# Patient Record
Sex: FEMALE | Race: WHITE | HISPANIC OR LATINO | Employment: FULL TIME | URBAN - METROPOLITAN AREA
[De-identification: names, ages, dates, MRNs, and addresses within clinical notes are randomized per-mention and may not be internally consistent; named-entity substitution may affect disease eponyms.]

---

## 2017-01-13 ENCOUNTER — TELEPHONE (OUTPATIENT)
Dept: DERMATOLOGY | Facility: CLINIC | Age: 52
End: 2017-01-13

## 2017-01-13 NOTE — TELEPHONE ENCOUNTER
----- Message from Terra Igor sent at 1/12/2017 12:05 PM CST -----  Contact: patient  Patient calling in regards to medication. She has 2 refills left for Oracea, but it costs over $600. She received a card from the Doctor last time with a percentage off. She wants to know if the office has anymore cards. Please advise.  Call back .  Thanks!

## 2017-03-07 DIAGNOSIS — L71.9 ROSACEA: ICD-10-CM

## 2017-03-07 RX ORDER — DOXYCYCLINE 40 MG/1
CAPSULE ORAL
Qty: 30 CAPSULE | Refills: 1 | Status: SHIPPED | OUTPATIENT
Start: 2017-03-07 | End: 2017-03-09 | Stop reason: SDUPTHER

## 2017-03-09 DIAGNOSIS — L71.9 ROSACEA: ICD-10-CM

## 2017-03-09 RX ORDER — DOXYCYCLINE 40 MG/1
CAPSULE ORAL
Qty: 30 CAPSULE | Refills: 5 | Status: SHIPPED | OUTPATIENT
Start: 2017-03-09 | End: 2017-08-26 | Stop reason: SDUPTHER

## 2017-08-26 DIAGNOSIS — L71.9 ROSACEA: ICD-10-CM

## 2017-08-28 RX ORDER — DOXYCYCLINE 40 MG/1
CAPSULE ORAL
Qty: 30 CAPSULE | Refills: 0 | Status: SHIPPED | OUTPATIENT
Start: 2017-08-28 | End: 2017-09-24 | Stop reason: SDUPTHER

## 2017-09-24 DIAGNOSIS — L71.9 ROSACEA: ICD-10-CM

## 2017-09-25 RX ORDER — DOXYCYCLINE 40 MG/1
CAPSULE ORAL
Qty: 30 CAPSULE | Refills: 0 | Status: SHIPPED | OUTPATIENT
Start: 2017-09-25 | End: 2017-11-06 | Stop reason: SDUPTHER

## 2017-11-06 DIAGNOSIS — L71.9 ROSACEA: ICD-10-CM

## 2017-11-07 RX ORDER — DOXYCYCLINE 40 MG/1
CAPSULE ORAL
Qty: 30 CAPSULE | Refills: 5 | Status: SHIPPED | OUTPATIENT
Start: 2017-11-07 | End: 2018-04-20 | Stop reason: SDUPTHER

## 2017-12-11 ENCOUNTER — ANESTHESIA EVENT (OUTPATIENT)
Dept: SURGERY | Facility: HOSPITAL | Age: 52
End: 2017-12-11
Payer: COMMERCIAL

## 2017-12-11 ENCOUNTER — HOSPITAL ENCOUNTER (OUTPATIENT)
Dept: RADIOLOGY | Facility: HOSPITAL | Age: 52
Discharge: HOME | End: 2017-12-11
Attending: ORTHOPAEDIC SURGERY
Payer: COMMERCIAL

## 2017-12-11 ENCOUNTER — HOSPITAL ENCOUNTER (OUTPATIENT)
Dept: PREADMISSION TESTING | Facility: HOSPITAL | Age: 52
Discharge: HOME | End: 2017-12-11
Attending: ORTHOPAEDIC SURGERY
Payer: COMMERCIAL

## 2017-12-11 VITALS — HEIGHT: 62 IN | WEIGHT: 160 LBS | BODY MASS INDEX: 29.44 KG/M2

## 2017-12-11 DIAGNOSIS — M23.41 LOOSE BODY IN KNEE, RIGHT KNEE: Primary | ICD-10-CM

## 2017-12-11 DIAGNOSIS — Z01.818 PRE-OP EXAM: ICD-10-CM

## 2017-12-11 LAB
ANION GAP SERPL CALC-SCNC: 10 MMOL/L
BASOPHILS # BLD AUTO: 0 K/UL
BASOPHILS NFR BLD: 0.2 %
BUN SERPL-MCNC: 10 MG/DL
CALCIUM SERPL-MCNC: 9.4 MG/DL
CHLORIDE SERPL-SCNC: 105 MMOL/L
CO2 SERPL-SCNC: 26 MMOL/L
CREAT SERPL-MCNC: 0.7 MG/DL
DIFFERENTIAL METHOD: ABNORMAL
EOSINOPHIL # BLD AUTO: 0.2 K/UL
EOSINOPHIL NFR BLD: 2.1 %
ERYTHROCYTE [DISTWIDTH] IN BLOOD BY AUTOMATED COUNT: 12.4 %
EST. GFR  (AFRICAN AMERICAN): >60 ML/MIN/1.73 M^2
EST. GFR  (NON AFRICAN AMERICAN): >60 ML/MIN/1.73 M^2
GLUCOSE SERPL-MCNC: 91 MG/DL
HCT VFR BLD AUTO: 36.8 %
HGB BLD-MCNC: 12.6 G/DL
LYMPHOCYTES # BLD AUTO: 2.7 K/UL
LYMPHOCYTES NFR BLD: 33.4 %
MCH RBC QN AUTO: 29.5 PG
MCHC RBC AUTO-ENTMCNC: 34.2 G/DL
MCV RBC AUTO: 86 FL
MONOCYTES # BLD AUTO: 0.7 K/UL
MONOCYTES NFR BLD: 8.6 %
NEUTROPHILS # BLD AUTO: 4.5 K/UL
NEUTROPHILS NFR BLD: 55.7 %
PLATELET # BLD AUTO: 320 K/UL
PMV BLD AUTO: 8.5 FL
POTASSIUM SERPL-SCNC: 3.8 MMOL/L
RBC # BLD AUTO: 4.26 M/UL
SODIUM SERPL-SCNC: 141 MMOL/L
WBC # BLD AUTO: 8.1 K/UL

## 2017-12-11 PROCEDURE — 93005 ELECTROCARDIOGRAM TRACING: CPT

## 2017-12-11 PROCEDURE — 99900104 DSU ONLY-NO CHARGE-EA ADD'L HR (STAT)

## 2017-12-11 PROCEDURE — 85025 COMPLETE CBC W/AUTO DIFF WBC: CPT

## 2017-12-11 PROCEDURE — 36415 COLL VENOUS BLD VENIPUNCTURE: CPT

## 2017-12-11 PROCEDURE — 99900103 DSU ONLY-NO CHARGE-INITIAL HR (STAT)

## 2017-12-11 PROCEDURE — 93010 ELECTROCARDIOGRAM REPORT: CPT | Mod: ,,, | Performed by: INTERNAL MEDICINE

## 2017-12-11 PROCEDURE — 71020 XR CHEST PA AND LATERAL: CPT | Mod: TC

## 2017-12-11 PROCEDURE — 80048 BASIC METABOLIC PNL TOTAL CA: CPT

## 2017-12-11 PROCEDURE — 71020 XR CHEST PA AND LATERAL: CPT | Mod: 26,,, | Performed by: RADIOLOGY

## 2017-12-11 RX ORDER — CEFAZOLIN SODIUM 2 G/50ML
2 SOLUTION INTRAVENOUS ONCE
Status: CANCELLED | OUTPATIENT
Start: 2017-12-11 | End: 2017-12-11

## 2017-12-12 ENCOUNTER — HOSPITAL ENCOUNTER (OUTPATIENT)
Facility: HOSPITAL | Age: 52
Discharge: HOME | End: 2017-12-12
Attending: ORTHOPAEDIC SURGERY | Admitting: ORTHOPAEDIC SURGERY
Payer: COMMERCIAL

## 2017-12-12 ENCOUNTER — ANESTHESIA (OUTPATIENT)
Dept: SURGERY | Facility: HOSPITAL | Age: 52
End: 2017-12-12
Payer: COMMERCIAL

## 2017-12-12 DIAGNOSIS — S83.232A COMPLEX TEAR OF MEDIAL MENISCUS OF LEFT KNEE: ICD-10-CM

## 2017-12-12 DIAGNOSIS — M23.41 BODIES, LOOSE, KNEE, RIGHT: ICD-10-CM

## 2017-12-12 PROCEDURE — 25000003 PHARM REV CODE 250: Performed by: ANESTHESIOLOGY

## 2017-12-12 PROCEDURE — 63600175 PHARM REV CODE 636 W HCPCS: Performed by: ANESTHESIOLOGY

## 2017-12-12 PROCEDURE — 99900103 DSU ONLY-NO CHARGE-INITIAL HR (STAT): Performed by: ORTHOPAEDIC SURGERY

## 2017-12-12 PROCEDURE — D9220A PRA ANESTHESIA: Mod: CRNA,,, | Performed by: NURSE ANESTHETIST, CERTIFIED REGISTERED

## 2017-12-12 PROCEDURE — 36000710: Performed by: ORTHOPAEDIC SURGERY

## 2017-12-12 PROCEDURE — 71000039 HC RECOVERY, EACH ADD'L HOUR: Performed by: ORTHOPAEDIC SURGERY

## 2017-12-12 PROCEDURE — 37000008 HC ANESTHESIA 1ST 15 MINUTES: Performed by: ORTHOPAEDIC SURGERY

## 2017-12-12 PROCEDURE — 71000033 HC RECOVERY, INTIAL HOUR: Performed by: ORTHOPAEDIC SURGERY

## 2017-12-12 PROCEDURE — 27200651 HC AIRWAY, LMA: Performed by: NURSE ANESTHETIST, CERTIFIED REGISTERED

## 2017-12-12 PROCEDURE — 99900104 DSU ONLY-NO CHARGE-EA ADD'L HR (STAT): Performed by: ORTHOPAEDIC SURGERY

## 2017-12-12 PROCEDURE — 36000711: Performed by: ORTHOPAEDIC SURGERY

## 2017-12-12 PROCEDURE — 37000009 HC ANESTHESIA EA ADD 15 MINS: Performed by: ORTHOPAEDIC SURGERY

## 2017-12-12 PROCEDURE — 71000015 HC POSTOP RECOV 1ST HR: Performed by: ORTHOPAEDIC SURGERY

## 2017-12-12 PROCEDURE — 63600175 PHARM REV CODE 636 W HCPCS: Performed by: NURSE ANESTHETIST, CERTIFIED REGISTERED

## 2017-12-12 PROCEDURE — D9220A PRA ANESTHESIA: Mod: ANES,,, | Performed by: ANESTHESIOLOGY

## 2017-12-12 PROCEDURE — 25000003 PHARM REV CODE 250: Performed by: ORTHOPAEDIC SURGERY

## 2017-12-12 PROCEDURE — S0077 INJECTION, CLINDAMYCIN PHOSP: HCPCS | Performed by: ORTHOPAEDIC SURGERY

## 2017-12-12 RX ORDER — ONDANSETRON 4 MG/1
8 TABLET, ORALLY DISINTEGRATING ORAL EVERY 8 HOURS PRN
Status: CANCELLED | OUTPATIENT
Start: 2017-12-12

## 2017-12-12 RX ORDER — SODIUM CHLORIDE 0.9 % (FLUSH) 0.9 %
5 SYRINGE (ML) INJECTION
Status: DISCONTINUED | OUTPATIENT
Start: 2017-12-12 | End: 2017-12-12 | Stop reason: HOSPADM

## 2017-12-12 RX ORDER — HYDROMORPHONE HYDROCHLORIDE 1 MG/ML
0.5 INJECTION, SOLUTION INTRAMUSCULAR; INTRAVENOUS; SUBCUTANEOUS
Status: CANCELLED | OUTPATIENT
Start: 2017-12-12

## 2017-12-12 RX ORDER — ONDANSETRON 2 MG/ML
INJECTION INTRAMUSCULAR; INTRAVENOUS
Status: DISCONTINUED | OUTPATIENT
Start: 2017-12-12 | End: 2017-12-12

## 2017-12-12 RX ORDER — DEXAMETHASONE SODIUM PHOSPHATE 4 MG/ML
INJECTION, SOLUTION INTRA-ARTICULAR; INTRALESIONAL; INTRAMUSCULAR; INTRAVENOUS; SOFT TISSUE
Status: DISCONTINUED | OUTPATIENT
Start: 2017-12-12 | End: 2017-12-12

## 2017-12-12 RX ORDER — PROPOFOL 10 MG/ML
VIAL (ML) INTRAVENOUS
Status: DISCONTINUED | OUTPATIENT
Start: 2017-12-12 | End: 2017-12-12

## 2017-12-12 RX ORDER — ONDANSETRON 4 MG/1
8 TABLET, ORALLY DISINTEGRATING ORAL EVERY 6 HOURS PRN
Qty: 30 TABLET | Refills: 1 | Status: SHIPPED | OUTPATIENT
Start: 2017-12-12 | End: 2018-06-07

## 2017-12-12 RX ORDER — FENTANYL CITRATE 50 UG/ML
INJECTION, SOLUTION INTRAMUSCULAR; INTRAVENOUS
Status: DISCONTINUED | OUTPATIENT
Start: 2017-12-12 | End: 2017-12-12

## 2017-12-12 RX ORDER — LIDOCAINE HYDROCHLORIDE 10 MG/ML
1 INJECTION, SOLUTION EPIDURAL; INFILTRATION; INTRACAUDAL; PERINEURAL ONCE
Status: COMPLETED | OUTPATIENT
Start: 2017-12-12 | End: 2017-12-12

## 2017-12-12 RX ORDER — FENTANYL CITRATE 50 UG/ML
25 INJECTION, SOLUTION INTRAMUSCULAR; INTRAVENOUS EVERY 5 MIN PRN
Status: DISCONTINUED | OUTPATIENT
Start: 2017-12-12 | End: 2017-12-12 | Stop reason: HOSPADM

## 2017-12-12 RX ORDER — OXYCODONE HYDROCHLORIDE 5 MG/1
5 TABLET ORAL ONCE AS NEEDED
Status: COMPLETED | OUTPATIENT
Start: 2017-12-12 | End: 2017-12-12

## 2017-12-12 RX ORDER — ACETAMINOPHEN 10 MG/ML
INJECTION, SOLUTION INTRAVENOUS
Status: DISCONTINUED | OUTPATIENT
Start: 2017-12-12 | End: 2017-12-12

## 2017-12-12 RX ORDER — MIDAZOLAM HYDROCHLORIDE 1 MG/ML
INJECTION, SOLUTION INTRAMUSCULAR; INTRAVENOUS
Status: DISCONTINUED | OUTPATIENT
Start: 2017-12-12 | End: 2017-12-12

## 2017-12-12 RX ORDER — LIDOCAINE HCL/PF 100 MG/5ML
SYRINGE (ML) INTRAVENOUS
Status: DISCONTINUED | OUTPATIENT
Start: 2017-12-12 | End: 2017-12-12

## 2017-12-12 RX ORDER — MUPIROCIN 20 MG/G
1 OINTMENT TOPICAL 2 TIMES DAILY
Status: CANCELLED | OUTPATIENT
Start: 2017-12-12 | End: 2017-12-17

## 2017-12-12 RX ORDER — CETIRIZINE HYDROCHLORIDE 10 MG/1
10 TABLET ORAL DAILY
COMMUNITY

## 2017-12-12 RX ORDER — AMOXICILLIN 500 MG
2 CAPSULE ORAL DAILY
Status: ON HOLD | COMMUNITY
End: 2017-12-12 | Stop reason: CLARIF

## 2017-12-12 RX ORDER — KETOROLAC TROMETHAMINE 30 MG/ML
INJECTION, SOLUTION INTRAMUSCULAR; INTRAVENOUS
Status: DISCONTINUED | OUTPATIENT
Start: 2017-12-12 | End: 2017-12-12

## 2017-12-12 RX ORDER — ONDANSETRON 2 MG/ML
4 INJECTION INTRAMUSCULAR; INTRAVENOUS ONCE AS NEEDED
Status: DISCONTINUED | OUTPATIENT
Start: 2017-12-12 | End: 2017-12-12 | Stop reason: HOSPADM

## 2017-12-12 RX ORDER — SODIUM CHLORIDE, SODIUM LACTATE, POTASSIUM CHLORIDE, CALCIUM CHLORIDE 600; 310; 30; 20 MG/100ML; MG/100ML; MG/100ML; MG/100ML
INJECTION, SOLUTION INTRAVENOUS CONTINUOUS
Status: DISCONTINUED | OUTPATIENT
Start: 2017-12-12 | End: 2017-12-12

## 2017-12-12 RX ORDER — CLINDAMYCIN PHOSPHATE 900 MG/50ML
900 INJECTION, SOLUTION INTRAVENOUS ONCE
Status: COMPLETED | OUTPATIENT
Start: 2017-12-12 | End: 2017-12-12

## 2017-12-12 RX ADMIN — ACETAMINOPHEN 1000 MG: 10 INJECTION, SOLUTION INTRAVENOUS at 08:12

## 2017-12-12 RX ADMIN — CLINDAMYCIN PHOSPHATE 900 MG: 18 INJECTION, SOLUTION INTRAVENOUS at 08:12

## 2017-12-12 RX ADMIN — OXYCODONE HYDROCHLORIDE 5 MG: 5 TABLET ORAL at 09:12

## 2017-12-12 RX ADMIN — PROPOFOL 150 MG: 10 INJECTION, EMULSION INTRAVENOUS at 08:12

## 2017-12-12 RX ADMIN — FENTANYL CITRATE 25 MCG: 50 INJECTION INTRAMUSCULAR; INTRAVENOUS at 09:12

## 2017-12-12 RX ADMIN — FENTANYL CITRATE 25 MCG: 50 INJECTION, SOLUTION INTRAMUSCULAR; INTRAVENOUS at 08:12

## 2017-12-12 RX ADMIN — FENTANYL CITRATE 50 MCG: 50 INJECTION, SOLUTION INTRAMUSCULAR; INTRAVENOUS at 08:12

## 2017-12-12 RX ADMIN — ONDANSETRON 4 MG: 2 INJECTION, SOLUTION INTRAMUSCULAR; INTRAVENOUS at 08:12

## 2017-12-12 RX ADMIN — DEXAMETHASONE SODIUM PHOSPHATE 8 MG: 4 INJECTION, SOLUTION INTRAMUSCULAR; INTRAVENOUS at 08:12

## 2017-12-12 RX ADMIN — LIDOCAINE HYDROCHLORIDE 10 MG: 10 INJECTION, SOLUTION EPIDURAL; INFILTRATION; INTRACAUDAL; PERINEURAL at 06:12

## 2017-12-12 RX ADMIN — SODIUM CHLORIDE, SODIUM GLUCONATE, SODIUM ACETATE, POTASSIUM CHLORIDE, MAGNESIUM CHLORIDE, SODIUM PHOSPHATE, DIBASIC, AND POTASSIUM PHOSPHATE: .53; .5; .37; .037; .03; .012; .00082 INJECTION, SOLUTION INTRAVENOUS at 07:12

## 2017-12-12 RX ADMIN — LIDOCAINE HYDROCHLORIDE 75 MG: 20 INJECTION, SOLUTION INTRAVENOUS at 08:12

## 2017-12-12 RX ADMIN — MIDAZOLAM 2 MG: 1 INJECTION INTRAMUSCULAR; INTRAVENOUS at 07:12

## 2017-12-12 RX ADMIN — KETOROLAC TROMETHAMINE 30 MG: 30 INJECTION, SOLUTION INTRAMUSCULAR; INTRAVENOUS at 08:12

## 2017-12-12 NOTE — BRIEF OP NOTE
"[facility]  Brief Operative Note     SUMMARY     Surgery Date: 12/12/2017     Surgeon(s) and Role:     * Dontrell Rodriguez Jr., MD - Primary    Assisting Surgeon: None    Pre-op Diagnosis:  Loose body in knee, right [M23.41]    Post-op Diagnosis:  Post-Op Diagnosis Codes:     * Loose body in knee, right [M23.41]    Procedure(s) (LRB):  ARTHROSCOPY-KNEE (Right)    Anesthesia: General    Description of the findings of the procedure  cplasty mfc and patella    Estimated Blood Loss: 5 mL         Specimens:   Specimen (12h ago through future)    None          Discharge Note    SUMMARY     Admit Date: 12/12/2017    Discharge Date and Time:  12/12/2017 9:00 AM    Hospital Course (synopsis of major diagnoses, care, treatment, and services provided during the course of the hospital stay): Uneventful Outpatient Surgery     Final Diagnosis: Post-Op Diagnosis Codes:     * Loose body in knee, right [M23.41]    Disposition: Home or Self Care    Follow Up/Patient Instructions:     Medications:  Reconciled Home Medications: Current Discharge Medication List      START taking these medications    Details   ondansetron (ZOFRAN-ODT) 4 MG TbDL Take 2 tablets (8 mg total) by mouth every 6 (six) hours as needed.  Qty: 30 tablet, Refills: 1         CONTINUE these medications which have NOT CHANGED    Details   amlodipine-benazepril 5-20 mg (LOTREL) 5-20 mg per capsule       cetirizine (ZYRTEC) 10 MG tablet Take 10 mg by mouth once daily.      cyclobenzaprine (FLEXERIL) 10 MG tablet       doxycycline (ORACEA) 40 mg capsule TAKE 1 CAPSULE BY MOUTH ONCE DAILY  Qty: 30 capsule, Refills: 5    Associated Diagnoses: Rosacea      metronidazole 1% (METROGEL) 1 % Gel       PREMARIN 0.625 mg tablet       simvastatin (ZOCOR) 20 MG tablet       zolpidem (AMBIEN) 10 mg Tab              Discharge Procedure Orders  WALKER FOR HOME USE   Order Specific Question Answer Comments   Type of Walker: Pedro Pablo (4'4"-5'7")    With wheels? No " "standard   Height: 5' 1.5" (1.562 m)    Weight: 72.6 kg (160 lb)    Length of need (1-99 months): 99    Please check all that apply: Patient's condition impairs ambulation.    Please check all that apply: Patient is unable to safely ambulate without equipment.    Please check all that apply: Patient needs help to get in and out of chair.      WALKER FOR HOME USE   Order Specific Question Answer Comments   Type of Walker: Adult (5'4"-6'6")    With wheels? Yes    Height: 5' 1.5" (1.562 m)    Weight: 72.6 kg (160 lb)    Length of need (1-99 months): 2    Please check all that apply: Patient's condition impairs ambulation.      Diet general     Activity as tolerated     Shower on day dressing removed (No bath)     Ice to affected area     Weight bearing restrictions (specify)     No driving, operating heavy equipment or signing legal documents while taking pain medication     Call MD for:  temperature >100.4     Call MD for:  persistent nausea and vomiting     Call MD for:  severe uncontrolled pain     Call MD for:  difficulty breathing, headache or visual disturbances     Call MD for:  redness, tenderness, or signs of infection (pain, swelling, redness, odor or green/yellow discharge around incision site)     Call MD for:  hives     Call MD for:  persistent dizziness or light-headedness     Call MD for:  extreme fatigue     Remove dressing in 72 hours       Follow-up Information     Dontrell Rodriguez Jr, MD In 10 days.    Specialty:  Orthopedic Surgery  Contact information:  [address] [address]  [phone]                 "

## 2017-12-12 NOTE — ANESTHESIA POSTPROCEDURE EVALUATION
"Anesthesia Post Evaluation    Patient: Karlene Damon    Procedure(s) Performed: Procedure(s) (LRB):  ARTHROSCOPY-KNEE (Right)    Final Anesthesia Type: general  Patient location during evaluation: PACU  Patient participation: Yes- Able to Participate  Level of consciousness: awake and alert  Post-procedure vital signs: reviewed and stable  Pain management: adequate  Airway patency: patent  PONV status at discharge: No PONV  Anesthetic complications: no      Cardiovascular status: blood pressure returned to baseline and hemodynamically stable  Respiratory status: unassisted  Hydration status: euvolemic  Follow-up not needed.        Visit Vitals  BP (!) 111/56   Pulse 94   Temp 36.5 °C (97.7 °F) (Temporal)   Resp 13   Ht 5' 1.5" (1.562 m)   Wt 72.6 kg (160 lb)   SpO2 99%   Breastfeeding? No   BMI 29.74 kg/m²       Pain/Shayna Score: Pain Assessment Performed: Yes (12/12/2017  9:30 AM)  Presence of Pain: complains of pain/discomfort (12/12/2017  9:30 AM)  Pain Rating Prior to Med Admin: 7 (12/12/2017  9:35 AM)  Shayna Score: 8 (12/12/2017  9:30 AM)      "

## 2017-12-12 NOTE — PLAN OF CARE
Pt states that pain is tolerable and that she is ready for discharge.  Walker delivered and use explained by rep

## 2017-12-12 NOTE — OP NOTE
DATE OF PROCEDURE:  12/12/2017    PROCEDURE:  Arthroscopy of right knee with chondroplasty of medial femoral   condyle and patella.    PREOPERATIVE DIAGNOSES:  Chondral defect of patella with loose body.    POSTOPERATIVE DIAGNOSES:  Chondral defect of medial femoral condyle and patella,   no loose body.    SURGEON:  Dontrell Rodriguez M.D.    ASSISTANT:  Stephen Lilly.    ANESTHESIA:  General.    PROCEDURE IN DETAIL:  The patient was brought to the Operating Room and placed   under general anesthesia.  The right knee was then prepped and draped in a   satisfactory manner, exsanguinated the Esmarch, elevated the tourniquet to 300.    Introduced the Veress needle through supralateral puncture, then made an   anteromedial and anterolateral portal.  We then looked at the anterior   compartment, cleaned out some of the fat pad to get a good look.  We could then   see the ACL was in good condition, the PCL was in good condition and no obvious   loose bodies in the anterior compartment.  On the medial side of the knee, the   medial meniscus was in good condition, the medial tibial plateau was in good   condition.  Medial femoral condyle had 2 chondral defects, both of which were   just anterior to the weightbearing surface, not in the weightbearing surface   itself, weightbearing surface really looked good.  No real cobblestoning or   wear.  The defects anterior to that were then debrided with the shaver.  One of   those defects was about 1 cm in size and was quite a bit loose material.  Once   that was cleaned up, we were down to bone and later on we elected to do a little   chondroplasty with a chondral pick, put a couple of holes in the subchondral   bone and we could see some pink blood supply underneath that, tried to get that   to fill in, but this was not a big area, but less than 1 cm down to bone, but   all the way down to bone, well delineated, well demarcated defect.  The lateral   side of the knee was in good  condition.  The surfaces were nice and smooth.  The   lateral meniscus in good condition, no loose body.  Then, looked at the   patellofemoral joint from above and below, better seen from above.  She had   significant chondromalacia of patella, superior half of the patella required   chondroplasty.  As we went more inferior, we could see she required   chondroplasty of that as well and there was a small area down to bone at the   inferior pole of the patella, but the patella with the shaver and a thermal   device smoothed out significantly and did seem to ride adequately down the   groove.  I do not think she needed any realignment maneuvers.  We then looked   around the knee suprapatellar, swept the gutters twice, did not see any loose   bodies and we cleaned up some of the synovium to see if there are any loose   bodies trapped in the suprapatellar area, but I could not find a loose body   anywhere in the suprapatellar area or along the lateral gutter or the medial   gutter.  Went back anteriorly and looked again anterior compartment and the   medial and lateral corners and could not find loose body.  So, if there is a   loose body, it is well contained and out of the way and not loose.  We did find   good reason for catching in the knee with these 2 chondral defects of the   patella and the medial femoral condyle.  We then withdrew our instruments, used   some Marcaine with epinephrine to the skin and closed the skin with nylon,   applied a bulky dressing.  She was then reversed from anesthesia, taken to   Recovery Room in satisfactory condition, tolerated the procedure well.      ELMER/IN  dd: 12/12/2017 09:05:41 (CST)  td: 12/12/2017 09:52:06 ([facility])  Doc ID   #[id number]  Job ID #[id number]    CC:

## 2017-12-12 NOTE — PROGRESS NOTES
Walker delivered to bedside. All paperwork signed by  and returned to supply closet. Okay for walker delivery by Arya with [facility].

## 2017-12-12 NOTE — ANESTHESIA PREPROCEDURE EVALUATION
12/12/2017  Karlene Damon is a 52 y.o., female.    Anesthesia Evaluation    I have reviewed the Patient Summary Reports.    I have reviewed the Nursing Notes.      Review of Systems  Anesthesia Hx:  No problems with previous Anesthesia    Cardiovascular:   Hypertension, well controlled        Physical Exam  General:  Well nourished                 Anesthesia Plan  Type of Anesthesia, risks & benefits discussed:  Anesthesia Type:  general  Patient's Preference:   Intra-op Monitoring Plan:   Intra-op Monitoring Plan Comments:   Post Op Pain Control Plan:   Post Op Pain Control Plan Comments:   Induction:   IV  Beta Blocker:  Patient is not currently on a Beta-Blocker (No further documentation required).       Informed Consent: Patient understands risks and agrees with Anesthesia plan.  Questions answered. Anesthesia consent signed with patient.  ASA Score: 2     Day of Surgery Review of History & Physical:    H&P update referred to the surgeon.         Ready For Surgery From Anesthesia Perspective.

## 2017-12-12 NOTE — DISCHARGE INSTRUCTIONS
KEep ice to knee for two days.  Remove dressing in two days and shower, no tub baths or hot tub.    Keep knee elevated         General Information:    1.  Do not drink alcoholic beverages including beer for 24 hours or as long as you are on pain medication..  2.  Do not drive a motor vehicle, operate machinery or power tools, or signs legal papers for 24 hours or as long as you are on pain medication.   3.  You may experience light-headedness, dizziness, and sleepiness following surgery. Please do not stay alone. A responsible adult should be with you for this 24 hour period.  4.  Go home and rest.    5. Progress slowly to a normal diet unless instructed.  Otherwise, begin with liquids such as soft drinks, then soup and crackers working up to solid foods. Drink plenty of nonalcoholic fluids.  6.  Certain anesthetics and pain medications produce nausea and vomiting in certain       individuals. If nausea becomes a problem at home, call you doctor.    7. A nurse will be calling you sometime after surgery. Do not be alarmed. This is our way of finding out how you are doing.    8. Several times every hour while you are awake, take 2-3 deep breaths and cough. If you had stomach surgery hold a pillow or rolled towel firmly against your stomach before you cough. This will help with any pain the cough might cause.  9. Several times every hour while you are awake, pump and flex your feet 5-6 times and do foot circles. This will help prevent blood clots.    10.Call your doctor for severe pain, bleeding, fever, or signs or symptoms of infection (pain, swelling, redness, foul odor, drainage).      General Information:    1.  Do not drink alcoholic beverages including beer for 24 hours or as long as you are on pain medication..  2.  Do not drive a motor vehicle, operate machinery or power tools, or signs legal papers for 24 hours or as long as you are on pain medication.   3.  You may experience light-headedness, dizziness, and  sleepiness following surgery. Please do not stay alone. A responsible adult should be with you for this 24 hour period.  4.  Go home and rest.    5. Progress slowly to a normal diet unless instructed.  Otherwise, begin with liquids such as soft drinks, then soup and crackers working up to solid foods. Drink plenty of nonalcoholic fluids.  6.  Certain anesthetics and pain medications produce nausea and vomiting in certain       individuals. If nausea becomes a problem at home, call you doctor.    7. A nurse will be calling you sometime after surgery. Do not be alarmed. This is our way of finding out how you are doing.    8. Several times every hour while you are awake, take 2-3 deep breaths and cough. If you had stomach surgery hold a pillow or rolled towel firmly against your stomach before you cough. This will help with any pain the cough might cause.  9. Several times every hour while you are awake, pump and flex your feet 5-6 times and do foot circles. This will help prevent blood clots.    10.Call your doctor for severe pain, bleeding, fever, or signs or symptoms of infection (pain, swelling, redness, foul odor, drainage).      Using Opioids for Pain Management     Your doctor has given instructions for you to take an opioid.  This is a drug for bad pain.  It helps control pain without causing bleeding and kidney problems.  Common opioid names are morphine, hydromorphone, oxycodone, and methadone. These drugs are called narcotics.    There are several safety concerns you need to know.     · It is against the law to give or sell this drug to another person.  You must keep this medicine safely locked.    · You may have side effects from taking this medication.  These include nausea, itching, sweating, sleepiness, a change in your ability to breathe, and depression.  · Do not take alcohol or sleeping pills opioids.    · Long-term opoid use may no longer giver you relief from pain.  It can cause you stomach pain,  mental anxiety, and headaches.  Long-term opoid use can potentially lead to unlawful street drug abuse and reduce your ability to stay employed.    · Your body may become opioid tolerant if you need to take more to get relief.    · You must stop taking opioids if you begin having more pain as a result of the medicine.    · Opioid withdrawal occurs when you have to stop taking the drug.  It can cause you to have nausea, vomiting, diarrhea, stomach pain, anxiety, and dilated pupils in your eyes. This condition means you are opioid dependent.    · Addiction is a drug induced brain disease. It means there are changes in how your brain is working.  Children, teens, and young adults under 25 years old are more likely to get addicted to opioids.      · Addiction can happen with repeated opioid use.  It does not happen with short-term use of two weeks or less.       For more information, please speak with your doctor or pharmacist.      We hope your stay was comfortable as you heal now, mend and rest.    For we have enjoyed taking care of you by giving your our best.    And as you get better, by regaining your health and strength;   We count it as a privilege to have served you and hope your time at [facility] was well spent.      Thank  You!!!

## 2017-12-12 NOTE — PLAN OF CARE
Pt calm,  at her side, discussed and explained Text message,  Both verbalized understanding.

## 2017-12-13 VITALS
HEIGHT: 62 IN | BODY MASS INDEX: 29.44 KG/M2 | SYSTOLIC BLOOD PRESSURE: 110 MMHG | TEMPERATURE: 98 F | HEART RATE: 80 BPM | WEIGHT: 160 LBS | DIASTOLIC BLOOD PRESSURE: 70 MMHG | OXYGEN SATURATION: 97 % | RESPIRATION RATE: 18 BRPM

## 2018-04-20 DIAGNOSIS — L71.9 ROSACEA: ICD-10-CM

## 2018-04-20 RX ORDER — DOXYCYCLINE 40 MG/1
CAPSULE ORAL
Qty: 30 CAPSULE | Refills: 5 | Status: SHIPPED | OUTPATIENT
Start: 2018-04-20 | End: 2018-10-12 | Stop reason: SDUPTHER

## 2018-05-30 ENCOUNTER — DOCUMENTATION ONLY (OUTPATIENT)
Dept: FAMILY MEDICINE | Facility: CLINIC | Age: 53
End: 2018-05-30

## 2018-05-30 NOTE — PROGRESS NOTES
Pre-Visit Chart Review  For Appointment Scheduled on 6/7/18.    Health Maintenance Due   Topic Date Due    Hepatitis C Screening  1965    Lipid Panel  1965    TETANUS VACCINE  05/11/1982    Mammogram  05/11/2004    Colonoscopy  05/11/2014

## 2018-06-07 ENCOUNTER — LAB VISIT (OUTPATIENT)
Dept: LAB | Facility: HOSPITAL | Age: 53
End: 2018-06-07
Attending: FAMILY MEDICINE
Payer: COMMERCIAL

## 2018-06-07 ENCOUNTER — PATIENT MESSAGE (OUTPATIENT)
Dept: FAMILY MEDICINE | Facility: CLINIC | Age: 53
End: 2018-06-07

## 2018-06-07 ENCOUNTER — OFFICE VISIT (OUTPATIENT)
Dept: FAMILY MEDICINE | Facility: CLINIC | Age: 53
End: 2018-06-07
Payer: COMMERCIAL

## 2018-06-07 VITALS
WEIGHT: 167.56 LBS | BODY MASS INDEX: 30.83 KG/M2 | SYSTOLIC BLOOD PRESSURE: 118 MMHG | HEIGHT: 62 IN | TEMPERATURE: 99 F | DIASTOLIC BLOOD PRESSURE: 86 MMHG | HEART RATE: 103 BPM

## 2018-06-07 DIAGNOSIS — Z00.00 ROUTINE MEDICAL EXAM: Primary | ICD-10-CM

## 2018-06-07 DIAGNOSIS — I10 HYPERTENSION, UNSPECIFIED TYPE: ICD-10-CM

## 2018-06-07 DIAGNOSIS — E66.9 OBESITY, CLASS I, BMI 30-34.9: ICD-10-CM

## 2018-06-07 DIAGNOSIS — Z11.59 NEED FOR HEPATITIS C SCREENING TEST: ICD-10-CM

## 2018-06-07 DIAGNOSIS — R10.9 RIGHT FLANK PAIN: ICD-10-CM

## 2018-06-07 DIAGNOSIS — Z00.00 ROUTINE MEDICAL EXAM: ICD-10-CM

## 2018-06-07 DIAGNOSIS — Z12.11 SCREENING FOR COLON CANCER: ICD-10-CM

## 2018-06-07 PROBLEM — G47.00 INSOMNIA: Status: ACTIVE | Noted: 2018-06-07

## 2018-06-07 PROBLEM — M62.830 BACK SPASM: Status: ACTIVE | Noted: 2018-06-07

## 2018-06-07 PROBLEM — L71.9 ROSACEA: Status: ACTIVE | Noted: 2018-06-07

## 2018-06-07 PROBLEM — E66.811 OBESITY, CLASS I, BMI 30-34.9: Status: ACTIVE | Noted: 2018-06-07

## 2018-06-07 PROBLEM — E78.2 HYPERLIPIDEMIA, MIXED: Status: ACTIVE | Noted: 2018-06-07

## 2018-06-07 PROBLEM — Z79.890 HORMONE REPLACEMENT THERAPY (HRT): Status: ACTIVE | Noted: 2018-06-07

## 2018-06-07 LAB
ALBUMIN SERPL BCP-MCNC: 3.8 G/DL
ALP SERPL-CCNC: 65 U/L
ALT SERPL W/O P-5'-P-CCNC: 20 U/L
ANION GAP SERPL CALC-SCNC: 9 MMOL/L
AST SERPL-CCNC: 19 U/L
BASOPHILS # BLD AUTO: 0.05 K/UL
BASOPHILS NFR BLD: 0.7 %
BILIRUB SERPL-MCNC: 0.3 MG/DL
BUN SERPL-MCNC: 9 MG/DL
CALCIUM SERPL-MCNC: 9.7 MG/DL
CHLORIDE SERPL-SCNC: 106 MMOL/L
CHOLEST SERPL-MCNC: 148 MG/DL
CHOLEST/HDLC SERPL: 3.1 {RATIO}
CO2 SERPL-SCNC: 24 MMOL/L
CREAT SERPL-MCNC: 0.8 MG/DL
DIFFERENTIAL METHOD: ABNORMAL
EOSINOPHIL # BLD AUTO: 0.1 K/UL
EOSINOPHIL NFR BLD: 1.9 %
ERYTHROCYTE [DISTWIDTH] IN BLOOD BY AUTOMATED COUNT: 12.7 %
EST. GFR  (AFRICAN AMERICAN): >60 ML/MIN/1.73 M^2
EST. GFR  (NON AFRICAN AMERICAN): >60 ML/MIN/1.73 M^2
GLUCOSE SERPL-MCNC: 99 MG/DL
HCT VFR BLD AUTO: 39.2 %
HDLC SERPL-MCNC: 47 MG/DL
HDLC SERPL: 31.8 %
HGB BLD-MCNC: 12.6 G/DL
IMM GRANULOCYTES # BLD AUTO: 0.05 K/UL
IMM GRANULOCYTES NFR BLD AUTO: 0.7 %
LDLC SERPL CALC-MCNC: 65.4 MG/DL
LYMPHOCYTES # BLD AUTO: 2.3 K/UL
LYMPHOCYTES NFR BLD: 31.2 %
MCH RBC QN AUTO: 29.4 PG
MCHC RBC AUTO-ENTMCNC: 32.1 G/DL
MCV RBC AUTO: 91 FL
MONOCYTES # BLD AUTO: 0.7 K/UL
MONOCYTES NFR BLD: 9.4 %
NEUTROPHILS # BLD AUTO: 4.1 K/UL
NEUTROPHILS NFR BLD: 56.1 %
NONHDLC SERPL-MCNC: 101 MG/DL
NRBC BLD-RTO: 0 /100 WBC
PLATELET # BLD AUTO: 348 K/UL
PMV BLD AUTO: 10.4 FL
POTASSIUM SERPL-SCNC: 4.5 MMOL/L
PROT SERPL-MCNC: 7.3 G/DL
RBC # BLD AUTO: 4.29 M/UL
SODIUM SERPL-SCNC: 139 MMOL/L
TRIGL SERPL-MCNC: 178 MG/DL
TSH SERPL DL<=0.005 MIU/L-ACNC: 1.57 UIU/ML
WBC # BLD AUTO: 7.37 K/UL

## 2018-06-07 PROCEDURE — 99386 PREV VISIT NEW AGE 40-64: CPT | Mod: 25,S$GLB,, | Performed by: FAMILY MEDICINE

## 2018-06-07 PROCEDURE — 99999 PR PBB SHADOW E&M-EST. PATIENT-LVL IV: CPT | Mod: PBBFAC,,, | Performed by: FAMILY MEDICINE

## 2018-06-07 PROCEDURE — 86803 HEPATITIS C AB TEST: CPT

## 2018-06-07 PROCEDURE — 85025 COMPLETE CBC W/AUTO DIFF WBC: CPT

## 2018-06-07 PROCEDURE — 80061 LIPID PANEL: CPT

## 2018-06-07 PROCEDURE — 80053 COMPREHEN METABOLIC PANEL: CPT

## 2018-06-07 PROCEDURE — 87086 URINE CULTURE/COLONY COUNT: CPT

## 2018-06-07 PROCEDURE — 84443 ASSAY THYROID STIM HORMONE: CPT

## 2018-06-07 PROCEDURE — 3074F SYST BP LT 130 MM HG: CPT | Mod: CPTII,S$GLB,, | Performed by: FAMILY MEDICINE

## 2018-06-07 PROCEDURE — 36415 COLL VENOUS BLD VENIPUNCTURE: CPT | Mod: PO

## 2018-06-07 PROCEDURE — 3079F DIAST BP 80-89 MM HG: CPT | Mod: CPTII,S$GLB,, | Performed by: FAMILY MEDICINE

## 2018-06-07 RX ORDER — GLUCOSAM/CHONDRO/HERB 149/HYAL 750-100 MG
1 TABLET ORAL DAILY
COMMUNITY

## 2018-06-07 RX ORDER — SIMVASTATIN 10 MG/1
10 TABLET, FILM COATED ORAL DAILY
Refills: 3 | COMMUNITY
Start: 2018-04-20 | End: 2019-01-17 | Stop reason: SDUPTHER

## 2018-06-07 RX ORDER — FLUTICASONE PROPIONATE 50 MCG
1 SPRAY, SUSPENSION (ML) NASAL
COMMUNITY

## 2018-06-07 RX ORDER — TRIAMTERENE AND HYDROCHLOROTHIAZIDE 37.5; 25 MG/1; MG/1
1 CAPSULE ORAL DAILY PRN
COMMUNITY
End: 2018-12-26 | Stop reason: SDUPTHER

## 2018-06-07 RX ORDER — ZOLPIDEM TARTRATE 10 MG/1
10 TABLET ORAL NIGHTLY
Qty: 30 TABLET | Refills: 5 | Status: SHIPPED | OUTPATIENT
Start: 2018-06-07 | End: 2018-12-11 | Stop reason: SDUPTHER

## 2018-06-07 RX ORDER — CYCLOBENZAPRINE HCL 10 MG
10 TABLET ORAL NIGHTLY
Qty: 30 TABLET | Refills: 6 | Status: SHIPPED | OUTPATIENT
Start: 2018-06-07 | End: 2018-12-08 | Stop reason: SDUPTHER

## 2018-06-07 RX ORDER — SULFAMETHOXAZOLE AND TRIMETHOPRIM 800; 160 MG/1; MG/1
1 TABLET ORAL EVERY 12 HOURS
Qty: 6 TABLET | Refills: 0 | Status: SHIPPED | OUTPATIENT
Start: 2018-06-07 | End: 2018-06-10

## 2018-06-07 NOTE — TELEPHONE ENCOUNTER
Please let patient know that the pharmacy is filling her old prescription. Can they cancel these and fill my prescription as I am her new PCP?

## 2018-06-07 NOTE — PROGRESS NOTES
CHIEF COMPLAINT:  Routine medical exam      HISTORY OF PRESENT ILLNESS:  Karlene Damon is a 53 y.o. female who presents to clinic as a new patient to establish care and for a routine medical exam. She follows up with Dr. Amin, OB-GYN and had a last mammogram in March 2017 which was normal. She has never had a colonoscopy. She follows up with her dentist, optometrist regularly. She tries to eat a healthy diet, she is active at work but does not get any regular exercise. She has noticed some right flank pain for the lat 4 days, she is unsure if she pulled a muscle, she does have a history of nephrolithiasis. It is somewhat relieved with aleve and flexeril.       REVIEW OF SYSTEMS:  The patient denies any fever, chills, night sweats, headaches, vision changes, difficulty speaking or swallowing, decreased hearing, weight loss, weight gain, chest pain, palpitations, shortness of breath, cough, nausea, vomiting, abdominal pain, dysuria, diarrhea, constipation, hematuria, hematochezia, melena, changes in her hair, skin, nails, numbness or weakness in her extremities, erythema,swelling over any of her joints, myalgia, swollen glands, easy bruising, fatigue, edema, symptoms of anxiety or depression. She denies any vaginal discharge, breast masses, nipple discharge, change in the skin overlying her breasts.      MEDICATIONS:   Reviewed and/or reconciled in EPIC    ALLERGIES:  Reviewed and/or reconciled in [facility]    PAST MEDICAL/SURGICAL HISTORY:   Past Medical History:   Diagnosis Date    Back spasm 6/7/2018    High cholesterol     Hormone replacement therapy (HRT) 6/7/2018    HTN (hypertension) 6/7/2018    Hyperlipidemia, mixed 6/7/2018    Hypertension     Insomnia 6/7/2018    Rosacea 6/7/2018      Past Surgical History:   Procedure Laterality Date    APPENDECTOMY      GALLBLADDER SURGERY      HYSTERECTOMY      PLANTAR FASCIA SURGERY      Left foot    right knee arthroscopy      Rt shoulder      A-scope  "      FAMILY HISTORY:    Family History   Problem Relation Age of Onset    Melanoma Neg Hx     Psoriasis Neg Hx     Lupus Neg Hx     Eczema Neg Hx        SOCIAL HISTORY:    Social History     Social History    Marital status:      Spouse name: N/A    Number of children: N/A    Years of education: N/A     Occupational History    Not on file.     Social History Main Topics    Smoking status: Never Smoker    Smokeless tobacco: Never Used    Alcohol use Yes      Comment: rare  September    Drug use: No    Sexual activity: Not Currently     Other Topics Concern    Not on file     Social History Narrative    No narrative on file       PHYSICAL EXAM:  VITAL SIGNS:   Vitals:    06/07/18 0901   BP: 118/86   BP Location: Left arm   Patient Position: Sitting   BP Method: Medium (Automatic)   Pulse: 103   Temp: 98.5 °F (36.9 °C)   TempSrc: Oral   Weight: 76 kg (167 lb 8.8 oz)   Height: 5' 1.5" (1.562 m)     GENERAL:  Patient appears well nourished, sitting on exam table, in no acute distress.  HEENT:  Atraumatic, normocephalic, PERRLA, EOMI, no conjunctival injection, sclerae are anicteric, normal external auditory canals,TMs clear b/l, gross hearing intact to whisper, MMM, no oropharygneal erythema or exudate.  NECK:  Supple, normal ROM, trachea is midline , no supraclavicular or cervical LAD or masses palpated.  Thyroid gland not palpable.  CARDIOVASCULAR:  RRR, normal S1 and S2, no m/r/g.  RESPIRATORY:  CTA b/l, no wheezes, rhonchi, rales.  No increased work of breathing, no  use of accessory muscles.  ABDOMEN:  Soft, nontender, nondistended, normoactive bowel sounds in all four quadrants, no rebound or guarding, no HSM or masses palpated.  Normal percussion.  EXTREMITIES:  2+ DP pulses b/l, no edema.  SKIN:  Warm, no lesions on exposed skin.  NEUROMUSCULAR:  Cranial nerves II-XII grossly intact. 2+ biceps and patellar reflexes b/l. No clubbing or cyanosis of digits/nails.  Steady gait.  PSYCH:  Patient " is alert and oriented to person, time, place. They are appropriately dressed and groomed. There is normal eye contact. Rate and tone of speech is normal. Normal insight, judgement. Normal thought content and process.     LABORATORY/IMAGING STUDIES: pending     ASSESSMENT/PLAN: This is a 53 y.o. female who presents to clinic for evaluation of the following concerns.  1. Routine medical exam  - We will check a screening CBC, CMP, TSH, fasting lipid panel. Patient will be notified of these results and the need for any further evaluation and treatment. She will follow up with her OB/GYN for her annual exam. She will follow up with her dentist/optometrist as scheduled. We discussed the importance of increasing her exercise, continuing with a healthy, well-balanced diet.     2. Screening for colon cancer  - Ambulatory referral to Gastroenterology    3. Need for hepatitis C screening test  - Hepatitis C antibody; Future    4. Hypertension, unspecified type  See below    5. Obesity, Class I, BMI 30-34.9  See below    6. Right flank pain   -POCT urine demonstrated trace blood and leukocyte esterase.  UCX pending. Will start bactrim DS and  patient was instructed to continue with aleve, flexeril, may apply heat.    Patient readiness: acceptance and barriers:none    During the course of the visit the patient was educated and counseled about the following:     Hypertension:   Medication: no change.  Obesity:   General weight loss/lifestyle modification strategies discussed (elicit support from others; identify saboteurs; non-food rewards, etc).  Diet interventions: moderate (500 kCal/d) deficit diet.  Informal exercise measures discussed, e.g. taking stairs instead of elevator.    Goals: Hypertension: Reduce Blood Pressure and Obesity: Reduce calorie intake and BMI    Did patient meet goals/outcomes: No    The following self management tools provided: declined    Patient Instructions (the written plan) was given to the  patient/family.     Time spent with patient: 30 minutes      FOLLOW UP:  6 months      Karolyn Obando MD

## 2018-06-07 NOTE — TELEPHONE ENCOUNTER
The pharmacist stated she has been getting 15 the last couple of months due to her insurance, but she has refills on them

## 2018-06-08 LAB
BACTERIA UR CULT: NORMAL
HCV AB SERPL QL IA: NEGATIVE

## 2018-06-08 NOTE — TELEPHONE ENCOUNTER
Called Pharmacy. Her insurance will only cover #15 tablets in 25 days. To get #30, we will have to obtain a PA. Pharmacy will fax over the number to call for the PA. I will let pt know. Thanks, Nasra

## 2018-06-11 ENCOUNTER — TELEPHONE (OUTPATIENT)
Dept: FAMILY MEDICINE | Facility: CLINIC | Age: 53
End: 2018-06-11

## 2018-06-11 NOTE — TELEPHONE ENCOUNTER
Spoke to jerri with [facility]. Completed Pa for zolpidem 10mg 30 pills for 30days.approved for 36 months.  Next refill date June 19th.  Notified patient via [facility]

## 2018-06-13 ENCOUNTER — PATIENT OUTREACH (OUTPATIENT)
Dept: ADMINISTRATIVE | Facility: HOSPITAL | Age: 53
End: 2018-06-13

## 2018-10-04 DIAGNOSIS — Z12.11 COLON CANCER SCREENING: ICD-10-CM

## 2018-10-04 DIAGNOSIS — Z12.39 BREAST CANCER SCREENING: ICD-10-CM

## 2018-10-12 DIAGNOSIS — L71.9 ROSACEA: ICD-10-CM

## 2018-10-12 RX ORDER — DOXYCYCLINE 40 MG/1
CAPSULE ORAL
Qty: 30 CAPSULE | Refills: 5 | Status: SHIPPED | OUTPATIENT
Start: 2018-10-12 | End: 2019-02-04 | Stop reason: SDUPTHER

## 2018-12-05 ENCOUNTER — DOCUMENTATION ONLY (OUTPATIENT)
Dept: FAMILY MEDICINE | Facility: CLINIC | Age: 53
End: 2018-12-05

## 2018-12-05 NOTE — PROGRESS NOTES
Pre-Visit Chart Review  For Appointment Scheduled on 12/11/2018    Health Maintenance Due   Topic Date Due    Colonoscopy  05/11/2014    Influenza Vaccine  08/01/2018    Mammogram  08/16/2018

## 2018-12-09 RX ORDER — CYCLOBENZAPRINE HCL 10 MG
10 TABLET ORAL NIGHTLY PRN
Qty: 30 TABLET | Refills: 6 | Status: SHIPPED | OUTPATIENT
Start: 2018-12-09 | End: 2019-06-24 | Stop reason: SDUPTHER

## 2018-12-11 ENCOUNTER — OFFICE VISIT (OUTPATIENT)
Dept: FAMILY MEDICINE | Facility: CLINIC | Age: 53
End: 2018-12-11
Payer: COMMERCIAL

## 2018-12-11 VITALS
TEMPERATURE: 98 F | BODY MASS INDEX: 31.56 KG/M2 | HEIGHT: 62 IN | HEART RATE: 103 BPM | DIASTOLIC BLOOD PRESSURE: 88 MMHG | WEIGHT: 171.5 LBS | SYSTOLIC BLOOD PRESSURE: 140 MMHG

## 2018-12-11 DIAGNOSIS — I10 HYPERTENSION, UNSPECIFIED TYPE: Primary | ICD-10-CM

## 2018-12-11 DIAGNOSIS — E78.2 HYPERLIPIDEMIA, MIXED: ICD-10-CM

## 2018-12-11 DIAGNOSIS — E66.9 OBESITY, CLASS I, BMI 30-34.9: ICD-10-CM

## 2018-12-11 DIAGNOSIS — Z12.11 SCREENING FOR COLON CANCER: ICD-10-CM

## 2018-12-11 DIAGNOSIS — R00.0 TACHYCARDIA: ICD-10-CM

## 2018-12-11 PROCEDURE — 99214 OFFICE O/P EST MOD 30 MIN: CPT | Mod: S$GLB,,, | Performed by: FAMILY MEDICINE

## 2018-12-11 PROCEDURE — 3074F SYST BP LT 130 MM HG: CPT | Mod: CPTII,S$GLB,, | Performed by: FAMILY MEDICINE

## 2018-12-11 PROCEDURE — 3080F DIAST BP >= 90 MM HG: CPT | Mod: CPTII,S$GLB,, | Performed by: FAMILY MEDICINE

## 2018-12-11 PROCEDURE — 3008F BODY MASS INDEX DOCD: CPT | Mod: CPTII,S$GLB,, | Performed by: FAMILY MEDICINE

## 2018-12-11 PROCEDURE — 99999 PR PBB SHADOW E&M-EST. PATIENT-LVL III: CPT | Mod: PBBFAC,,, | Performed by: FAMILY MEDICINE

## 2018-12-11 RX ORDER — METRONIDAZOLE 10 MG/G
GEL TOPICAL DAILY
Qty: 60 G | Refills: 11 | Status: SHIPPED | OUTPATIENT
Start: 2018-12-11 | End: 2022-03-10 | Stop reason: SDUPTHER

## 2018-12-11 RX ORDER — ZOLPIDEM TARTRATE 10 MG/1
10 TABLET ORAL NIGHTLY
Qty: 30 TABLET | Refills: 5 | Status: SHIPPED | OUTPATIENT
Start: 2018-12-11 | End: 2019-06-24 | Stop reason: SDUPTHER

## 2018-12-11 RX ORDER — AMLODIPINE AND BENAZEPRIL HYDROCHLORIDE 10; 20 MG/1; MG/1
1 CAPSULE ORAL DAILY
Qty: 90 CAPSULE | Refills: 3 | Status: SHIPPED | OUTPATIENT
Start: 2018-12-11 | End: 2019-10-25

## 2018-12-11 NOTE — PROGRESS NOTES
CHIEF COMPLAINT:  Follow up HTN, hyperlipidemia      HISTORY OF PRESENT ILLNESS:  Karlene Damon is a 53 y.o. female who presents to clinic for follow up on her chronic medical conditions.    1. HTN: She is on lotrel. She denies any side effects. She does not monitor her BP. She states that last month she was seen in  clinic twice and told she was tachycardic. She was sick with bronchitis at the time. She states that 2 weeks ago she went to donate blood and was also told she was tachycardic. She is asymptomatic    2. Hyperlipidemia: she is on zocor. She denies any myalgia. Dark colored urine. She is due for labs.    3. She has not yet had a chance to schedule her colonoscopy.    4. She will be scheduling an appointment with Dr. Amin, OB-GYN.        REVIEW OF SYSTEMS:  The patient denies any fever, chills, night sweats, headaches, vision changes, difficulty speaking or swallowing, decreased hearing, weight loss, weight gain, chest pain, palpitations, shortness of breath, cough, nausea, vomiting, abdominal pain, dysuria, diarrhea, constipation, hematuria, hematochezia, melena, changes in her hair, skin, nails, numbness or weakness in her extremities, erythema, pain or swelling over any of her joints, myalgia, swollen glands, easy bruising, fatigue, edema, symptoms of anxiety or depression. She denies any vaginal discharge, breast masses, nipple discharge, change in the skin overlying her breasts.      MEDICATIONS:   Reviewed and/or reconciled in EPIC    ALLERGIES:  Reviewed and/or reconciled in [facility]    PAST MEDICAL/SURGICAL HISTORY:   Past Medical History:   Diagnosis Date    Back spasm 6/7/2018    High cholesterol     Hormone replacement therapy (HRT) 6/7/2018    HTN (hypertension) 6/7/2018    Hyperlipidemia, mixed 6/7/2018    Hypertension     Insomnia 6/7/2018    Rosacea 6/7/2018      Past Surgical History:   Procedure Laterality Date    APPENDECTOMY      ARTHROSCOPY-KNEE Right 12/12/2017     "[facility] at [facility] OR    ARTHROSCOPY-KNEE W/ CHONDROPLASTY  12/12/2017    [facility] OR    BREAST SURGERY Left     benign cyst removal x2    GALLBLADDER SURGERY      nephrolithiasis      PLANTAR FASCIA SURGERY Left     right knee arthroscopy      SHOULDER SURGERY Right     arthroscopy    TOTAL ABDOMINAL HYSTERECTOMY W/ BILATERAL SALPINGOOPHORECTOMY      ovarian cysts       FAMILY HISTORY:    Family History   Problem Relation Age of Onset    Hypertension Mother     Hypertension Father     Stroke Father 51    Diabetes type II Father     Polycystic kidney disease Brother     Breast cancer Other     Bone cancer Maternal Aunt     Melanoma Neg Hx     Psoriasis Neg Hx     Lupus Neg Hx     Eczema Neg Hx        SOCIAL HISTORY:    Social History     Socioeconomic History    Marital status:      Spouse name: Not on file    Number of children: Not on file    Years of education: Not on file    Highest education level: Not on file   Social Needs    Financial resource strain: Not on file    Food insecurity - worry: Not on file    Food insecurity - inability: Not on file    Transportation needs - medical: Not on file    Transportation needs - non-medical: Not on file   Occupational History    Not on file   Tobacco Use    Smoking status: Never Smoker    Smokeless tobacco: Never Used   Substance and Sexual Activity    Alcohol use: Yes     Comment: rare    Drug use: No    Sexual activity: Not Currently   Other Topics Concern    Are you pregnant or think you may be? Not Asked    Breast-feeding Not Asked   Social History Narrative    Not on file       PHYSICAL EXAM:  VITAL SIGNS:   Vitals:    12/11/18 1259   BP: (!) 138/98   Pulse: 103   Temp: 97.9 °F (36.6 °C)   Weight: 77.8 kg (171 lb 8.3 oz)   Height: 5' 1.5" (1.562 m)     GENERAL:  Patient appears well nourished, sitting on exam table, in no acute distress.  HEENT:  Atraumatic, " normocephalic, PERRLA, EOMI, no conjunctival injection, sclerae are anicteric, normal external auditory canals,TMs clear b/l, gross hearing intact to whisper, MMM, no oropharygneal erythema or exudate.  NECK:  Supple, normal ROM, trachea is midline , no supraclavicular or cervical LAD or masses palpated.  Thyroid gland not palpable.  CARDIOVASCULAR:  RRR, normal S1 and S2, no m/r/g.  RESPIRATORY:  CTA b/l, no wheezes, rhonchi, rales.  No increased work of breathing, no  use of accessory muscles.  ABDOMEN:  Soft, nontender, nondistended, normoactive bowel sounds in all four quadrants, no rebound or guarding, no HSM or masses palpated.  Normal percussion.  EXTREMITIES:  2+ DP pulses b/l, no edema.  SKIN:  Warm, no lesions on exposed skin.  NEUROMUSCULAR:  Cranial nerves II-XII grossly intact.  Strength is 4+/5 over upper and lower extremity flexors/extensors b/l, 2+ biceps and patellar reflexes b/l. No clubbing or cyanosis of digits/nails.  Steady gait.  PSYCH:  Patient is alert and oriented to person, time, place. They are appropriately dressed and groomed. There is normal eye contact. Rate and tone of speech is normal. Normal insight, judgement. Normal thought content and process.     LABORATORY/IMAGING STUDIES: pending     ASSESSMENT/PLAN: This is a 53 y.o. female who presents to clinic for evaluation of the following concerns.  1. Hypertension, unspecified type  See below    2. Hyperlipidemia, mixed  - Comprehensive metabolic panel; Future  - Lipid panel; Future    3. Obesity, Class I, BMI 30-34.9  See below    4.  Tachycardia  -cmp  - Magnesium; Future  - Holter monitor - 48 hour (Cupid Only); Future    6. Screening for colon cancer  - Ambulatory referral to Gastroenterology        Patient readiness: acceptance and barriers:none    During the course of the visit the patient was educated and counseled about the following:     Hypertension:   increase lotrel to 10-20 mg, follow up in 2-3 weeks for nurse bp  check  Obesity:   General weight loss/lifestyle modification strategies discussed (elicit support from others; identify saboteurs; non-food rewards, etc).  Diet interventions: moderate (500 kCal/d) deficit diet.  Informal exercise measures discussed, e.g. taking stairs instead of elevator.  Regular aerobic exercise program discussed.    Goals: Hypertension: Reduce Blood Pressure and Obesity: Reduce calorie intake and BMI    Did patient meet goals/outcomes: No    The following self management tools provided: declined    Patient Instructions (the written plan) was given to the patient/family.     Time spent with patient: 30 minutes      FOLLOW UP:  6 months      Karolyn Obando MD

## 2018-12-15 ENCOUNTER — LAB VISIT (OUTPATIENT)
Dept: LAB | Facility: HOSPITAL | Age: 53
End: 2018-12-15
Attending: FAMILY MEDICINE
Payer: COMMERCIAL

## 2018-12-15 DIAGNOSIS — E78.2 HYPERLIPIDEMIA, MIXED: ICD-10-CM

## 2018-12-15 DIAGNOSIS — R00.0 TACHYCARDIA: ICD-10-CM

## 2018-12-15 LAB
ALBUMIN SERPL BCP-MCNC: 3.7 G/DL
ALP SERPL-CCNC: 63 U/L
ALT SERPL W/O P-5'-P-CCNC: 16 U/L
ANION GAP SERPL CALC-SCNC: 11 MMOL/L
AST SERPL-CCNC: 22 U/L
BILIRUB SERPL-MCNC: 0.4 MG/DL
BUN SERPL-MCNC: 22 MG/DL
CALCIUM SERPL-MCNC: 9.6 MG/DL
CHLORIDE SERPL-SCNC: 101 MMOL/L
CHOLEST SERPL-MCNC: 136 MG/DL
CHOLEST/HDLC SERPL: 2.8 {RATIO}
CO2 SERPL-SCNC: 25 MMOL/L
CREAT SERPL-MCNC: 0.9 MG/DL
EST. GFR  (AFRICAN AMERICAN): >60 ML/MIN/1.73 M^2
EST. GFR  (NON AFRICAN AMERICAN): >60 ML/MIN/1.73 M^2
GLUCOSE SERPL-MCNC: 107 MG/DL
HDLC SERPL-MCNC: 48 MG/DL
HDLC SERPL: 35.3 %
LDLC SERPL CALC-MCNC: 45 MG/DL
MAGNESIUM SERPL-MCNC: 1.8 MG/DL
NONHDLC SERPL-MCNC: 88 MG/DL
POTASSIUM SERPL-SCNC: 3.7 MMOL/L
PROT SERPL-MCNC: 7.3 G/DL
SODIUM SERPL-SCNC: 137 MMOL/L
TRIGL SERPL-MCNC: 215 MG/DL

## 2018-12-15 PROCEDURE — 80053 COMPREHEN METABOLIC PANEL: CPT

## 2018-12-15 PROCEDURE — 83735 ASSAY OF MAGNESIUM: CPT

## 2018-12-15 PROCEDURE — 80061 LIPID PANEL: CPT

## 2018-12-15 PROCEDURE — 36415 COLL VENOUS BLD VENIPUNCTURE: CPT | Mod: PO

## 2018-12-26 ENCOUNTER — TELEPHONE (OUTPATIENT)
Dept: FAMILY MEDICINE | Facility: CLINIC | Age: 53
End: 2018-12-26

## 2018-12-26 ENCOUNTER — CLINICAL SUPPORT (OUTPATIENT)
Dept: FAMILY MEDICINE | Facility: CLINIC | Age: 53
End: 2018-12-26
Payer: COMMERCIAL

## 2018-12-26 VITALS — DIASTOLIC BLOOD PRESSURE: 74 MMHG | SYSTOLIC BLOOD PRESSURE: 136 MMHG | HEART RATE: 64 BPM

## 2018-12-26 DIAGNOSIS — I10 HYPERTENSION, UNSPECIFIED TYPE: Primary | ICD-10-CM

## 2018-12-26 PROCEDURE — 99999 PR PBB SHADOW E&M-EST. PATIENT-LVL I: CPT | Mod: PBBFAC,,, | Performed by: FAMILY MEDICINE

## 2018-12-26 PROCEDURE — 99499 UNLISTED E&M SERVICE: CPT | Mod: S$GLB,,, | Performed by: FAMILY MEDICINE

## 2018-12-26 RX ORDER — TRIAMTERENE AND HYDROCHLOROTHIAZIDE 37.5; 25 MG/1; MG/1
1 CAPSULE ORAL DAILY PRN
Qty: 90 CAPSULE | Refills: 3 | Status: SHIPPED | OUTPATIENT
Start: 2018-12-26 | End: 2023-04-21

## 2018-12-26 NOTE — PROGRESS NOTES
Patient comes in for a nurse BP check. Patient's BP was 136/74 on RA manually. Patient's pulse was 64. Patient did not bring in any home BP logs nor his own BP machine.

## 2018-12-27 NOTE — TELEPHONE ENCOUNTER
Spoke with patient and gave her the message per Dr. Obando. Patient confirmed her understanding without further questions.

## 2019-01-18 RX ORDER — SIMVASTATIN 10 MG/1
TABLET, FILM COATED ORAL
Qty: 90 TABLET | Refills: 3 | Status: SHIPPED | OUTPATIENT
Start: 2019-01-18 | End: 2019-10-25

## 2019-01-22 DIAGNOSIS — Z12.11 SCREENING FOR COLON CANCER: Primary | ICD-10-CM

## 2019-02-04 DIAGNOSIS — L71.9 ROSACEA: ICD-10-CM

## 2019-02-04 RX ORDER — DOXYCYCLINE 40 MG/1
CAPSULE ORAL
Qty: 30 CAPSULE | Refills: 4 | Status: SHIPPED | OUTPATIENT
Start: 2019-02-04 | End: 2019-05-28 | Stop reason: SDUPTHER

## 2019-02-19 ENCOUNTER — TELEPHONE (OUTPATIENT)
Dept: GASTROENTEROLOGY | Facility: CLINIC | Age: 54
End: 2019-02-19

## 2019-02-19 NOTE — TELEPHONE ENCOUNTER
----- Message from Chely Zheng sent at 2/19/2019  9:17 AM CST -----  Contact: Karlene  Type: Needs Medical Advice    Who Called:  patient  Best Call Back Number: [phone]  Additional Information:  States was called to let know procedure with Dr Rosario was being rescheduled with Dr Cornell. Stated something has came up and would like on the reschedule to be put back on Dr Rosario's schedule. Thanks!

## 2019-03-18 ENCOUNTER — TELEPHONE (OUTPATIENT)
Dept: GASTROENTEROLOGY | Facility: CLINIC | Age: 54
End: 2019-03-18

## 2019-03-18 NOTE — TELEPHONE ENCOUNTER
----- Message from Carlita Jorge sent at 3/18/2019 10:17 AM CDT -----  Contact: Patient  Type:  Patient Returning Call    Who Called:  Patient   Who Left Message for Patient:  Unsure  Does the patient know what this is regarding?:  Scheduling appt  Best Call Back Number:    Additional Information:

## 2019-03-26 ENCOUNTER — PATIENT OUTREACH (OUTPATIENT)
Dept: ADMINISTRATIVE | Facility: HOSPITAL | Age: 54
End: 2019-03-26

## 2019-04-12 ENCOUNTER — HOSPITAL ENCOUNTER (OUTPATIENT)
Facility: HOSPITAL | Age: 54
Discharge: HOME | End: 2019-04-12
Attending: INTERNAL MEDICINE | Admitting: INTERNAL MEDICINE
Payer: COMMERCIAL

## 2019-04-12 ENCOUNTER — ANESTHESIA EVENT (OUTPATIENT)
Dept: ENDOSCOPY | Facility: HOSPITAL | Age: 54
End: 2019-04-12
Payer: COMMERCIAL

## 2019-04-12 ENCOUNTER — ANESTHESIA (OUTPATIENT)
Dept: ENDOSCOPY | Facility: HOSPITAL | Age: 54
End: 2019-04-12
Payer: COMMERCIAL

## 2019-04-12 VITALS
TEMPERATURE: 98 F | HEART RATE: 89 BPM | WEIGHT: 160 LBS | SYSTOLIC BLOOD PRESSURE: 120 MMHG | OXYGEN SATURATION: 99 % | HEIGHT: 62 IN | DIASTOLIC BLOOD PRESSURE: 69 MMHG | RESPIRATION RATE: 12 BRPM | BODY MASS INDEX: 29.44 KG/M2

## 2019-04-12 DIAGNOSIS — Z12.11 SCREENING FOR COLON CANCER: ICD-10-CM

## 2019-04-12 DIAGNOSIS — K57.30 DIVERTICULOSIS OF LARGE INTESTINE WITHOUT HEMORRHAGE: ICD-10-CM

## 2019-04-12 DIAGNOSIS — K63.5 POLYP OF COLON, UNSPECIFIED PART OF COLON, UNSPECIFIED TYPE: Primary | ICD-10-CM

## 2019-04-12 DIAGNOSIS — K64.8 INTERNAL HEMORRHOIDS: ICD-10-CM

## 2019-04-12 PROCEDURE — 45381 COLONOSCOPY SUBMUCOUS NJX: CPT | Mod: 51,,, | Performed by: INTERNAL MEDICINE

## 2019-04-12 PROCEDURE — 45385 COLONOSCOPY W/LESION REMOVAL: CPT | Mod: 33,,, | Performed by: INTERNAL MEDICINE

## 2019-04-12 PROCEDURE — 45381 PR COLONOSCPY,FLEX,W/DIR SUBMUC INJECT: ICD-10-PCS | Mod: 51,,, | Performed by: INTERNAL MEDICINE

## 2019-04-12 PROCEDURE — 45385 PR COLONOSCOPY,REMV LESN,SNARE: ICD-10-PCS | Mod: 33,,, | Performed by: INTERNAL MEDICINE

## 2019-04-12 PROCEDURE — 45380 COLONOSCOPY AND BIOPSY: CPT | Mod: 59,,, | Performed by: INTERNAL MEDICINE

## 2019-04-12 PROCEDURE — D9220A PRA ANESTHESIA: ICD-10-PCS | Mod: 33,ANES,, | Performed by: ANESTHESIOLOGY

## 2019-04-12 PROCEDURE — 27201012 HC FORCEPS, HOT/COLD, DISP: Performed by: INTERNAL MEDICINE

## 2019-04-12 PROCEDURE — 25000003 PHARM REV CODE 250: Performed by: INTERNAL MEDICINE

## 2019-04-12 PROCEDURE — 37000008 HC ANESTHESIA 1ST 15 MINUTES: Performed by: INTERNAL MEDICINE

## 2019-04-12 PROCEDURE — 45385 COLONOSCOPY W/LESION REMOVAL: CPT | Performed by: INTERNAL MEDICINE

## 2019-04-12 PROCEDURE — 88305 TISSUE EXAM BY PATHOLOGIST: CPT | Performed by: PATHOLOGY

## 2019-04-12 PROCEDURE — 27200997: Performed by: INTERNAL MEDICINE

## 2019-04-12 PROCEDURE — 63600175 PHARM REV CODE 636 W HCPCS: Performed by: NURSE ANESTHETIST, CERTIFIED REGISTERED

## 2019-04-12 PROCEDURE — 45380 COLONOSCOPY AND BIOPSY: CPT | Performed by: INTERNAL MEDICINE

## 2019-04-12 PROCEDURE — D9220A PRA ANESTHESIA: Mod: 33,ANES,, | Performed by: ANESTHESIOLOGY

## 2019-04-12 PROCEDURE — D9220A PRA ANESTHESIA: ICD-10-PCS | Mod: 33,CRNA,, | Performed by: NURSE ANESTHETIST, CERTIFIED REGISTERED

## 2019-04-12 PROCEDURE — 88305 TISSUE SPECIMEN TO PATHOLOGY - SURGERY: ICD-10-PCS | Mod: 26,,, | Performed by: PATHOLOGY

## 2019-04-12 PROCEDURE — 37000009 HC ANESTHESIA EA ADD 15 MINS: Performed by: INTERNAL MEDICINE

## 2019-04-12 PROCEDURE — 27201028 HC NEEDLE, SCLERO: Performed by: INTERNAL MEDICINE

## 2019-04-12 PROCEDURE — 27201089 HC SNARE, DISP (ANY): Performed by: INTERNAL MEDICINE

## 2019-04-12 PROCEDURE — 45380 PR COLONOSCOPY,BIOPSY: ICD-10-PCS | Mod: 59,,, | Performed by: INTERNAL MEDICINE

## 2019-04-12 PROCEDURE — 88305 TISSUE EXAM BY PATHOLOGIST: CPT | Mod: 26,,, | Performed by: PATHOLOGY

## 2019-04-12 PROCEDURE — D9220A PRA ANESTHESIA: Mod: 33,CRNA,, | Performed by: NURSE ANESTHETIST, CERTIFIED REGISTERED

## 2019-04-12 PROCEDURE — 45381 COLONOSCOPY SUBMUCOUS NJX: CPT | Performed by: INTERNAL MEDICINE

## 2019-04-12 RX ORDER — LIDOCAINE HCL/PF 100 MG/5ML
SYRINGE (ML) INTRAVENOUS
Status: DISCONTINUED | OUTPATIENT
Start: 2019-04-12 | End: 2019-04-12

## 2019-04-12 RX ORDER — SODIUM CHLORIDE 9 MG/ML
INJECTION, SOLUTION INTRAVENOUS CONTINUOUS
Status: DISCONTINUED | OUTPATIENT
Start: 2019-04-12 | End: 2019-04-12 | Stop reason: HOSPADM

## 2019-04-12 RX ORDER — PROPOFOL 10 MG/ML
VIAL (ML) INTRAVENOUS
Status: DISCONTINUED | OUTPATIENT
Start: 2019-04-12 | End: 2019-04-12

## 2019-04-12 RX ADMIN — LIDOCAINE HYDROCHLORIDE 100 MG: 20 INJECTION, SOLUTION INTRAVENOUS at 01:04

## 2019-04-12 RX ADMIN — PROPOFOL 100 MG: 10 INJECTION, EMULSION INTRAVENOUS at 01:04

## 2019-04-12 RX ADMIN — PROPOFOL 50 MG: 10 INJECTION, EMULSION INTRAVENOUS at 01:04

## 2019-04-12 RX ADMIN — SODIUM CHLORIDE 1000 ML: 0.9 INJECTION, SOLUTION INTRAVENOUS at 12:04

## 2019-04-12 NOTE — H&P
CC: Screening for colorectal cancer - last scope greater than 10 years ago    53 year old female with above. States that symptoms are absent, no alleviating/exacerbating factors. No family history of CA. No personal history of polyps. No bleeding or weight loss.     ROS:  No headache, no fever/chills, no chest pain/SOB, no nausea/vomiting/diarrhea/constipation/GI bleeding/abdominal pain, no dysuria/hematuria.    VSSAF   Exam:   Alert and oriented x 3; no apparent distress   PERRLA, sclera anicteric  CV: Regular rate/rhythm, normal PMI   Lungs: Clear bilaterally with no wheeze/rales   Abdomen: Soft, NT/ND, normal bowel sounds   Ext: No cyanosis, clubbing     Impression:   As above    Plan:   Proceed with endoscopy. Further recs to follow.

## 2019-04-12 NOTE — [employer] PATIENT INSTRUCTIONS
Discharge Summary/Instructions after an Endoscopic Procedure  Patient Name: Karlene Damon  Patient MRN: [id number]  Patient YOB: 1965 Friday, April 12, 2019  Dennis Cifuentes MD  RESTRICTIONS:  During your procedure today, you received medications for sedation.  These   medications may affect your judgment, balance and coordination.  Therefore,   for 24 hours, you have the following restrictions:   - DO NOT drive a car, operate machinery, make legal/financial decisions,   sign important papers or drink alcohol.    ACTIVITY:  Today: no heavy lifting, straining or running due to procedural   sedation/anesthesia.  The following day: return to full activity including work.  DIET:  Eat and drink normally unless instructed otherwise.     TREATMENT FOR COMMON SIDE EFFECTS:  - Mild abdominal pain, nausea, belching, bloating or excessive gas:  rest,   eat lightly and use a heating pad.  - Sore Throat: treat with throat lozenges and/or gargle with warm salt   water.  - Because air was used during the procedure, expelling large amounts of air   from your rectum or belching is normal.  - If a bowel prep was taken, you may not have a bowel movement for 1-3 days.    This is normal.  SYMPTOMS TO WATCH FOR AND REPORT TO YOUR PHYSICIAN:  1. Abdominal pain or bloating, other than gas cramps.  2. Chest pain.  3. Back pain.  4. Signs of infection such as: chills or fever occurring within 24 hours   after the procedure.  5. Rectal bleeding, which would show as bright red, maroon, or black stools.   (A tablespoon of blood from the rectum is not serious, especially if   hemorrhoids are present.)  6. Vomiting.  7. Weakness or dizziness.  GO DIRECTLY TO THE NEAREST EMERGENCY ROOM IF YOU HAVE ANY OF THE FOLLOWING:      Difficulty breathing              Chills and/or fever over 101 F   Persistent vomiting and/or vomiting blood   Severe abdominal pain   Severe chest pain   Black, tarry stools   Bleeding- more than one  tablespoon   Any other symptom or condition that you feel may need urgent attention  Your doctor recommends these additional instructions:  If any biopsies were taken, your doctors clinic will contact you in 1 to 2   weeks with any results.  - Patient has a contact number available for emergencies.  The signs and   symptoms of potential delayed complications were discussed with the   patient.  Return to normal activities tomorrow.  Written discharge   instructions were provided to the patient.   - High fiber diet.   - Continue present medications.   - No aspirin, ibuprofen, naproxen, or other non-steroidal anti-inflammatory   drugs for 2 weeks after polyp removal.   - Await pathology results.   - Repeat colonoscopy in 3 years for surveillance.   - Discharge patient to home (ambulatory).   - Return to my office PRN.  For questions, problems or results please call your physician - Dennis Cifuentes MD at Work:  [phone].  [facility], EMERGENCY ROOM PHONE NUMBER: [phone]  IF A COMPLICATION OR EMERGENCY SITUATION ARISES AND YOU ARE UNABLE TO REACH   YOUR PHYSICIAN - GO DIRECTLY TO THE EMERGENCY ROOM.  Dennis Cifuentes MD  4/12/2019 1:54:07 PM  This report has been verified and signed electronically.  [employer]

## 2019-04-12 NOTE — DISCHARGE INSTRUCTIONS
Hemorrhoids    Hemorrhoids are swollen and inflamed veins inside the rectum and near the anus. The rectum is the last several inches of the colon. The anus is the passage between the rectum and the outside of the body.  Causes  The veins can become swollen due to increased pressure in them. This is most often caused by:  · Chronic constipation or diarrhea  · Straining when having a bowel movement  · Sitting too long on the toilet  · A low-fiber diet  · Pregnancy  Symptoms  · Bleeding from the rectum (this may be noticeable after bowel movements)  · Lump near the anus  · Itching around the anus  · Pain around the anus  There are different types of hemorrhoids. Depending on the type you have and the severity, you may be able to treat yourself at home. In some cases, a procedure may be the best treatment option. Your healthcare provider can tell you more about this, if needed.  Home care  General care  · To get relief from pain or itching, try:  ¨ Topical products. Your healthcare provider may prescribe or recommend creams, ointments, or pads that can be applied to the hemorrhoid. Use these exactly as directed.  ¨ Medicines. Your healthcare provider may recommend stool softeners, suppositories, or laxatives to help manage constipation. Use these exactly as directed.  ¨ Sitz baths. A sitz bath involves sitting in a few inches of warm bath water. Be careful not to make the water so hot that you burn yourself--test it before sitting in it. Soak for about 10 to 15 minutes a few times a day. This may help relieve pain.  Tips to help prevent hemorrhoids  · Eat more fiber. Fiber adds bulk to stool and absorbs water as it moves through your colon. This makes stool softer and easier to pass.  ¨ Increase the fiber in your diet with more fiber-rich foods. These include fresh fruit, vegetables, and whole grains.  ¨ Take a fiber supplement or bulking agent, if advised to by your provider. These include products such as psyllium  or methylcellulose.  · Drink plenty of water, if directed to by your provider. This can help keep stool soft.  · Be more active. Frequent exercise aids digestion and helps prevent constipation. It may also help make bowel movements more regular.  · Dont strain during bowel movements. This can make hemorrhoids more likely. Also, dont sit on the toilet for long periods of time.  Follow-up care  Follow up with your healthcare provider, or as advised. If a culture or imaging tests were done, you will be notified of the results when they are ready. This may take a few days or longer.  When to seek medical advice  Call your healthcare provider right away if any of these occur:  · Increased bleeding from the rectum  · Increased pain around the rectum or anus  · Weakness or dizziness  Call 911  Call 911 or return to the emergency department right away if any of these occur:  · Trouble breathing or swallowing  · Fainting or loss of consciousness  · Unusually fast heart rate  · Vomiting blood  · Large amounts of blood in stool  Date Last Reviewed: 6/22/2015 © 2000-2017 [employer]. [address] [place] [address]. All rights reserved. This information is not intended as a substitute for professional medical care. Always follow your healthcare professional's instructions.        Diverticulosis    Diverticulosis means that small pouches have formed in the wall of your large intestine (colon). Most often, this problem causes no symptoms and is common as people age. But the pouches in the colon are at risk of becoming infected. When this happens, the condition is called diverticulitis. Although most people with diverticulosis never develop diverticulitis, it is still not uncommon. Rectal bleeding can also occur and in less common situations, a type of colon inflammation called colitis.  While most people do not have symptoms, some people with diverticulosis may have:  · Abdominal cramps and  pain  · Bloating  · Constipation  · Change in bowel habits  Causes  The exact cause of diverticulosis (and diverticulitis) has not been proved, but a few things are associated with the condition:  · Low-fiber diet  · Constipation  · Lack of exercise  Your healthcare provider will talk with you about how to manage your condition. Diet changes may be all that are needed to help control diverticulosis and prevent progression to diverticulitis. If you develop diverticulitis, you will likely need other treatments.  Home care  You may be told to take fiber supplements daily. Fiber adds bulk to the stool so that it passes through the colon more easily. Stool softeners may be recommended. You may also be given medications for pain relief. Be sure to take all medications as directed.  In the past, people were told to avoid corn, nuts, and seeds. This is no longer necessary.  Follow these guidelines when caring for yourself at home:  · Eat unprocessed foods that are high in fiber. Whole grains, fruits, and vegetables are good choices.  · Drink 6 to 8 glasses of water every day unless your healthcare provider has you limit how much fluid you should have.  · Watch for changes in your bowel movements. Tell your provider if you notice any changes.  · Begin an exercise program. Ask your provider how to get started. Generally, walking is the best.  · Get plenty of rest and sleep.  Follow-up care  Follow up with your healthcare provider, or as advised. Regular visits may be needed to check on your health. Sometimes special procedures such as colonoscopy, are needed after an episode of diverticulitis or blooding. Be sure to keep all your appointments.  If a stool sample was taken, or cultures were done, you should be told if they are positive, or if your treatment needs to be changed. You can call as directed for the results.  If X-rays were done, a radiologist will look at them. You will be told if there is a change in your  treatment.  If antibiotics were prescribed, be sure to finish them all.  When to seek medical advice  Call your healthcare provider right away if any of these occur:  · Fever of 100.4°F (38°C) or higher, or as directed by your healthcare provider  · Severe cramps in the lower left side of the abdomen or pain that is getting worse  · Tenderness in the lower left side of the abdomen or worsening pain throughout the abdomen  · Diarrhea or constipation that doesn't get better within 24 hours  · Nausea and vomiting  · Bleeding from the rectum  Call 911  Call emergency services if any of the following occur:  · Trouble breathing  · Confusion  · Very drowsy or trouble awakening  · Fainting or loss of consciousness  · Rapid heart rate  · Chest pain  Date Last Reviewed: 12/30/2015 © 2000-2017 [employer]. [address] [place] [address], [place] [address]. All rights reserved. This information is not intended as a substitute for professional medical care. Always follow your healthcare professional's instructions.        Understanding Colon and Rectal Polyps    The colon (also called the large intestine) is a muscular tube that forms the last part of the digestive tract. It absorbs water and stores food waste. The colon is about 4 to 6 feet long. The rectum is the last 6 inches of the colon. The colon and rectum have a smooth lining composed of millions of cells. Changes in these cells can lead to growths in the colon that can become cancerous and should be removed. Multiple tests are available to screen for colon cancer, but the colonoscopy is the most recommended test. During colonoscopy, these polyps can be removed. How often you need this test depends on many things including your condition, your family history, symptoms, and what the findings were at the previous colonoscopy.   When the colon lining changes  Changes that happen in the cells that line the colon or rectum can lead to growths called polyps. Over a  period of years, polyps can turn cancerous. Removing polyps early may prevent cancer from ever forming.  Polyps  Polyps are fleshy clumps of tissue that form on the lining of the colon or rectum. Small polyps are usually benign (not cancerous). However, over time, cells in a polyp can change and become cancerous. Certain types of polyps known as adenomatous polyps are premalignant. The risk for invasive cancer increases with the size of the polyp and certain cell and gene features. This means that they can become cancerous if they're not removed. Hyperplastic polyps are benign. They can grow quite large and not turn cancerous.   Cancer  Almost all colorectal cancers start when polyp cells begin growing abnormally. As a cancerous tumor grows, it may involve more and more of the colon or rectum. In time, cancer can also grow beyond the colon or rectum and spread to nearby organs or to glands called lymph nodes. The cells can also travel to other parts of the body. This is known as metastasis. The earlier a cancerous tumor is removed, the better the chance of preventing its spread.    Date Last Reviewed: 8/1/2016  © 5722-4646 [employer]. [address] [place] [address]. All rights reserved. This information is not intended as a substitute for professional medical care. Always follow your healthcare professional's instructions.      Discharge Instructions: After Your Surgery/Procedure  Youve just had surgery. During surgery you were given medicine called anesthesia to keep you relaxed and free of pain. After surgery you may have some pain or nausea. This is common. Here are some tips for feeling better and getting well after surgery.     Stay on schedule with your medication.   Going home  Your doctor or nurse will show you how to take care of yourself when you go home. He or she will also answer your questions. Have an adult family member or friend drive you home.      For your safety we  "recommend these precaution for the first 24 hours after your procedure:  · Do not drive or use heavy equipment.  · Do not make important decisions or sign legal papers.  · Do not drink alcohol.  · Have someone stay with you, if needed. He or she can watch for problems and help keep you safe.  · Your concentration, balance, coordination, and judgement may be impaired for many hours after anesthesia.  Use caution when ambulating or standing up.     · You may feel weak and "washed out" after anesthesia and surgery.      Subtle residual effects of general anesthesia or sedation with regional / local anesthesia can last more than 24 hours.  Rest for the remainder of the day or longer if your Doctor/Surgeon has advised you to do so.  Although you may feel normal within the first 24 hours, your reflexes and mental ability may be impaired without you realizing it.  You may feel dizzy, lightheaded or sleepy for 24 hours or longer.      Be sure to go to all follow-up visits with your doctor. And rest after your surgery for as long as your doctor tells you to.  Coping with pain  If you have pain after surgery, pain medicine will help you feel better. Take it as told, before pain becomes severe. Also, ask your doctor or pharmacist about other ways to control pain. This might be with heat, ice, or relaxation. And follow any other instructions your surgeon or nurse gives you.  Tips for taking pain medicine  To get the best relief possible, remember these points:  · Pain medicines can upset your stomach. Taking them with a little food may help.  · Most pain relievers taken by mouth need at least 20 to 30 minutes to start to work.  · Taking medicine on a schedule can help you remember to take it. Try to time your medicine so that you can take it before starting an activity. This might be before you get dressed, go for a walk, or sit down for dinner.  · Constipation is a common side effect of pain medicines. Call your doctor before " taking any medicines such as laxatives or stool softeners to help ease constipation. Also ask if you should skip any foods. Drinking lots of fluids and eating foods such as fruits and vegetables that are high in fiber can also help. Remember, do not take laxatives unless your surgeon has prescribed them.  · Drinking alcohol and taking pain medicine can cause dizziness and slow your breathing. It can even be deadly. Do not drink alcohol while taking pain medicine.  · Pain medicine can make you react more slowly to things. Do not drive or run machinery while taking pain medicine.  Your health care provider may tell you to take acetaminophen to help ease your pain. Ask him or her how much you are supposed to take each day. Acetaminophen or other pain relievers may interact with your prescription medicines or other over-the-counter (OTC) drugs. Some prescription medicines have acetaminophen and other ingredients. Using both prescription and OTC acetaminophen for pain can cause you to overdose. Read the labels on your OTC medicines with care. This will help you to clearly know the list of ingredients, how much to take, and any warnings. It may also help you not take too much acetaminophen. If you have questions or do not understand the information, ask your pharmacist or health care provider to explain it to you before you take the OTC medicine.  Managing nausea  Some people have an upset stomach after surgery. This is often because of anesthesia, pain, or pain medicine, or the stress of surgery. These tips will help you handle nausea and eat healthy foods as you get better. If you were on a special food plan before surgery, ask your doctor if you should follow it while you get better. These tips may help:  · Do not push yourself to eat. Your body will tell you when to eat and how much.  · Start off with clear liquids and soup. They are easier to digest.  · Next try semi-solid foods, such as mashed potatoes, applesauce,  and gelatin, as you feel ready.  · Slowly move to solid foods. Dont eat fatty, rich, or spicy foods at first.  · Do not force yourself to have 3 large meals a day. Instead eat smaller amounts more often.  · Take pain medicines with a small amount of solid food, such as crackers or toast, to avoid nausea.     Call your surgeon if  · You still have pain an hour after taking medicine. The medicine may not be strong enough.  · You feel too sleepy, dizzy, or groggy. The medicine may be too strong.  · You have side effects like nausea, vomiting, or skin changes, such as rash, itching, or hives.       If you have obstructive sleep apnea  You were given anesthesia medicine during surgery to keep you comfortable and free of pain. After surgery, you may have more apnea spells because of this medicine and other medicines you were given. The spells may last longer than usual.   At home:  · Keep using the continuous positive airway pressure (CPAP) device when you sleep. Unless your health care provider tells you not to, use it when you sleep, day or night. CPAP is a common device used to treat obstructive sleep apnea.  · Talk with your provider before taking any pain medicine, muscle relaxants, or sedatives. Your provider will tell you about the possible dangers of taking these medicines.  © 1724-0309 [employer]. [address] [place] [address], [place] [address]. All rights reserved. This information is not intended as a substitute for professional medical care. Always follow your healthcare professional's instructions.

## 2019-04-12 NOTE — ANESTHESIA POSTPROCEDURE EVALUATION
Anesthesia Post Evaluation    Patient: Karlene Damon    Procedure(s) Performed: Procedure(s) (LRB):  COLONOSCOPY (N/A)    Final Anesthesia Type: general  Patient location during evaluation: PACU  Patient participation: Yes- Able to Participate  Level of consciousness: awake and alert  Post-procedure vital signs: reviewed and stable  Pain management: adequate  Airway patency: patent  PONV status at discharge: No PONV  Anesthetic complications: no      Cardiovascular status: hemodynamically stable  Respiratory status: unassisted and room air  Hydration status: euvolemic  Follow-up not needed.          Vitals Value Taken Time   /69 4/12/2019  2:20 PM   Temp 36.7 °C (98 °F) 4/12/2019  2:00 PM   Pulse 89 4/12/2019  2:20 PM   Resp 12 4/12/2019  2:20 PM   SpO2 99 % 4/12/2019  2:20 PM         Event Time     Out of Recovery 14:20:54          Pain/Shayna Score: Shayna Score: 10 (4/12/2019  2:20 PM)

## 2019-04-12 NOTE — ANESTHESIA PREPROCEDURE EVALUATION
04/12/2019  Karlene Damon is a 53 y.o., female.    Anesthesia Evaluation    I have reviewed the Patient Summary Reports.    I have reviewed the Nursing Notes.   I have reviewed the Medications.     Review of Systems  Anesthesia Hx:  No problems with previous Anesthesia    Social:  Non-Smoker    Hematology/Oncology:  Hematology Normal   Oncology Normal     EENT/Dental:EENT/Dental Normal   Cardiovascular:   Hypertension hyperlipidemia    Pulmonary:  Pulmonary Normal    Renal/:  Renal/ Normal     Hepatic/GI:   Bowel Prep.    Musculoskeletal:  Musculoskeletal Normal    Neurological:  Neurology Normal    Dermatological:  Skin Normal    Psych:  Psychiatric Normal           Physical Exam  General:  Well nourished    Airway/Jaw/Neck:  Airway Findings: Mouth Opening: Normal Tongue: Normal  General Airway Assessment: Adult  Mallampati: I  TM Distance: Normal, at least 6 cm        Eyes/Ears/Nose:  EYES/EARS/NOSE FINDINGS: Normal   Dental:  DENTAL FINDINGS: Normal   Chest/Lungs:  Chest/Lungs Findings: Clear to auscultation, Normal Respiratory Rate     Heart/Vascular:  Heart Findings: Rate: Normal  Rhythm: Regular Rhythm        Mental Status:  Mental Status Findings:  Cooperative, Alert and Oriented         Anesthesia Plan  Type of Anesthesia, risks & benefits discussed:  Anesthesia Type:  general  Patient's Preference:   Intra-op Monitoring Plan: standard ASA monitors  Intra-op Monitoring Plan Comments:   Post Op Pain Control Plan:   Post Op Pain Control Plan Comments:   Induction:   IV  Beta Blocker:  Patient is not currently on a Beta-Blocker (No further documentation required).       Informed Consent: Patient understands risks and agrees with Anesthesia plan.  Questions answered. Anesthesia consent signed with patient.  ASA Score: 2     Day of Surgery Review of History & Physical: I have interviewed and  examined the patient. I have reviewed the patient's H&P dated:  There are no significant changes.  H&P update referred to the provider.         Ready For Surgery From Anesthesia Perspective.

## 2019-04-12 NOTE — TRANSFER OF CARE
"Anesthesia Transfer of Care Note    Patient: Karlene Damon    Procedure(s) Performed: Procedure(s) (LRB):  COLONOSCOPY (N/A)    Patient location: GI    Anesthesia Type: general    Transport from OR: Transported from OR on room air with adequate spontaneous ventilation    Post pain: adequate analgesia    Post assessment: no apparent anesthetic complications and tolerated procedure well    Post vital signs: stable    Level of consciousness: awake, alert and oriented    Nausea/Vomiting: no nausea/vomiting    Complications: none    Transfer of care protocol was followed      Last vitals:   Visit Vitals  /74 (BP Location: Left arm, Patient Position: Lying)   Pulse 105   Temp 36.8 °C (98.2 °F) (Skin)   Resp 16   Ht 5' 1.5" (1.562 m)   Wt 72.6 kg (160 lb)   SpO2 99%   Breastfeeding? No   BMI 29.74 kg/m²     "

## 2019-04-17 ENCOUNTER — PATIENT MESSAGE (OUTPATIENT)
Dept: GASTROENTEROLOGY | Facility: CLINIC | Age: 54
End: 2019-04-17

## 2019-05-28 DIAGNOSIS — L71.9 ROSACEA: ICD-10-CM

## 2019-05-28 RX ORDER — DOXYCYCLINE 40 MG/1
CAPSULE ORAL
Qty: 30 CAPSULE | Refills: 3 | Status: SHIPPED | OUTPATIENT
Start: 2019-05-28 | End: 2019-09-24 | Stop reason: SDUPTHER

## 2019-06-03 ENCOUNTER — OFFICE VISIT (OUTPATIENT)
Dept: FAMILY MEDICINE | Facility: CLINIC | Age: 54
End: 2019-06-03
Payer: COMMERCIAL

## 2019-06-03 VITALS
BODY MASS INDEX: 30.18 KG/M2 | TEMPERATURE: 98 F | HEIGHT: 62 IN | HEART RATE: 97 BPM | SYSTOLIC BLOOD PRESSURE: 121 MMHG | WEIGHT: 164 LBS | DIASTOLIC BLOOD PRESSURE: 83 MMHG

## 2019-06-03 DIAGNOSIS — Z12.39 SCREENING FOR BREAST CANCER: ICD-10-CM

## 2019-06-03 DIAGNOSIS — E78.2 HYPERLIPIDEMIA, MIXED: ICD-10-CM

## 2019-06-03 DIAGNOSIS — I10 ESSENTIAL HYPERTENSION: Primary | ICD-10-CM

## 2019-06-03 DIAGNOSIS — E66.9 OBESITY, CLASS I, BMI 30-34.9: ICD-10-CM

## 2019-06-03 PROBLEM — Z12.11 SCREENING FOR COLON CANCER: Status: RESOLVED | Noted: 2019-04-12 | Resolved: 2019-06-03

## 2019-06-03 PROCEDURE — 3079F DIAST BP 80-89 MM HG: CPT | Mod: CPTII,S$GLB,, | Performed by: NURSE PRACTITIONER

## 2019-06-03 PROCEDURE — 99999 PR PBB SHADOW E&M-EST. PATIENT-LVL IV: CPT | Mod: PBBFAC,,, | Performed by: NURSE PRACTITIONER

## 2019-06-03 PROCEDURE — 99999 PR PBB SHADOW E&M-EST. PATIENT-LVL IV: ICD-10-PCS | Mod: PBBFAC,,, | Performed by: NURSE PRACTITIONER

## 2019-06-03 PROCEDURE — 99214 OFFICE O/P EST MOD 30 MIN: CPT | Mod: S$GLB,,, | Performed by: NURSE PRACTITIONER

## 2019-06-03 PROCEDURE — 3008F PR BODY MASS INDEX (BMI) DOCUMENTED: ICD-10-PCS | Mod: CPTII,S$GLB,, | Performed by: NURSE PRACTITIONER

## 2019-06-03 PROCEDURE — 3074F SYST BP LT 130 MM HG: CPT | Mod: CPTII,S$GLB,, | Performed by: NURSE PRACTITIONER

## 2019-06-03 PROCEDURE — 99214 PR OFFICE/OUTPT VISIT, EST, LEVL IV, 30-39 MIN: ICD-10-PCS | Mod: S$GLB,,, | Performed by: NURSE PRACTITIONER

## 2019-06-03 PROCEDURE — 3074F PR MOST RECENT SYSTOLIC BLOOD PRESSURE < 130 MM HG: ICD-10-PCS | Mod: CPTII,S$GLB,, | Performed by: NURSE PRACTITIONER

## 2019-06-03 PROCEDURE — 3079F PR MOST RECENT DIASTOLIC BLOOD PRESSURE 80-89 MM HG: ICD-10-PCS | Mod: CPTII,S$GLB,, | Performed by: NURSE PRACTITIONER

## 2019-06-03 PROCEDURE — 3008F BODY MASS INDEX DOCD: CPT | Mod: CPTII,S$GLB,, | Performed by: NURSE PRACTITIONER

## 2019-06-03 NOTE — PROGRESS NOTES
Subjective:       Patient ID: Karlene Damon is a 54 y.o. female.    Chief Complaint: Hypertension    Ms. Damon presents to the clinic today for six month follow up for hypertension, hyperlipidemia. She has well controlled BP.  She denies any new complaints.  She is due for a mammogram.    Review of Systems   Constitutional: Negative for activity change and unexpected weight change.   HENT: Negative for hearing loss, rhinorrhea and trouble swallowing.    Eyes: Negative for discharge and visual disturbance.   Respiratory: Negative for chest tightness and wheezing.    Cardiovascular: Negative for chest pain and palpitations.   Gastrointestinal: Negative for blood in stool, constipation, diarrhea and vomiting.   Endocrine: Negative for polydipsia and polyuria.   Genitourinary: Negative for difficulty urinating, dysuria, hematuria and menstrual problem.   Musculoskeletal: Negative for arthralgias, joint swelling and neck pain.   Neurological: Negative for weakness and headaches.   Psychiatric/Behavioral: Negative for confusion and dysphoric mood.       Objective:      Physical Exam   Constitutional: She is oriented to person, place, and time. She appears well-nourished. No distress.   HENT:   Head: Normocephalic and atraumatic.   Right Ear: External ear normal.   Left Ear: External ear normal.   Mouth/Throat: Oropharynx is clear and moist. No oropharyngeal exudate.   Eyes: Pupils are equal, round, and reactive to light. Right eye exhibits no discharge. Left eye exhibits no discharge.   Neck: Neck supple. No thyromegaly present.   Cardiovascular: Normal rate and regular rhythm. Exam reveals no gallop and no friction rub.   No murmur heard.  Pulmonary/Chest: Effort normal and breath sounds normal. No respiratory distress. She has no wheezes. She has no rales.   Abdominal: Soft. She exhibits no distension. There is no tenderness.   Lymphadenopathy:     She has no cervical adenopathy.   Neurological: She is alert  and oriented to person, place, and time. Coordination normal.   Skin: Skin is warm and dry.   Psychiatric: She has a normal mood and affect. Her behavior is normal. Thought content normal.   Vitals reviewed.          Current Outpatient Medications:     amlodipine-benazepril 10-20mg (LOTREL) 10-20 mg per capsule, Take 1 capsule by mouth once daily., Disp: 90 capsule, Rfl: 3    cetirizine (ZYRTEC) 10 MG tablet, Take 10 mg by mouth once daily., Disp: , Rfl:     cyclobenzaprine (FLEXERIL) 10 MG tablet, Take 1 tablet (10 mg total) by mouth nightly as needed., Disp: 30 tablet, Rfl: 6    doxycycline (ORACEA) 40 mg capsule, TAKE 1 CAPSULE BY MOUTH EVERY DAY, Disp: 30 capsule, Rfl: 3    fluticasone (FLONASE) 50 mcg/actuation nasal spray, 1 spray by Each Nare route once daily., Disp: , Rfl:     metronidazole 1% (METROGEL) 1 % Gel, Apply topically once daily., Disp: 60 g, Rfl: 11    omega 3-dha-epa-fish oil (FISH OIL) 1,000 mg (120 mg-180 mg) Cap, Take 1 capsule by mouth once daily., Disp: , Rfl:     PREMARIN 0.625 mg tablet, Take 0.625 mg by mouth once daily. , Disp: , Rfl:     simvastatin (ZOCOR) 10 MG tablet, TAKE ONE TABLET BY MOUTH IN THE EVENING AT BEDTIME ONCE A DAY ORALLY 90 DAYS, Disp: 90 tablet, Rfl: 3    triamterene-hydrochlorothiazide 37.5-25 mg (DYAZIDE) 37.5-25 mg per capsule, Take 1 capsule by mouth daily as needed., Disp: 90 capsule, Rfl: 3    zolpidem (AMBIEN) 10 mg Tab, Take 1 tablet (10 mg total) by mouth every evening., Disp: 30 tablet, Rfl: 5  Assessment:       1. Essential hypertension    2. Obesity, Class I, BMI 30-34.9    3. Hyperlipidemia, mixed    4. Screening for breast cancer        Plan:       Essential hypertension  Stable, continue current medication.  -     Comprehensive metabolic panel; Future; Expected date: 06/03/2019  -     Lipid panel; Future; Expected date: 06/03/2019    Obesity, Class I, BMI 30-34.9  4 lb weight gain    Hyperlipidemia, mixed  Lipid panel    Screening for breast  cancer  -     Mammo Digital Screening Bilat; Future; Expected date: 06/03/2019      Patient readiness: acceptance and barriers:none    During the course of the visit the patient was educated and counseled about the following:     Hypertension:   Medication: no change.  Obesity:   Diet interventions: qualitative changes (increase low-fat,  high-fiber foods).  Regular aerobic exercise program discussed.    Goals: Hypertension: Reduce Blood Pressure and Obesity: Reduce calorie intake and BMI    Did patient meet goals/outcomes: No    The following self management tools provided: declined    Patient Instructions (the written plan) was given to the patient/family.     Time spent with patient: 15 minutes    Barriers to medications present (no )    Adverse reactions to current medications (no)    Over the counter medications reviewed (Yes)    RTC 6 mos

## 2019-06-22 ENCOUNTER — LAB VISIT (OUTPATIENT)
Dept: LAB | Facility: HOSPITAL | Age: 54
End: 2019-06-22
Attending: NURSE PRACTITIONER
Payer: COMMERCIAL

## 2019-06-22 DIAGNOSIS — I10 ESSENTIAL HYPERTENSION: ICD-10-CM

## 2019-06-22 LAB
ALBUMIN SERPL BCP-MCNC: 3.9 G/DL (ref 3.5–5.2)
ALP SERPL-CCNC: 65 U/L (ref 55–135)
ALT SERPL W/O P-5'-P-CCNC: 25 U/L (ref 10–44)
ANION GAP SERPL CALC-SCNC: 13 MMOL/L (ref 8–16)
AST SERPL-CCNC: 27 U/L (ref 10–40)
BILIRUB SERPL-MCNC: 0.4 MG/DL (ref 0.1–1)
BUN SERPL-MCNC: 14 MG/DL (ref 6–20)
CALCIUM SERPL-MCNC: 9.8 MG/DL (ref 8.7–10.5)
CHLORIDE SERPL-SCNC: 104 MMOL/L (ref 95–110)
CHOLEST SERPL-MCNC: 161 MG/DL (ref 120–199)
CHOLEST/HDLC SERPL: 3.6 {RATIO} (ref 2–5)
CO2 SERPL-SCNC: 25 MMOL/L (ref 23–29)
CREAT SERPL-MCNC: 0.8 MG/DL (ref 0.5–1.4)
EST. GFR  (AFRICAN AMERICAN): >60 ML/MIN/1.73 M^2
EST. GFR  (NON AFRICAN AMERICAN): >60 ML/MIN/1.73 M^2
GLUCOSE SERPL-MCNC: 97 MG/DL (ref 70–110)
HDLC SERPL-MCNC: 45 MG/DL (ref 40–75)
HDLC SERPL: 28 % (ref 20–50)
LDLC SERPL CALC-MCNC: 92 MG/DL (ref 63–159)
NONHDLC SERPL-MCNC: 116 MG/DL
POTASSIUM SERPL-SCNC: 4.4 MMOL/L (ref 3.5–5.1)
PROT SERPL-MCNC: 7 G/DL (ref 6–8.4)
SODIUM SERPL-SCNC: 142 MMOL/L (ref 136–145)
TRIGL SERPL-MCNC: 120 MG/DL (ref 30–150)

## 2019-06-22 PROCEDURE — 36415 COLL VENOUS BLD VENIPUNCTURE: CPT | Mod: PO

## 2019-06-22 PROCEDURE — 80061 LIPID PANEL: CPT

## 2019-06-22 PROCEDURE — 80053 COMPREHEN METABOLIC PANEL: CPT

## 2019-07-02 NOTE — TELEPHONE ENCOUNTER
Please advise Sending to on call  LOV 6/3/19  FOV 12/6/19  Pt is asking for 90 day supply please adjust if approved.              ----- Message from Hugh Epstein sent at 7/2/2019  8:04 AM CDT -----  Contact: Patient  Type:  RX Refill Request    Who Called:  Patient  Refill or New Rx:  Refill  RX Name and Strength:    1. zolpidem (AMBIEN) 10 mg Tab  2. cyclobenzaprine (FLEXERIL) 10 MG tablet  How is the patient currently taking it? (ex. 1XDay):  As ordered  Is this a 30 day or 90 day RX:  90 if possible  Preferred Pharmacy with phone number:    [facility]/pharmacy #[id number] - [place], [place] - [address]  [address]  [place] [address]  Phone: [phone] Fax: [phone]  Local or Mail Order:  Local  Ordering Provider:  Dr. Obando  Best Call Back Number:  [phone]  Additional Information:  Patient has yet to re-establish care but will be in December. Please call to verify. Thanks!

## 2019-07-03 ENCOUNTER — PATIENT MESSAGE (OUTPATIENT)
Dept: FAMILY MEDICINE | Facility: CLINIC | Age: 54
End: 2019-07-03

## 2019-07-03 RX ORDER — CYCLOBENZAPRINE HCL 10 MG
TABLET ORAL
Qty: 30 TABLET | Refills: 0 | Status: SHIPPED | OUTPATIENT
Start: 2019-07-03 | End: 2019-09-27 | Stop reason: SDUPTHER

## 2019-07-03 RX ORDER — CYCLOBENZAPRINE HCL 10 MG
10 TABLET ORAL NIGHTLY PRN
Qty: 30 TABLET | Refills: 0 | Status: SHIPPED | OUTPATIENT
Start: 2019-07-03 | End: 2019-08-22 | Stop reason: SDUPTHER

## 2019-07-03 RX ORDER — ZOLPIDEM TARTRATE 10 MG/1
TABLET ORAL
Qty: 30 TABLET | Refills: 0 | Status: SHIPPED | OUTPATIENT
Start: 2019-07-03 | End: 2019-09-23

## 2019-07-03 RX ORDER — ZOLPIDEM TARTRATE 10 MG/1
10 TABLET ORAL NIGHTLY
Qty: 30 TABLET | Refills: 0 | Status: SHIPPED | OUTPATIENT
Start: 2019-07-03 | End: 2019-09-23

## 2019-07-05 NOTE — TELEPHONE ENCOUNTER
Called patient regarding Rx refill and follow up appointment, no answer left detail voice message for patient to call back to scheduled follow up appointment, prior to any additional refills.

## 2019-07-26 ENCOUNTER — HOSPITAL ENCOUNTER (OUTPATIENT)
Dept: RADIOLOGY | Facility: CLINIC | Age: 54
Discharge: HOME | End: 2019-07-26
Attending: NURSE PRACTITIONER
Payer: COMMERCIAL

## 2019-07-26 DIAGNOSIS — Z12.39 SCREENING FOR BREAST CANCER: ICD-10-CM

## 2019-07-26 PROCEDURE — 77067 SCR MAMMO BI INCL CAD: CPT | Mod: TC,PO

## 2019-07-26 PROCEDURE — 77067 SCR MAMMO BI INCL CAD: CPT | Mod: 26,,, | Performed by: RADIOLOGY

## 2019-07-26 PROCEDURE — 77067 MAMMO DIGITAL SCREENING BILAT WITH CAD: ICD-10-PCS | Mod: 26,,, | Performed by: RADIOLOGY

## 2019-08-23 RX ORDER — CYCLOBENZAPRINE HCL 10 MG
TABLET ORAL
Qty: 30 TABLET | Refills: 0 | Status: SHIPPED | OUTPATIENT
Start: 2019-08-23 | End: 2019-09-27 | Stop reason: SDUPTHER

## 2019-09-10 RX ORDER — ZOLPIDEM TARTRATE 10 MG/1
TABLET ORAL
Qty: 30 TABLET | Refills: 0 | OUTPATIENT
Start: 2019-09-10

## 2019-09-11 RX ORDER — ZOLPIDEM TARTRATE 10 MG/1
10 TABLET ORAL NIGHTLY
Qty: 30 TABLET | Refills: 0 | OUTPATIENT
Start: 2019-09-11

## 2019-09-12 ENCOUNTER — PATIENT MESSAGE (OUTPATIENT)
Dept: FAMILY MEDICINE | Facility: CLINIC | Age: 54
End: 2019-09-12

## 2019-09-13 ENCOUNTER — TELEPHONE (OUTPATIENT)
Dept: FAMILY MEDICINE | Facility: CLINIC | Age: 54
End: 2019-09-13

## 2019-09-13 NOTE — TELEPHONE ENCOUNTER
----- Message from Gabriela Chow sent at 9/13/2019  2:40 PM CDT -----  Type:  Patient Returning Call    Who Called:  self  Who Left Message for Patient:  Sydnie   Does the patient know what this is regarding?:  medication  Best Call Back Number:   [phone] (home)     Additional Information:

## 2019-09-13 NOTE — TELEPHONE ENCOUNTER
This patient will be seeing you in December to establish care, but wanted to know if she needed a sooner appt to get a refill of her medication Zolpidem.

## 2019-09-16 ENCOUNTER — TELEPHONE (OUTPATIENT)
Dept: FAMILY MEDICINE | Facility: CLINIC | Age: 54
End: 2019-09-16

## 2019-09-17 ENCOUNTER — OFFICE VISIT (OUTPATIENT)
Dept: FAMILY MEDICINE | Facility: CLINIC | Age: 54
End: 2019-09-17
Payer: COMMERCIAL

## 2019-09-17 VITALS
SYSTOLIC BLOOD PRESSURE: 120 MMHG | HEART RATE: 99 BPM | DIASTOLIC BLOOD PRESSURE: 72 MMHG | TEMPERATURE: 98 F | WEIGHT: 167.31 LBS | HEIGHT: 62 IN | OXYGEN SATURATION: 99 % | BODY MASS INDEX: 30.79 KG/M2

## 2019-09-17 DIAGNOSIS — Z79.899 CONTROLLED SUBSTANCE AGREEMENT SIGNED: Primary | ICD-10-CM

## 2019-09-17 DIAGNOSIS — G47.00 INSOMNIA, UNSPECIFIED TYPE: ICD-10-CM

## 2019-09-17 DIAGNOSIS — Z23 IMMUNIZATION DUE: ICD-10-CM

## 2019-09-17 PROCEDURE — 99999 PR PBB SHADOW E&M-EST. PATIENT-LVL III: ICD-10-PCS | Mod: PBBFAC,,, | Performed by: FAMILY MEDICINE

## 2019-09-17 PROCEDURE — 3078F DIAST BP <80 MM HG: CPT | Mod: CPTII,S$GLB,, | Performed by: FAMILY MEDICINE

## 2019-09-17 PROCEDURE — 3008F BODY MASS INDEX DOCD: CPT | Mod: CPTII,S$GLB,, | Performed by: FAMILY MEDICINE

## 2019-09-17 PROCEDURE — 90471 ZOSTER RECOMBINANT VACCINE: ICD-10-PCS | Mod: S$GLB,,, | Performed by: FAMILY MEDICINE

## 2019-09-17 PROCEDURE — 90750 ZOSTER RECOMBINANT VACCINE: ICD-10-PCS | Mod: S$GLB,,, | Performed by: FAMILY MEDICINE

## 2019-09-17 PROCEDURE — 80307 DRUG TEST PRSMV CHEM ANLYZR: CPT

## 2019-09-17 PROCEDURE — 90471 IMMUNIZATION ADMIN: CPT | Mod: S$GLB,,, | Performed by: FAMILY MEDICINE

## 2019-09-17 PROCEDURE — 99214 OFFICE O/P EST MOD 30 MIN: CPT | Mod: 25,S$GLB,, | Performed by: FAMILY MEDICINE

## 2019-09-17 PROCEDURE — 3008F PR BODY MASS INDEX (BMI) DOCUMENTED: ICD-10-PCS | Mod: CPTII,S$GLB,, | Performed by: FAMILY MEDICINE

## 2019-09-17 PROCEDURE — 3074F SYST BP LT 130 MM HG: CPT | Mod: CPTII,S$GLB,, | Performed by: FAMILY MEDICINE

## 2019-09-17 PROCEDURE — 3078F PR MOST RECENT DIASTOLIC BLOOD PRESSURE < 80 MM HG: ICD-10-PCS | Mod: CPTII,S$GLB,, | Performed by: FAMILY MEDICINE

## 2019-09-17 PROCEDURE — 3074F PR MOST RECENT SYSTOLIC BLOOD PRESSURE < 130 MM HG: ICD-10-PCS | Mod: CPTII,S$GLB,, | Performed by: FAMILY MEDICINE

## 2019-09-17 PROCEDURE — 99214 PR OFFICE/OUTPT VISIT, EST, LEVL IV, 30-39 MIN: ICD-10-PCS | Mod: 25,S$GLB,, | Performed by: FAMILY MEDICINE

## 2019-09-17 PROCEDURE — 99999 PR PBB SHADOW E&M-EST. PATIENT-LVL III: CPT | Mod: PBBFAC,,, | Performed by: FAMILY MEDICINE

## 2019-09-17 PROCEDURE — 90750 HZV VACC RECOMBINANT IM: CPT | Mod: S$GLB,,, | Performed by: FAMILY MEDICINE

## 2019-09-17 NOTE — PROGRESS NOTES
Subjective:       Patient ID: Karlene Damon is a 54 y.o. female.    Chief Complaint: Medication Refill    HPI     Mrs. Damon.  Presents to clinic to get enrolled in the controlled substance program.  Currently taking Ambien.  Has tried melatonin and trazodone in the past.  Neither of those worked.       Past Medical History:   Diagnosis Date    Back spasm 6/7/2018    High cholesterol     Hormone replacement therapy (HRT) 6/7/2018    HTN (hypertension) 6/7/2018    Hyperlipidemia, mixed 6/7/2018    Hypertension     Insomnia 6/7/2018    Rosacea 6/7/2018       Past Surgical History:   Procedure Laterality Date    APPENDECTOMY      ARTHROSCOPY-KNEE Right 12/12/2017    [facility]    ARTHROSCOPY-KNEE W/ CHONDROPLASTY  12/12/2017    [facility]    BREAST BIOPSY Right 15 yrs ago    benign    BREAST SURGERY Left     benign cyst removal x2    COLONOSCOPY N/A 4/12/2019    [facility] ENDO    GALLBLADDER SURGERY      HYSTERECTOMY      nephrolithiasis      PLANTAR FASCIA SURGERY Left     right knee arthroscopy      SHOULDER SURGERY Right     arthroscopy    TOTAL ABDOMINAL HYSTERECTOMY W/ BILATERAL SALPINGOOPHORECTOMY      ovarian cysts       Family History   Problem Relation Age of Onset    Hypertension Mother     Hypertension Father     Stroke Father 51    Diabetes type II Father     Polycystic kidney disease Brother     Bone cancer Maternal Aunt     Breast cancer Maternal Aunt     Melanoma Neg Hx     Psoriasis Neg Hx     Lupus Neg Hx     Eczema Neg Hx        Social History     Tobacco Use    Smoking status: Never Smoker    Smokeless tobacco: Never Used   Substance Use Topics    Alcohol use: Yes     Frequency: Monthly or less     Drinks per session: 1 or 2     Binge frequency: Never     Comment: rare    Drug use: No       Social History     Substance and Sexual Activity   Sexual  Activity Not Currently          Current Outpatient Medications:     amlodipine-benazepril 10-20mg (LOTREL) 10-20 mg per capsule, Take 1 capsule by mouth once daily., Disp: 90 capsule, Rfl: 3    cetirizine (ZYRTEC) 10 MG tablet, Take 10 mg by mouth once daily., Disp: , Rfl:     cyclobenzaprine (FLEXERIL) 10 MG tablet, TAKE 1 TABLET BY MOUTH EVERY DAY IN THE EVENING AS NEEDED, Disp: 30 tablet, Rfl: 0    cyclobenzaprine (FLEXERIL) 10 MG tablet, TAKE 1 TABLET BY MOUTH EVERY DAY IN THE EVENING AS NEEDED, Disp: 30 tablet, Rfl: 0    doxycycline (ORACEA) 40 mg capsule, TAKE 1 CAPSULE BY MOUTH EVERY DAY, Disp: 30 capsule, Rfl: 3    fluticasone (FLONASE) 50 mcg/actuation nasal spray, 1 spray by Each Nare route once daily., Disp: , Rfl:     metronidazole 1% (METROGEL) 1 % Gel, Apply topically once daily., Disp: 60 g, Rfl: 11    omega 3-dha-epa-fish oil (FISH OIL) 1,000 mg (120 mg-180 mg) Cap, Take 1 capsule by mouth once daily., Disp: , Rfl:     PREMARIN 0.625 mg tablet, Take 0.625 mg by mouth once daily. , Disp: , Rfl:     simvastatin (ZOCOR) 10 MG tablet, TAKE ONE TABLET BY MOUTH IN THE EVENING AT BEDTIME ONCE A DAY ORALLY 90 DAYS, Disp: 90 tablet, Rfl: 3    triamterene-hydrochlorothiazide 37.5-25 mg (DYAZIDE) 37.5-25 mg per capsule, Take 1 capsule by mouth daily as needed., Disp: 90 capsule, Rfl: 3    zolpidem (AMBIEN) 10 mg Tab, TAKE 1 TABLET BY MOUTH EVERY DAY IN THE EVENING, Disp: 30 tablet, Rfl: 0    zolpidem (AMBIEN) 10 mg Tab, Take 1 tablet (10 mg total) by mouth every evening., Disp: 30 tablet, Rfl: 0     Review of patient's allergies indicates:   Allergen Reactions    Hydrocodone Hives    Ciprofloxacin Hives    Tetanus vaccines and toxoid Hives and Swelling     Hives and arm was very swollen--as an adult            Review of Systems   Constitutional: Negative for activity change and unexpected weight change.   HENT: Negative for hearing loss and trouble swallowing.    Eyes: Negative for  "discharge and visual disturbance.   Respiratory: Negative for chest tightness and wheezing.    Cardiovascular: Negative for chest pain and palpitations.   Gastrointestinal: Negative for blood in stool, constipation, diarrhea and vomiting.   Endocrine: Negative for polydipsia and polyuria.   Genitourinary: Negative for difficulty urinating, dysuria, hematuria and menstrual problem.   Musculoskeletal: Negative for arthralgias, joint swelling and neck pain.   Neurological: Negative for weakness and headaches.   Psychiatric/Behavioral: Negative for confusion and dysphoric mood.           Objective:          Vitals:    09/17/19 1605   BP: 120/72   Pulse: 99   Temp: 97.9 °F (36.6 °C)   SpO2: 99%   Weight: 75.9 kg (167 lb 5.3 oz)   Height: 5' 1.5" (1.562 m)       Physical Exam   Constitutional: She appears well-developed and well-nourished. She is cooperative. No distress.   HENT:   Head: Normocephalic and atraumatic.   Right Ear: Hearing normal.   Left Ear: Hearing normal.   Nose: Nose normal.   Eyes: Conjunctivae, EOM and lids are normal.   Cardiovascular: Normal rate, regular rhythm, normal heart sounds and normal pulses.   Pulmonary/Chest: Effort normal and breath sounds normal.   Abdominal: Soft. Normal appearance and bowel sounds are normal. There is no tenderness.   Musculoskeletal: Normal range of motion.   Neurological: She is alert.   Skin: Skin is warm. Capillary refill takes less than 2 seconds. No rash noted. No cyanosis.   Psychiatric: She has a normal mood and affect. Her speech is normal and behavior is normal. Cognition and memory are normal.   Vitals reviewed.              Assessment/Plan     Karlene was seen today for medication refill.    Diagnoses and all orders for this visit:    Controlled substance agreement signed  -     Cancel: POCT Urine Drug Screen Pain Management  -     Pain Clinic Drug Screen    Insomnia, unspecified type  -     zolpidem (AMBIEN) 10 mg Tab; Take 1 tablet (10 mg total) by mouth " every evening.    Immunization due  -     (In Office Administered) Zoster Recombinant Vaccine          Follow up in about 6 months (around 3/17/2020) for control substance .    Future Appointments   Date Time Provider Department Center   10/25/2019  2:40 PM Jess Lr MD [facility] [place]   3/17/2020  4:20 PM Jess Lr MD [facility] [place] L[place] MD  [facility]

## 2019-09-20 ENCOUNTER — PATIENT MESSAGE (OUTPATIENT)
Dept: FAMILY MEDICINE | Facility: CLINIC | Age: 54
End: 2019-09-20

## 2019-09-20 LAB
6MAM UR QL: NOT DETECTED
7AMINOCLONAZEPAM UR QL: NOT DETECTED
A-OH ALPRAZ UR QL: NOT DETECTED
ALPRAZ UR QL: NOT DETECTED
AMPHET UR QL SCN: NOT DETECTED
BARBITURATES UR QL: NOT DETECTED
BUPRENORPHINE UR QL: NOT DETECTED
BZE UR QL: NOT DETECTED
CARBOXYTHC UR QL: NOT DETECTED
CARISOPRODOL UR QL: NOT DETECTED
CLONAZEPAM UR QL: NOT DETECTED
CODEINE UR QL: NOT DETECTED
CREAT UR-MCNC: 127.8 MG/DL (ref 20–400)
DIAZEPAM UR QL: NOT DETECTED
ETHYL GLUCURONIDE UR QL: NOT DETECTED
FENTANYL UR QL: NOT DETECTED
HYDROCODONE UR QL: NOT DETECTED
HYDROMORPHONE UR QL: NOT DETECTED
LORAZEPAM UR QL: NOT DETECTED
MDA UR QL: NOT DETECTED
MDEA UR QL: NOT DETECTED
MDMA UR QL: NOT DETECTED
ME-PHENIDATE UR QL: NOT DETECTED
MEPERIDINE UR QL: NOT DETECTED
METHADONE UR QL: NOT DETECTED
METHAMPHET UR QL: NOT DETECTED
MIDAZOLAM UR QL SCN: NOT DETECTED
MORPHINE UR QL: NOT DETECTED
NORBUPRENORPHINE UR QL CFM: NOT DETECTED
NORDIAZEPAM UR QL: NOT DETECTED
NORFENTANYL UR QL: NOT DETECTED
NORHYDROCODONE UR QL CFM: NOT DETECTED
NOROXYCODONE UR QL CFM: NOT DETECTED
NOROXYMORPHONE: NOT DETECTED
OXAZEPAM UR QL: NOT DETECTED
OXYCODONE UR QL: NOT DETECTED
OXYMORPHONE UR QL: NOT DETECTED
PATHOLOGY STUDY: NORMAL
PCP UR QL: NOT DETECTED
PHENTERMINE UR QL: NOT DETECTED
PROPOXYPH UR QL: NOT DETECTED
SERVICE CMNT-IMP: NORMAL
TAPENTADOL UR QL SCN: NOT DETECTED
TAPENTADOL-O-SULF: NOT DETECTED
TEMAZEPAM UR QL: NOT DETECTED
TRAMADOL UR QL: NOT DETECTED
ZOLPIDEM UR QL: PRESENT

## 2019-09-23 ENCOUNTER — PATIENT MESSAGE (OUTPATIENT)
Dept: FAMILY MEDICINE | Facility: CLINIC | Age: 54
End: 2019-09-23

## 2019-09-23 RX ORDER — ZOLPIDEM TARTRATE 10 MG/1
10 TABLET ORAL NIGHTLY
Qty: 90 TABLET | Refills: 1 | Status: SHIPPED | OUTPATIENT
Start: 2019-09-23 | End: 2019-10-25

## 2019-09-24 DIAGNOSIS — L71.9 ROSACEA: ICD-10-CM

## 2019-09-24 RX ORDER — DOXYCYCLINE 40 MG/1
CAPSULE ORAL
Qty: 90 CAPSULE | Refills: 1 | Status: SHIPPED | OUTPATIENT
Start: 2019-09-24 | End: 2020-03-11

## 2019-09-27 RX ORDER — CYCLOBENZAPRINE HCL 10 MG
10 TABLET ORAL NIGHTLY
Qty: 30 TABLET | Refills: 0 | Status: SHIPPED | OUTPATIENT
Start: 2019-09-27 | End: 2019-10-21 | Stop reason: SDUPTHER

## 2019-09-27 RX ORDER — CYCLOBENZAPRINE HCL 10 MG
TABLET ORAL
Qty: 30 TABLET | Refills: 0 | OUTPATIENT
Start: 2019-09-27

## 2019-10-23 RX ORDER — CYCLOBENZAPRINE HCL 10 MG
TABLET ORAL
Qty: 30 TABLET | Refills: 0 | Status: SHIPPED | OUTPATIENT
Start: 2019-10-23 | End: 2019-10-25

## 2019-10-25 ENCOUNTER — OFFICE VISIT (OUTPATIENT)
Dept: FAMILY MEDICINE | Facility: CLINIC | Age: 54
End: 2019-10-25
Payer: COMMERCIAL

## 2019-10-25 VITALS
SYSTOLIC BLOOD PRESSURE: 128 MMHG | DIASTOLIC BLOOD PRESSURE: 72 MMHG | BODY MASS INDEX: 30.51 KG/M2 | OXYGEN SATURATION: 97 % | TEMPERATURE: 98 F | HEIGHT: 62 IN | HEART RATE: 101 BPM | WEIGHT: 165.81 LBS

## 2019-10-25 DIAGNOSIS — L91.8 SKIN TAG: ICD-10-CM

## 2019-10-25 PROCEDURE — 99214 OFFICE O/P EST MOD 30 MIN: CPT | Mod: 25,S$GLB,, | Performed by: FAMILY MEDICINE

## 2019-10-25 PROCEDURE — 3074F SYST BP LT 130 MM HG: CPT | Mod: CPTII,S$GLB,, | Performed by: FAMILY MEDICINE

## 2019-10-25 PROCEDURE — 3074F PR MOST RECENT SYSTOLIC BLOOD PRESSURE < 130 MM HG: ICD-10-PCS | Mod: CPTII,S$GLB,, | Performed by: FAMILY MEDICINE

## 2019-10-25 PROCEDURE — 3008F PR BODY MASS INDEX (BMI) DOCUMENTED: ICD-10-PCS | Mod: CPTII,S$GLB,, | Performed by: FAMILY MEDICINE

## 2019-10-25 PROCEDURE — 3008F BODY MASS INDEX DOCD: CPT | Mod: CPTII,S$GLB,, | Performed by: FAMILY MEDICINE

## 2019-10-25 PROCEDURE — 11200 PR REMOVAL OF SKIN TAGS, UP TO 15: ICD-10-PCS | Mod: S$GLB,,, | Performed by: FAMILY MEDICINE

## 2019-10-25 PROCEDURE — 11200 RMVL SKIN TAGS UP TO&INC 15: CPT | Mod: S$GLB,,, | Performed by: FAMILY MEDICINE

## 2019-10-25 PROCEDURE — 99214 PR OFFICE/OUTPT VISIT, EST, LEVL IV, 30-39 MIN: ICD-10-PCS | Mod: 25,S$GLB,, | Performed by: FAMILY MEDICINE

## 2019-10-25 PROCEDURE — 99999 PR PBB SHADOW E&M-EST. PATIENT-LVL III: CPT | Mod: PBBFAC,,, | Performed by: FAMILY MEDICINE

## 2019-10-25 PROCEDURE — 99999 PR PBB SHADOW E&M-EST. PATIENT-LVL III: ICD-10-PCS | Mod: PBBFAC,,, | Performed by: FAMILY MEDICINE

## 2019-10-25 PROCEDURE — 3078F PR MOST RECENT DIASTOLIC BLOOD PRESSURE < 80 MM HG: ICD-10-PCS | Mod: CPTII,S$GLB,, | Performed by: FAMILY MEDICINE

## 2019-10-25 PROCEDURE — 3078F DIAST BP <80 MM HG: CPT | Mod: CPTII,S$GLB,, | Performed by: FAMILY MEDICINE

## 2019-10-25 RX ORDER — SIMVASTATIN 10 MG/1
TABLET, FILM COATED ORAL
COMMUNITY
Start: 2019-07-09 | End: 2020-01-02 | Stop reason: SDUPTHER

## 2019-10-25 RX ORDER — ZOLPIDEM TARTRATE 10 MG/1
TABLET ORAL
COMMUNITY
Start: 2019-08-05 | End: 2020-03-17 | Stop reason: SDUPTHER

## 2019-10-25 RX ORDER — AMLODIPINE AND BENAZEPRIL HYDROCHLORIDE 10; 20 MG/1; MG/1
CAPSULE ORAL
COMMUNITY
Start: 2019-08-11 | End: 2019-11-12 | Stop reason: SDUPTHER

## 2019-10-25 RX ORDER — CYCLOBENZAPRINE HCL 10 MG
TABLET ORAL
COMMUNITY
Start: 2019-07-03 | End: 2019-10-27 | Stop reason: SDUPTHER

## 2019-10-27 RX ORDER — CYCLOBENZAPRINE HCL 10 MG
TABLET ORAL
Qty: 90 TABLET | Refills: 1 | Status: SHIPPED | OUTPATIENT
Start: 2019-10-27 | End: 2020-05-05

## 2019-11-11 ENCOUNTER — PATIENT MESSAGE (OUTPATIENT)
Dept: FAMILY MEDICINE | Facility: CLINIC | Age: 54
End: 2019-11-11

## 2019-11-11 NOTE — PROGRESS NOTES
Subjective:       Patient ID: Karlene Damon is a 54 y.o. female.    Chief Complaint: Follow-up    HPI     Presents to clinic for skin tag removal. Has no other complaints.       Past Medical History:   Diagnosis Date    Back spasm 6/7/2018    High cholesterol     Hormone replacement therapy (HRT) 6/7/2018    HTN (hypertension) 6/7/2018    Hyperlipidemia, mixed 6/7/2018    Hypertension     Insomnia 6/7/2018    Rosacea 6/7/2018       Past Surgical History:   Procedure Laterality Date    APPENDECTOMY      BREAST BIOPSY Right 15 yrs ago    benign    BREAST SURGERY Left     benign cyst removal x2    COLONOSCOPY N/A 4/12/2019    Procedure: COLONOSCOPY;  Surgeon: Dennis Rosario MD;  Location[facility];  Service: Endoscopy;  Laterality: N/A;    GALLBLADDER SURGERY      HYSTERECTOMY      nephrolithiasis      PLANTAR FASCIA SURGERY Left     right knee arthroscopy      SHOULDER SURGERY Right     arthroscopy    TOTAL ABDOMINAL HYSTERECTOMY W/ BILATERAL SALPINGOOPHORECTOMY      ovarian cysts       Family History   Problem Relation Age of Onset    Hypertension Mother     Hypertension Father     Stroke Father 51    Diabetes type II Father     Polycystic kidney disease Brother     Bone cancer Maternal Aunt     Breast cancer Maternal Aunt     Melanoma Neg Hx     Psoriasis Neg Hx     Lupus Neg Hx     Eczema Neg Hx        Social History     Tobacco Use    Smoking status: Never Smoker    Smokeless tobacco: Never Used   Substance Use Topics    Alcohol use: Yes     Frequency: Monthly or less     Drinks per session: 1 or 2     Binge frequency: Never     Comment: rare    Drug use: No       Social History     Substance and Sexual Activity   Sexual Activity Not Currently          Current Outpatient Medications:     amlodipine-benazepril 10-20mg (LOTREL) 10-20 mg per capsule, Take by mouth., Disp: , Rfl:     cetirizine (ZYRTEC) 10 MG tablet, Take 10 mg by mouth once daily., Disp: , Rfl:      doxycycline (ORACEA) 40 mg capsule, TAKE 1 CAPSULE BY MOUTH EVERY DAY, Disp: 90 capsule, Rfl: 1    fluticasone (FLONASE) 50 mcg/actuation nasal spray, 1 spray by Each Nare route once daily., Disp: , Rfl:     metronidazole 1% (METROGEL) 1 % Gel, Apply topically once daily., Disp: 60 g, Rfl: 11    omega 3-dha-epa-fish oil (FISH OIL) 1,000 mg (120 mg-180 mg) Cap, Take 1 capsule by mouth once daily., Disp: , Rfl:     PREMARIN 0.625 mg tablet, Take 0.625 mg by mouth once daily. , Disp: , Rfl:     simvastatin (ZOCOR) 10 MG tablet, TAKE ONE TABLET BY MOUTH IN THE EVENING AT BEDTIME ONCE A DAY, Disp: , Rfl:     triamterene-hydrochlorothiazide 37.5-25 mg (DYAZIDE) 37.5-25 mg per capsule, Take 1 capsule by mouth daily as needed., Disp: 90 capsule, Rfl: 3    zolpidem (AMBIEN) 10 mg Tab, 1 (one) time each day in the evening., Disp: , Rfl:     cyclobenzaprine (FLEXERIL) 10 MG tablet, TAKE 1 TABLET BY MOUTH EVERY DAY IN THE EVENING, Disp: 90 tablet, Rfl: 1     Review of patient's allergies indicates:   Allergen Reactions    Hydrocodone Hives    Ciprofloxacin Hives    Tetanus vaccines and toxoid Hives and Swelling     Hives and arm was very swollen--as an adult            Review of Systems   Constitutional: Negative for activity change and unexpected weight change.   HENT: Negative for hearing loss, rhinorrhea and trouble swallowing.    Eyes: Negative for discharge and visual disturbance.   Respiratory: Negative for chest tightness and wheezing.    Cardiovascular: Negative for chest pain and palpitations.   Gastrointestinal: Negative for blood in stool, constipation, diarrhea and vomiting.   Endocrine: Negative for polydipsia and polyuria.   Genitourinary: Negative for difficulty urinating, dysuria, hematuria and menstrual problem.   Musculoskeletal: Negative for arthralgias, joint swelling and neck pain.   Neurological: Negative for weakness and headaches.   Psychiatric/Behavioral: Negative for confusion and  "dysphoric mood.           Objective:          Vitals:    10/25/19 1431   BP: 128/72   Pulse: 101   Temp: 98.1 °F (36.7 °C)   SpO2: 97%   Weight: 75.2 kg (165 lb 12.6 oz)   Height: 5' 1.5" (1.562 m)       Physical Exam   Constitutional: She appears well-developed and well-nourished.   HENT:   Head: Normocephalic and atraumatic.   Cardiovascular: Normal rate and regular rhythm.   Pulmonary/Chest: Effort normal and breath sounds normal.   Neurological: She is alert.   Skin: Skin is warm.   Psychiatric: She has a normal mood and affect. Her behavior is normal.   Vitals reviewed.              Assessment/Plan     Karlene was seen today for follow-up.    Diagnoses and all orders for this visit:    Skin tag  -  Skin tag removal today          Future Appointments   Date Time Provider Department Center   3/17/2020  4:20 PM Jess Lr MD [facility] [place]         Jess Lr MD  [facility]     "

## 2019-11-11 NOTE — PROCEDURES
"Skin tag Removal  Date/Time: 10/25/2019 2:40 PM  Performed by: Jess Lr MD  Authorized by: Jess Lr MD     Consent Done?:  Yes (Written)  Time out: Immediately prior to procedure a "time out" was called to verify the correct patient, procedure, equipment, support staff and site/side marked as required.      Indications:  Skin tag    Location(s):  Head/Neck:  Neck    Trunk:  Chest and abdomen      Anesthesia:  Local infiltration  Local anesthetic:  Topical anesthetic   Patient tolerated procedure well. Told to keep area clean.      "

## 2019-11-12 RX ORDER — AMLODIPINE AND BENAZEPRIL HYDROCHLORIDE 10; 20 MG/1; MG/1
1 CAPSULE ORAL DAILY
Qty: 90 CAPSULE | Refills: 1 | Status: SHIPPED | OUTPATIENT
Start: 2019-11-12 | End: 2020-05-05

## 2019-11-25 ENCOUNTER — OFFICE VISIT (OUTPATIENT)
Dept: ORTHOPEDICS | Facility: CLINIC | Age: 54
End: 2019-11-25
Payer: COMMERCIAL

## 2019-11-25 VITALS
BODY MASS INDEX: 30.36 KG/M2 | DIASTOLIC BLOOD PRESSURE: 90 MMHG | HEIGHT: 62 IN | HEART RATE: 107 BPM | SYSTOLIC BLOOD PRESSURE: 128 MMHG | WEIGHT: 165 LBS

## 2019-11-25 DIAGNOSIS — S83.281A ACUTE LATERAL MENISCUS TEAR OF RIGHT KNEE, INITIAL ENCOUNTER: Primary | ICD-10-CM

## 2019-11-25 DIAGNOSIS — Z98.890 HISTORY OF ARTHROSCOPY OF RIGHT KNEE: ICD-10-CM

## 2019-11-25 PROCEDURE — 3008F PR BODY MASS INDEX (BMI) DOCUMENTED: ICD-10-PCS | Mod: S$GLB,,, | Performed by: ORTHOPAEDIC SURGERY

## 2019-11-25 PROCEDURE — 3080F PR MOST RECENT DIASTOLIC BLOOD PRESSURE >= 90 MM HG: ICD-10-PCS | Mod: S$GLB,,, | Performed by: ORTHOPAEDIC SURGERY

## 2019-11-25 PROCEDURE — 3080F DIAST BP >= 90 MM HG: CPT | Mod: S$GLB,,, | Performed by: ORTHOPAEDIC SURGERY

## 2019-11-25 PROCEDURE — 99213 OFFICE O/P EST LOW 20 MIN: CPT | Mod: 25,S$GLB,, | Performed by: ORTHOPAEDIC SURGERY

## 2019-11-25 PROCEDURE — 3008F BODY MASS INDEX DOCD: CPT | Mod: S$GLB,,, | Performed by: ORTHOPAEDIC SURGERY

## 2019-11-25 PROCEDURE — 99213 PR OFFICE/OUTPT VISIT, EST, LEVL III, 20-29 MIN: ICD-10-PCS | Mod: 25,S$GLB,, | Performed by: ORTHOPAEDIC SURGERY

## 2019-11-25 PROCEDURE — 3074F SYST BP LT 130 MM HG: CPT | Mod: S$GLB,,, | Performed by: ORTHOPAEDIC SURGERY

## 2019-11-25 PROCEDURE — 3074F PR MOST RECENT SYSTOLIC BLOOD PRESSURE < 130 MM HG: ICD-10-PCS | Mod: S$GLB,,, | Performed by: ORTHOPAEDIC SURGERY

## 2019-11-25 NOTE — PROGRESS NOTES
[facility]    Subjective:     Chief Complaint:   Chief Complaint   Patient presents with    Right Knee - Pain     Right knee pain x few months. States that she was getting out of the car and the wind caught the door and  Hit her knee. Pain is worse towards the end of the day and locks up on her.        Past Medical History:   Diagnosis Date    Back spasm 6/7/2018    High cholesterol     Hormone replacement therapy (HRT) 6/7/2018    HTN (hypertension) 6/7/2018    Hyperlipidemia, mixed 6/7/2018    Hypertension     Insomnia 6/7/2018    Rosacea 6/7/2018       Past Surgical History:   Procedure Laterality Date    APPENDECTOMY      BREAST BIOPSY Right 15 yrs ago    benign    BREAST SURGERY Left     benign cyst removal x2    COLONOSCOPY N/A 4/12/2019    Procedure: COLONOSCOPY;  Surgeon: Dennis Rosario MD;  [facility];  Service: Endoscopy;  Laterality: N/A;    GALLBLADDER SURGERY      HYSTERECTOMY      nephrolithiasis      PLANTAR FASCIA SURGERY Left     right knee arthroscopy      SHOULDER SURGERY Right     arthroscopy    TOTAL ABDOMINAL HYSTERECTOMY W/ BILATERAL SALPINGOOPHORECTOMY      ovarian cysts       Current Outpatient Medications   Medication Sig    amlodipine-benazepril 10-20mg (LOTREL) 10-20 mg per capsule Take 1 capsule by mouth once daily.    cetirizine (ZYRTEC) 10 MG tablet Take 10 mg by mouth once daily.    cyclobenzaprine (FLEXERIL) 10 MG tablet TAKE 1 TABLET BY MOUTH EVERY DAY IN THE EVENING    doxycycline (ORACEA) 40 mg capsule TAKE 1 CAPSULE BY MOUTH EVERY DAY    fluticasone (FLONASE) 50 mcg/actuation nasal spray 1 spray by Each Nare route once daily.    metronidazole 1% (METROGEL) 1 % Gel Apply topically once daily.    omega 3-dha-epa-fish oil (FISH OIL) 1,000 mg (120 mg-180 mg) Cap Take 1 capsule by mouth once daily.    PREMARIN 0.625 mg tablet Take 0.625 mg by mouth once daily.     simvastatin (ZOCOR) 10 MG tablet TAKE ONE TABLET BY MOUTH IN THE  EVENING AT BEDTIME ONCE A DAY    triamterene-hydrochlorothiazide 37.5-25 mg (DYAZIDE) 37.5-25 mg per capsule Take 1 capsule by mouth daily as needed.    zolpidem (AMBIEN) 10 mg Tab 1 (one) time each day in the evening.     No current facility-administered medications for this visit.        Review of patient's allergies indicates:   Allergen Reactions    Hydrocodone Hives    Ciprofloxacin Hives    Tetanus vaccines and toxoid Hives and Swelling     Hives and arm was very swollen--as an adult       Family History   Problem Relation Age of Onset    Hypertension Mother     Hypertension Father     Stroke Father 51    Diabetes type II Father     Polycystic kidney disease Brother     Bone cancer Maternal Aunt     Breast cancer Maternal Aunt     Melanoma Neg Hx     Psoriasis Neg Hx     Lupus Neg Hx     Eczema Neg Hx        Social History     Socioeconomic History    Marital status:      Spouse name: Not on file    Number of children: Not on file    Years of education: Not on file    Highest education level: Not on file   Occupational History    Not on file   Social Needs    Financial resource strain: Not hard at all    Food insecurity:     Worry: Never true     Inability: Never true    Transportation needs:     Medical: No     Non-medical: No   Tobacco Use    Smoking status: Never Smoker    Smokeless tobacco: Never Used   Substance and Sexual Activity    Alcohol use: Yes     Frequency: Monthly or less     Drinks per session: 1 or 2     Binge frequency: Never     Comment: rare    Drug use: No    Sexual activity: Not Currently   Lifestyle    Physical activity:     Days per week: 2 days     Minutes per session: 30 min    Stress: Not at all   Relationships    Social connections:     Talks on phone: More than three times a week     Gets together: Once a week     Attends [employer] service: Not on file     Active member of club or organization: Yes     Attends meetings of clubs or  organizations: 1 to 4 times per year     Relationship status:    Other Topics Concern    Are you pregnant or think you may be? Not Asked    Breast-feeding Not Asked   Social History Narrative    Not on file       History of present illness:  Right knee pain. She had an injury about a month ago where a car door blue open and had direct blow to her right knee may have been some twisting as well in the right knee. He has had significant pain swelling and limp in the right knee since that time. She points to the lateral side of the knee. Two thousand seventeen we have arthroscopy report defects medial femoral condyle but nothing lateral.  The knee had been doing well after that chondroplasty from 2017 until now      Review of Systems:    Constitution: Negative for chills, fever, and sweats.  Negative for unexplained weight loss.    HENT:  Negative for headaches and blurry vision.    Cardiovascular:Negative for chest pain or irregular heart beat. Negative for hypertension.    Respiratory:  Negative for cough and shortness of breath.    Gastrointestinal: Negative for abdominal pain, heartburn, melena, nausea, and vomitting.    Genitourinary:  Negative bladder incontinence and dysuria.    Musculoskeletal:  See HPI for details.     Neurological: Negative for numbness.    Psychiatric/Behavioral: Negative for depression.  The patient is not nervous/anxious.      Endocrine: Negative for polyuria    Hematologic/Lymphatic: Negative for bleeding problem.  Does not bruise/bleed easily.    Skin: Negative for poor would healing and rash    Objective:      Physical Examination:    Vital Signs:    Vitals:    11/25/19 1514   BP: (!) 128/90   Pulse: 107       Body mass index is 30.67 kg/m².    This a well-developed, well nourished patient in no acute distress.  They are alert and oriented and cooperative to examination.        So physical exam right knee shows a mild quad atrophy minimal antalgic gait.  There is tenderness  along the lateral joint line there is some pain with lateral Bernard not on the medial side nothing around the patella no effusion no ecchymosis good range of motion right knee.  Pertinent New Results:    XRAY Report / Interpretation:   AP lateral sunrise right knee shows no significant arthritic changes in any compartment minimal spur lateral on the patella. There is no acute findings.  Neutral alignment.  Signature    Assessment/Plan:      Looking for a lateral meniscus tear.  We need the MRI.  She has daily pain and swelling. See her back after the MR she can work in the meantime with pain      This note was created using Dragon voice recognition software that occasionally misinterpreted phrases or words.

## 2019-12-02 ENCOUNTER — HOSPITAL ENCOUNTER (OUTPATIENT)
Dept: RADIOLOGY | Facility: HOSPITAL | Age: 54
Discharge: HOME | End: 2019-12-02
Attending: ORTHOPAEDIC SURGERY
Payer: COMMERCIAL

## 2019-12-02 DIAGNOSIS — S83.281A ACUTE LATERAL MENISCUS TEAR OF RIGHT KNEE, INITIAL ENCOUNTER: ICD-10-CM

## 2019-12-02 DIAGNOSIS — Z98.890 HISTORY OF ARTHROSCOPY OF RIGHT KNEE: ICD-10-CM

## 2019-12-02 PROCEDURE — 73721 MRI JNT OF LWR EXTRE W/O DYE: CPT | Mod: TC,PO,RT

## 2019-12-16 ENCOUNTER — TELEPHONE (OUTPATIENT)
Dept: ORTHOPEDICS | Facility: CLINIC | Age: 54
End: 2019-12-16

## 2019-12-16 ENCOUNTER — OFFICE VISIT (OUTPATIENT)
Dept: ORTHOPEDICS | Facility: CLINIC | Age: 54
End: 2019-12-16
Payer: COMMERCIAL

## 2019-12-16 VITALS
SYSTOLIC BLOOD PRESSURE: 131 MMHG | BODY MASS INDEX: 30.36 KG/M2 | WEIGHT: 165 LBS | DIASTOLIC BLOOD PRESSURE: 89 MMHG | HEIGHT: 62 IN | HEART RATE: 104 BPM

## 2019-12-16 DIAGNOSIS — M22.41 CHONDROMALACIA OF RIGHT PATELLA: Primary | ICD-10-CM

## 2019-12-16 DIAGNOSIS — M23.41 LOOSE BODY OF KNEE, RIGHT: ICD-10-CM

## 2019-12-16 PROCEDURE — 3008F BODY MASS INDEX DOCD: CPT | Mod: S$GLB,,, | Performed by: ORTHOPAEDIC SURGERY

## 2019-12-16 PROCEDURE — 3075F SYST BP GE 130 - 139MM HG: CPT | Mod: S$GLB,,, | Performed by: ORTHOPAEDIC SURGERY

## 2019-12-16 PROCEDURE — 99213 PR OFFICE/OUTPT VISIT, EST, LEVL III, 20-29 MIN: ICD-10-PCS | Mod: S$GLB,,, | Performed by: ORTHOPAEDIC SURGERY

## 2019-12-16 PROCEDURE — 3079F DIAST BP 80-89 MM HG: CPT | Mod: S$GLB,,, | Performed by: ORTHOPAEDIC SURGERY

## 2019-12-16 PROCEDURE — 3075F PR MOST RECENT SYSTOLIC BLOOD PRESS GE 130-139MM HG: ICD-10-PCS | Mod: S$GLB,,, | Performed by: ORTHOPAEDIC SURGERY

## 2019-12-16 PROCEDURE — 3079F PR MOST RECENT DIASTOLIC BLOOD PRESSURE 80-89 MM HG: ICD-10-PCS | Mod: S$GLB,,, | Performed by: ORTHOPAEDIC SURGERY

## 2019-12-16 PROCEDURE — 3008F PR BODY MASS INDEX (BMI) DOCUMENTED: ICD-10-PCS | Mod: S$GLB,,, | Performed by: ORTHOPAEDIC SURGERY

## 2019-12-16 PROCEDURE — 99213 OFFICE O/P EST LOW 20 MIN: CPT | Mod: S$GLB,,, | Performed by: ORTHOPAEDIC SURGERY

## 2019-12-16 NOTE — H&P (VIEW-ONLY)
[facility]    Subjective:     Chief Complaint:   Chief Complaint   Patient presents with    Right Knee - Pain     Right knee pain/MRI results 11.25.19. States that her pain is about the same.        Past Medical History:   Diagnosis Date    Back spasm 6/7/2018    High cholesterol     Hormone replacement therapy (HRT) 6/7/2018    HTN (hypertension) 6/7/2018    Hyperlipidemia, mixed 6/7/2018    Hypertension     Insomnia 6/7/2018    Rosacea 6/7/2018       Past Surgical History:   Procedure Laterality Date    APPENDECTOMY      BREAST BIOPSY Right 15 yrs ago    benign    BREAST SURGERY Left     benign cyst removal x2    COLONOSCOPY N/A 4/12/2019    Procedure: COLONOSCOPY;  Surgeon: Dennis Rosario MD;  Location: [facility];  Service: Endoscopy;  Laterality: N/A;    GALLBLADDER SURGERY      HYSTERECTOMY      nephrolithiasis      PLANTAR FASCIA SURGERY Left     right knee arthroscopy      SHOULDER SURGERY Right     arthroscopy    TOTAL ABDOMINAL HYSTERECTOMY W/ BILATERAL SALPINGOOPHORECTOMY      ovarian cysts       Current Outpatient Medications   Medication Sig    amlodipine-benazepril 10-20mg (LOTREL) 10-20 mg per capsule Take 1 capsule by mouth once daily.    cetirizine (ZYRTEC) 10 MG tablet Take 10 mg by mouth once daily.    cyclobenzaprine (FLEXERIL) 10 MG tablet TAKE 1 TABLET BY MOUTH EVERY DAY IN THE EVENING    doxycycline (ORACEA) 40 mg capsule TAKE 1 CAPSULE BY MOUTH EVERY DAY    fluticasone (FLONASE) 50 mcg/actuation nasal spray 1 spray by Each Nare route once daily.    metronidazole 1% (METROGEL) 1 % Gel Apply topically once daily.    omega 3-dha-epa-fish oil (FISH OIL) 1,000 mg (120 mg-180 mg) Cap Take 1 capsule by mouth once daily.    PREMARIN 0.625 mg tablet Take 0.625 mg by mouth once daily.     simvastatin (ZOCOR) 10 MG tablet TAKE ONE TABLET BY MOUTH IN THE EVENING AT BEDTIME ONCE A DAY    triamterene-hydrochlorothiazide 37.5-25 mg (DYAZIDE) 37.5-25 mg per capsule  Take 1 capsule by mouth daily as needed.    zolpidem (AMBIEN) 10 mg Tab 1 (one) time each day in the evening.     No current facility-administered medications for this visit.        Review of patient's allergies indicates:   Allergen Reactions    Hydrocodone Hives    Ciprofloxacin Hives    Tetanus vaccines and toxoid Hives and Swelling     Hives and arm was very swollen--as an adult       Family History   Problem Relation Age of Onset    Hypertension Mother     Hypertension Father     Stroke Father 51    Diabetes type II Father     Polycystic kidney disease Brother     Bone cancer Maternal Aunt     Breast cancer Maternal Aunt     Melanoma Neg Hx     Psoriasis Neg Hx     Lupus Neg Hx     Eczema Neg Hx        Social History     Socioeconomic History    Marital status:      Spouse name: Not on file    Number of children: Not on file    Years of education: Not on file    Highest education level: Not on file   Occupational History    Not on file   Social Needs    Financial resource strain: Not hard at all    Food insecurity:     Worry: Never true     Inability: Never true    Transportation needs:     Medical: No     Non-medical: No   Tobacco Use    Smoking status: Never Smoker    Smokeless tobacco: Never Used   Substance and Sexual Activity    Alcohol use: Yes     Frequency: Monthly or less     Drinks per session: 1 or 2     Binge frequency: Never     Comment: rare    Drug use: No    Sexual activity: Not Currently   Lifestyle    Physical activity:     Days per week: 2 days     Minutes per session: 30 min    Stress: Not at all   Relationships    Social connections:     Talks on phone: More than three times a week     Gets together: Once a week     Attends Confucianist service: Not on file     Active member of club or organization: Yes     Attends meetings of clubs or organizations: 1 to 4 times per year     Relationship status:    Other Topics Concern    Are you pregnant or think  you may be? Not Asked    Breast-feeding Not Asked   Social History Narrative    Not on file       History of present illness:  So we have an MRI of her right knee. There appears to be a very definitive loose body which is behind the medial meniscus.  As well as a pretty discrete lesion of the midportion of the patella.  There is really not degenerative changes of the patella but there is a did a clearly a lesion of the midportion of the patella this not a fracture.  Menisci looked normal subchondral weight-bearing surfaces looked normal. So this is consistent with a loose body presumably coming from the patella. Of the seemed to start with a direct blow injury to the knee cap.  That makes sense      Review of Systems:    Constitution: Negative for chills, fever, and sweats.  Negative for unexplained weight loss.    HENT:  Negative for headaches and blurry vision.    Cardiovascular:Negative for chest pain or irregular heart beat. Negative for hypertension.    Respiratory:  Negative for cough and shortness of breath.    Gastrointestinal: Negative for abdominal pain, heartburn, melena, nausea, and vomitting.    Genitourinary:  Negative bladder incontinence and dysuria.    Musculoskeletal:  See HPI for details.     Neurological: Negative for numbness.    Psychiatric/Behavioral: Negative for depression.  The patient is not nervous/anxious.      Endocrine: Negative for polyuria    Hematologic/Lymphatic: Negative for bleeding problem.  Does not bruise/bleed easily.    Skin: Negative for poor would healing and rash    Objective:      Physical Examination:    Vital Signs:    Vitals:    12/16/19 1505   BP: 131/89   Pulse: 104       Body mass index is 30.67 kg/m².    This a well-developed, well nourished patient in no acute distress.  They are alert and oriented and cooperative to examination.        Some physical exam she has a little bit of pain manipulating the patella she has good range of motion.  Bernard vague pain  inside the knee and this case is little more so lateral than medial.  There is mild quad atrophy on the right.  She does not have a limp today but she says at times he clearly has some catching and a tendency to give way at times.  Pertinent New Results:    XRAY Report / Interpretation:       Assessment/Plan:      So our plan of we have a loose body presumably from a chondral defect in the patella. Our plan is arthroscopy for loose body and to do some chondroplasty of the patella she wants to proceed with that in the next couple weeks so we did history and physical consent to do so.      This note was created using Dragon voice recognition software that occasionally misinterpreted phrases or words.

## 2019-12-16 NOTE — PROGRESS NOTES
[facility]    Subjective:     Chief Complaint:   Chief Complaint   Patient presents with    Right Knee - Pain     Right knee pain/MRI results 11.25.19. States that her pain is about the same.        Past Medical History:   Diagnosis Date    Back spasm 6/7/2018    High cholesterol     Hormone replacement therapy (HRT) 6/7/2018    HTN (hypertension) 6/7/2018    Hyperlipidemia, mixed 6/7/2018    Hypertension     Insomnia 6/7/2018    Rosacea 6/7/2018       Past Surgical History:   Procedure Laterality Date    APPENDECTOMY      BREAST BIOPSY Right 15 yrs ago    benign    BREAST SURGERY Left     benign cyst removal x2    COLONOSCOPY N/A 4/12/2019    Procedure: COLONOSCOPY;  Surgeon: Dennis Rosario MD;  Location: [facility];  Service: Endoscopy;  Laterality: N/A;    GALLBLADDER SURGERY      HYSTERECTOMY      nephrolithiasis      PLANTAR FASCIA SURGERY Left     right knee arthroscopy      SHOULDER SURGERY Right     arthroscopy    TOTAL ABDOMINAL HYSTERECTOMY W/ BILATERAL SALPINGOOPHORECTOMY      ovarian cysts       Current Outpatient Medications   Medication Sig    amlodipine-benazepril 10-20mg (LOTREL) 10-20 mg per capsule Take 1 capsule by mouth once daily.    cetirizine (ZYRTEC) 10 MG tablet Take 10 mg by mouth once daily.    cyclobenzaprine (FLEXERIL) 10 MG tablet TAKE 1 TABLET BY MOUTH EVERY DAY IN THE EVENING    doxycycline (ORACEA) 40 mg capsule TAKE 1 CAPSULE BY MOUTH EVERY DAY    fluticasone (FLONASE) 50 mcg/actuation nasal spray 1 spray by Each Nare route once daily.    metronidazole 1% (METROGEL) 1 % Gel Apply topically once daily.    omega 3-dha-epa-fish oil (FISH OIL) 1,000 mg (120 mg-180 mg) Cap Take 1 capsule by mouth once daily.    PREMARIN 0.625 mg tablet Take 0.625 mg by mouth once daily.     simvastatin (ZOCOR) 10 MG tablet TAKE ONE TABLET BY MOUTH IN THE EVENING AT BEDTIME ONCE A DAY    triamterene-hydrochlorothiazide 37.5-25 mg (DYAZIDE) 37.5-25 mg per capsule  Take 1 capsule by mouth daily as needed.    zolpidem (AMBIEN) 10 mg Tab 1 (one) time each day in the evening.     No current facility-administered medications for this visit.        Review of patient's allergies indicates:   Allergen Reactions    Hydrocodone Hives    Ciprofloxacin Hives    Tetanus vaccines and toxoid Hives and Swelling     Hives and arm was very swollen--as an adult       Family History   Problem Relation Age of Onset    Hypertension Mother     Hypertension Father     Stroke Father 51    Diabetes type II Father     Polycystic kidney disease Brother     Bone cancer Maternal Aunt     Breast cancer Maternal Aunt     Melanoma Neg Hx     Psoriasis Neg Hx     Lupus Neg Hx     Eczema Neg Hx        Social History     Socioeconomic History    Marital status:      Spouse name: Not on file    Number of children: Not on file    Years of education: Not on file    Highest education level: Not on file   Occupational History    Not on file   Social Needs    Financial resource strain: Not hard at all    Food insecurity:     Worry: Never true     Inability: Never true    Transportation needs:     Medical: No     Non-medical: No   Tobacco Use    Smoking status: Never Smoker    Smokeless tobacco: Never Used   Substance and Sexual Activity    Alcohol use: Yes     Frequency: Monthly or less     Drinks per session: 1 or 2     Binge frequency: Never     Comment: rare    Drug use: No    Sexual activity: Not Currently   Lifestyle    Physical activity:     Days per week: 2 days     Minutes per session: 30 min    Stress: Not at all   Relationships    Social connections:     Talks on phone: More than three times a week     Gets together: Once a week     Attends Yazidism service: Not on file     Active member of club or organization: Yes     Attends meetings of clubs or organizations: 1 to 4 times per year     Relationship status:    Other Topics Concern    Are you pregnant or think  you may be? Not Asked    Breast-feeding Not Asked   Social History Narrative    Not on file       History of present illness:  So we have an MRI of her right knee. There appears to be a very definitive loose body which is behind the medial meniscus.  As well as a pretty discrete lesion of the midportion of the patella.  There is really not degenerative changes of the patella but there is a did a clearly a lesion of the midportion of the patella this not a fracture.  Menisci looked normal subchondral weight-bearing surfaces looked normal. So this is consistent with a loose body presumably coming from the patella. Of the seemed to start with a direct blow injury to the knee cap.  That makes sense      Review of Systems:    Constitution: Negative for chills, fever, and sweats.  Negative for unexplained weight loss.    HENT:  Negative for headaches and blurry vision.    Cardiovascular:Negative for chest pain or irregular heart beat. Negative for hypertension.    Respiratory:  Negative for cough and shortness of breath.    Gastrointestinal: Negative for abdominal pain, heartburn, melena, nausea, and vomitting.    Genitourinary:  Negative bladder incontinence and dysuria.    Musculoskeletal:  See HPI for details.     Neurological: Negative for numbness.    Psychiatric/Behavioral: Negative for depression.  The patient is not nervous/anxious.      Endocrine: Negative for polyuria    Hematologic/Lymphatic: Negative for bleeding problem.  Does not bruise/bleed easily.    Skin: Negative for poor would healing and rash    Objective:      Physical Examination:    Vital Signs:    Vitals:    12/16/19 1505   BP: 131/89   Pulse: 104       Body mass index is 30.67 kg/m².    This a well-developed, well nourished patient in no acute distress.  They are alert and oriented and cooperative to examination.        Some physical exam she has a little bit of pain manipulating the patella she has good range of motion.  Bernard vague pain  inside the knee and this case is little more so lateral than medial.  There is mild quad atrophy on the right.  She does not have a limp today but she says at times he clearly has some catching and a tendency to give way at times.  Pertinent New Results:    XRAY Report / Interpretation:       Assessment/Plan:      So our plan of we have a loose body presumably from a chondral defect in the patella. Our plan is arthroscopy for loose body and to do some chondroplasty of the patella she wants to proceed with that in the next couple weeks so we did history and physical consent to do so.      This note was created using Dragon voice recognition software that occasionally misinterpreted phrases or words.

## 2019-12-17 DIAGNOSIS — M23.41 LOOSE BODY OF KNEE, RIGHT: ICD-10-CM

## 2019-12-17 DIAGNOSIS — M22.41 CHONDROMALACIA OF RIGHT PATELLA: Primary | ICD-10-CM

## 2019-12-19 ENCOUNTER — HOSPITAL ENCOUNTER (OUTPATIENT)
Dept: RADIOLOGY | Facility: HOSPITAL | Age: 54
Discharge: HOME | End: 2019-12-19
Attending: ORTHOPAEDIC SURGERY
Payer: COMMERCIAL

## 2019-12-19 ENCOUNTER — HOSPITAL ENCOUNTER (OUTPATIENT)
Dept: PREADMISSION TESTING | Facility: HOSPITAL | Age: 54
Discharge: HOME | End: 2019-12-19
Attending: ORTHOPAEDIC SURGERY
Payer: COMMERCIAL

## 2019-12-19 VITALS — BODY MASS INDEX: 30.36 KG/M2 | HEIGHT: 62 IN | WEIGHT: 165 LBS

## 2019-12-19 DIAGNOSIS — M22.41 CHONDROMALACIA OF RIGHT PATELLA: ICD-10-CM

## 2019-12-19 DIAGNOSIS — M23.41 LOOSE BODY OF KNEE, RIGHT: ICD-10-CM

## 2019-12-19 LAB
ALBUMIN SERPL BCP-MCNC: 4.4 G/DL (ref 3.5–5.2)
ALP SERPL-CCNC: 72 U/L (ref 55–135)
ALT SERPL W/O P-5'-P-CCNC: 28 U/L (ref 10–44)
ANION GAP SERPL CALC-SCNC: 12 MMOL/L (ref 8–16)
AST SERPL-CCNC: 22 U/L (ref 10–40)
BASOPHILS # BLD AUTO: 0.06 K/UL (ref 0–0.2)
BASOPHILS NFR BLD: 0.6 % (ref 0–1.9)
BILIRUB SERPL-MCNC: 0.3 MG/DL (ref 0.1–1)
BUN SERPL-MCNC: 16 MG/DL (ref 6–20)
CALCIUM SERPL-MCNC: 10 MG/DL (ref 8.7–10.5)
CHLORIDE SERPL-SCNC: 103 MMOL/L (ref 95–110)
CO2 SERPL-SCNC: 26 MMOL/L (ref 23–29)
CREAT SERPL-MCNC: 0.9 MG/DL (ref 0.5–1.4)
DIFFERENTIAL METHOD: ABNORMAL
EOSINOPHIL # BLD AUTO: 0.3 K/UL (ref 0–0.5)
EOSINOPHIL NFR BLD: 3.3 % (ref 0–8)
ERYTHROCYTE [DISTWIDTH] IN BLOOD BY AUTOMATED COUNT: 12.2 % (ref 11.5–14.5)
EST. GFR  (AFRICAN AMERICAN): >60 ML/MIN/1.73 M^2
EST. GFR  (NON AFRICAN AMERICAN): >60 ML/MIN/1.73 M^2
GLUCOSE SERPL-MCNC: 130 MG/DL (ref 70–110)
HCT VFR BLD AUTO: 37 % (ref 37–48.5)
HGB BLD-MCNC: 12.1 G/DL (ref 12–16)
IMM GRANULOCYTES # BLD AUTO: 0.04 K/UL (ref 0–0.04)
LYMPHOCYTES # BLD AUTO: 3.2 K/UL (ref 1–4.8)
LYMPHOCYTES NFR BLD: 32.3 % (ref 18–48)
MCH RBC QN AUTO: 28.8 PG (ref 27–31)
MCHC RBC AUTO-ENTMCNC: 32.7 G/DL (ref 32–36)
MCV RBC AUTO: 88 FL (ref 82–98)
MONOCYTES # BLD AUTO: 0.6 K/UL (ref 0.3–1)
MONOCYTES NFR BLD: 6.1 % (ref 4–15)
NEUTROPHILS # BLD AUTO: 5.7 K/UL (ref 1.8–7.7)
NEUTROPHILS NFR BLD: 57.3 % (ref 38–73)
NRBC BLD-RTO: 0 /100 WBC
PLATELET # BLD AUTO: 380 K/UL (ref 150–350)
PMV BLD AUTO: 9.7 FL (ref 9.2–12.9)
POTASSIUM SERPL-SCNC: 3.8 MMOL/L (ref 3.5–5.1)
PROT SERPL-MCNC: 7.5 G/DL (ref 6–8.4)
RBC # BLD AUTO: 4.2 M/UL (ref 4–5.4)
SODIUM SERPL-SCNC: 141 MMOL/L (ref 136–145)
WBC # BLD AUTO: 9.89 K/UL (ref 3.9–12.7)

## 2019-12-19 PROCEDURE — 99900104 DSU ONLY-NO CHARGE-EA ADD'L HR (STAT)

## 2019-12-19 PROCEDURE — 93005 ELECTROCARDIOGRAM TRACING: CPT

## 2019-12-19 PROCEDURE — 71046 X-RAY EXAM CHEST 2 VIEWS: CPT | Mod: TC,FY

## 2019-12-19 PROCEDURE — 93010 EKG 12-LEAD: ICD-10-PCS | Mod: ,,, | Performed by: INTERNAL MEDICINE

## 2019-12-19 PROCEDURE — 93010 ELECTROCARDIOGRAM REPORT: CPT | Mod: ,,, | Performed by: INTERNAL MEDICINE

## 2019-12-19 PROCEDURE — 99900103 DSU ONLY-NO CHARGE-INITIAL HR (STAT)

## 2019-12-19 PROCEDURE — 80053 COMPREHEN METABOLIC PANEL: CPT

## 2019-12-19 PROCEDURE — 71046 X-RAY EXAM CHEST 2 VIEWS: CPT | Mod: 26,,, | Performed by: RADIOLOGY

## 2019-12-19 PROCEDURE — 36415 COLL VENOUS BLD VENIPUNCTURE: CPT

## 2019-12-19 PROCEDURE — 85025 COMPLETE CBC W/AUTO DIFF WBC: CPT

## 2019-12-19 PROCEDURE — 71046 XR CHEST PA AND LATERAL: ICD-10-PCS | Mod: 26,,, | Performed by: RADIOLOGY

## 2019-12-19 RX ORDER — NAPROXEN SODIUM 220 MG
220 TABLET ORAL
COMMUNITY
End: 2023-12-22

## 2019-12-19 NOTE — DISCHARGE INSTRUCTIONS

## 2019-12-23 DIAGNOSIS — M22.41 CHONDROMALACIA OF RIGHT PATELLA: Primary | ICD-10-CM

## 2019-12-23 DIAGNOSIS — M23.41 BODIES, LOOSE, KNEE, RIGHT: ICD-10-CM

## 2019-12-23 DIAGNOSIS — M23.41 LOOSE BODY OF KNEE, RIGHT: ICD-10-CM

## 2019-12-27 ENCOUNTER — HOSPITAL ENCOUNTER (OUTPATIENT)
Facility: HOSPITAL | Age: 54
Discharge: HOME | End: 2019-12-27
Attending: ORTHOPAEDIC SURGERY | Admitting: ORTHOPAEDIC SURGERY
Payer: COMMERCIAL

## 2019-12-27 ENCOUNTER — ANESTHESIA EVENT (OUTPATIENT)
Dept: SURGERY | Facility: HOSPITAL | Age: 54
End: 2019-12-27
Payer: COMMERCIAL

## 2019-12-27 ENCOUNTER — ANESTHESIA (OUTPATIENT)
Dept: SURGERY | Facility: HOSPITAL | Age: 54
End: 2019-12-27
Payer: COMMERCIAL

## 2019-12-27 VITALS
RESPIRATION RATE: 16 BRPM | OXYGEN SATURATION: 100 % | DIASTOLIC BLOOD PRESSURE: 87 MMHG | HEIGHT: 61 IN | WEIGHT: 160 LBS | SYSTOLIC BLOOD PRESSURE: 136 MMHG | HEART RATE: 80 BPM | BODY MASS INDEX: 30.21 KG/M2 | TEMPERATURE: 97 F

## 2019-12-27 DIAGNOSIS — M22.41 CHONDROMALACIA OF RIGHT PATELLA: ICD-10-CM

## 2019-12-27 DIAGNOSIS — M23.41 LOOSE BODY OF KNEE, RIGHT: ICD-10-CM

## 2019-12-27 DIAGNOSIS — M23.41 BODIES, LOOSE, KNEE, RIGHT: ICD-10-CM

## 2019-12-27 PROCEDURE — 25000003 PHARM REV CODE 250: Performed by: ORTHOPAEDIC SURGERY

## 2019-12-27 PROCEDURE — 37000009 HC ANESTHESIA EA ADD 15 MINS: Performed by: ORTHOPAEDIC SURGERY

## 2019-12-27 PROCEDURE — 71000015 HC POSTOP RECOV 1ST HR: Performed by: ORTHOPAEDIC SURGERY

## 2019-12-27 PROCEDURE — 63600175 PHARM REV CODE 636 W HCPCS: Performed by: ANESTHESIOLOGY

## 2019-12-27 PROCEDURE — 27000671 HC TUBING MICROBORE EXT: Performed by: ANESTHESIOLOGY

## 2019-12-27 PROCEDURE — 37000008 HC ANESTHESIA 1ST 15 MINUTES: Performed by: ORTHOPAEDIC SURGERY

## 2019-12-27 PROCEDURE — 36000710: Performed by: ORTHOPAEDIC SURGERY

## 2019-12-27 PROCEDURE — 27201423 OPTIME MED/SURG SUP & DEVICES STERILE SUPPLY: Performed by: ORTHOPAEDIC SURGERY

## 2019-12-27 PROCEDURE — 36000711: Performed by: ORTHOPAEDIC SURGERY

## 2019-12-27 PROCEDURE — 25000003 PHARM REV CODE 250: Performed by: NURSE ANESTHETIST, CERTIFIED REGISTERED

## 2019-12-27 PROCEDURE — 71000033 HC RECOVERY, INTIAL HOUR: Performed by: ORTHOPAEDIC SURGERY

## 2019-12-27 PROCEDURE — 71000039 HC RECOVERY, EACH ADD'L HOUR: Performed by: ORTHOPAEDIC SURGERY

## 2019-12-27 PROCEDURE — 25000003 PHARM REV CODE 250: Performed by: ANESTHESIOLOGY

## 2019-12-27 PROCEDURE — 27000650 HC AIRWAY EXCHANGE: Performed by: ANESTHESIOLOGY

## 2019-12-27 PROCEDURE — 27201107 HC STYLET, STANDARD: Performed by: ANESTHESIOLOGY

## 2019-12-27 PROCEDURE — 27000673 HC TUBING BLOOD Y: Performed by: ANESTHESIOLOGY

## 2019-12-27 PROCEDURE — 27000284 HC CANNULA NASAL: Performed by: ANESTHESIOLOGY

## 2019-12-27 PROCEDURE — 27202105 HC BIS BILATERAL SENSOR: Performed by: ANESTHESIOLOGY

## 2019-12-27 PROCEDURE — 27000653 HC CATH, IV CATHLN: Performed by: ANESTHESIOLOGY

## 2019-12-27 PROCEDURE — 63600175 PHARM REV CODE 636 W HCPCS: Performed by: NURSE ANESTHETIST, CERTIFIED REGISTERED

## 2019-12-27 RX ORDER — LIDOCAINE HYDROCHLORIDE 20 MG/ML
INJECTION, SOLUTION EPIDURAL; INFILTRATION; INTRACAUDAL; PERINEURAL
Status: DISCONTINUED | OUTPATIENT
Start: 2019-12-27 | End: 2019-12-27

## 2019-12-27 RX ORDER — OXYCODONE HYDROCHLORIDE 5 MG/1
5 TABLET ORAL
Status: DISCONTINUED | OUTPATIENT
Start: 2019-12-27 | End: 2019-12-27 | Stop reason: HOSPADM

## 2019-12-27 RX ORDER — PROPOFOL 10 MG/ML
VIAL (ML) INTRAVENOUS
Status: DISCONTINUED | OUTPATIENT
Start: 2019-12-27 | End: 2019-12-27

## 2019-12-27 RX ORDER — ACETAMINOPHEN 500 MG
1000 TABLET ORAL ONCE
Status: COMPLETED | OUTPATIENT
Start: 2019-12-27 | End: 2019-12-27

## 2019-12-27 RX ORDER — ROCURONIUM BROMIDE 10 MG/ML
INJECTION, SOLUTION INTRAVENOUS
Status: DISCONTINUED | OUTPATIENT
Start: 2019-12-27 | End: 2019-12-27

## 2019-12-27 RX ORDER — PROMETHAZINE HYDROCHLORIDE 25 MG/1
25 TABLET ORAL EVERY 4 HOURS
Qty: 30 TABLET | Refills: 0
Start: 2019-12-27 | End: 2020-03-02

## 2019-12-27 RX ORDER — DIPHENHYDRAMINE HYDROCHLORIDE 50 MG/ML
12.5 INJECTION INTRAMUSCULAR; INTRAVENOUS
Status: DISCONTINUED | OUTPATIENT
Start: 2019-12-27 | End: 2019-12-27 | Stop reason: HOSPADM

## 2019-12-27 RX ORDER — ACETAMINOPHEN 325 MG/1
650 TABLET ORAL EVERY 4 HOURS PRN
Status: DISCONTINUED | OUTPATIENT
Start: 2019-12-27 | End: 2019-12-27 | Stop reason: HOSPADM

## 2019-12-27 RX ORDER — ACETAMINOPHEN AND CODEINE PHOSPHATE 300; 30 MG/1; MG/1
1 TABLET ORAL EVERY 4 HOURS PRN
Qty: 30 TABLET | Refills: 0
Start: 2019-12-27 | End: 2020-01-06

## 2019-12-27 RX ORDER — SODIUM CHLORIDE, SODIUM LACTATE, POTASSIUM CHLORIDE, CALCIUM CHLORIDE 600; 310; 30; 20 MG/100ML; MG/100ML; MG/100ML; MG/100ML
INJECTION, SOLUTION INTRAVENOUS CONTINUOUS PRN
Status: DISCONTINUED | OUTPATIENT
Start: 2019-12-27 | End: 2019-12-27

## 2019-12-27 RX ORDER — SUCCINYLCHOLINE CHLORIDE 20 MG/ML
INJECTION INTRAMUSCULAR; INTRAVENOUS
Status: DISCONTINUED | OUTPATIENT
Start: 2019-12-27 | End: 2019-12-27

## 2019-12-27 RX ORDER — SODIUM CHLORIDE 0.9 % (FLUSH) 0.9 %
10 SYRINGE (ML) INJECTION
Status: DISCONTINUED | OUTPATIENT
Start: 2019-12-27 | End: 2019-12-27 | Stop reason: HOSPADM

## 2019-12-27 RX ORDER — DEXAMETHASONE SODIUM PHOSPHATE 4 MG/ML
INJECTION, SOLUTION INTRA-ARTICULAR; INTRALESIONAL; INTRAMUSCULAR; INTRAVENOUS; SOFT TISSUE
Status: DISCONTINUED | OUTPATIENT
Start: 2019-12-27 | End: 2019-12-27

## 2019-12-27 RX ORDER — FENTANYL CITRATE 50 UG/ML
25 INJECTION, SOLUTION INTRAMUSCULAR; INTRAVENOUS
Status: DISCONTINUED | OUTPATIENT
Start: 2019-12-27 | End: 2019-12-27 | Stop reason: HOSPADM

## 2019-12-27 RX ORDER — ONDANSETRON 2 MG/ML
4 INJECTION INTRAMUSCULAR; INTRAVENOUS DAILY PRN
Status: DISCONTINUED | OUTPATIENT
Start: 2019-12-27 | End: 2019-12-27 | Stop reason: HOSPADM

## 2019-12-27 RX ORDER — CEFAZOLIN SODIUM 1 G/3ML
INJECTION, POWDER, FOR SOLUTION INTRAMUSCULAR; INTRAVENOUS
Status: DISCONTINUED | OUTPATIENT
Start: 2019-12-27 | End: 2019-12-27

## 2019-12-27 RX ORDER — FENTANYL CITRATE 50 UG/ML
INJECTION, SOLUTION INTRAMUSCULAR; INTRAVENOUS
Status: DISCONTINUED | OUTPATIENT
Start: 2019-12-27 | End: 2019-12-27

## 2019-12-27 RX ORDER — ONDANSETRON 4 MG/1
8 TABLET, ORALLY DISINTEGRATING ORAL EVERY 8 HOURS PRN
Status: DISCONTINUED | OUTPATIENT
Start: 2019-12-27 | End: 2019-12-27 | Stop reason: HOSPADM

## 2019-12-27 RX ORDER — FENTANYL CITRATE 50 UG/ML
25 INJECTION, SOLUTION INTRAMUSCULAR; INTRAVENOUS
Status: COMPLETED | OUTPATIENT
Start: 2019-12-27 | End: 2019-12-27

## 2019-12-27 RX ORDER — KETOROLAC TROMETHAMINE 30 MG/ML
15 INJECTION, SOLUTION INTRAMUSCULAR; INTRAVENOUS EVERY 6 HOURS PRN
Status: DISCONTINUED | OUTPATIENT
Start: 2019-12-27 | End: 2019-12-27 | Stop reason: HOSPADM

## 2019-12-27 RX ORDER — ONDANSETRON 2 MG/ML
INJECTION INTRAMUSCULAR; INTRAVENOUS
Status: DISCONTINUED | OUTPATIENT
Start: 2019-12-27 | End: 2019-12-27

## 2019-12-27 RX ORDER — MIDAZOLAM HYDROCHLORIDE 1 MG/ML
INJECTION INTRAMUSCULAR; INTRAVENOUS
Status: DISCONTINUED | OUTPATIENT
Start: 2019-12-27 | End: 2019-12-27

## 2019-12-27 RX ORDER — HYDROMORPHONE HYDROCHLORIDE 1 MG/ML
1 INJECTION, SOLUTION INTRAMUSCULAR; INTRAVENOUS; SUBCUTANEOUS EVERY 4 HOURS PRN
Status: DISCONTINUED | OUTPATIENT
Start: 2019-12-27 | End: 2019-12-27 | Stop reason: HOSPADM

## 2019-12-27 RX ORDER — MEPERIDINE HYDROCHLORIDE 50 MG/ML
12.5 INJECTION INTRAMUSCULAR; INTRAVENOUS; SUBCUTANEOUS EVERY 10 MIN PRN
Status: DISCONTINUED | OUTPATIENT
Start: 2019-12-27 | End: 2019-12-27 | Stop reason: HOSPADM

## 2019-12-27 RX ORDER — FENTANYL CITRATE 50 UG/ML
100 INJECTION, SOLUTION INTRAMUSCULAR; INTRAVENOUS
Status: CANCELLED | OUTPATIENT
Start: 2019-12-27

## 2019-12-27 RX ORDER — SODIUM CHLORIDE 0.9 G/100ML
IRRIGANT IRRIGATION
Status: DISCONTINUED | OUTPATIENT
Start: 2019-12-27 | End: 2019-12-27 | Stop reason: HOSPADM

## 2019-12-27 RX ADMIN — FENTANYL CITRATE 25 MCG: 50 INJECTION, SOLUTION INTRAMUSCULAR; INTRAVENOUS at 10:12

## 2019-12-27 RX ADMIN — DEXAMETHASONE SODIUM PHOSPHATE 4 MG: 4 INJECTION, SOLUTION INTRAMUSCULAR; INTRAVENOUS at 09:12

## 2019-12-27 RX ADMIN — FENTANYL CITRATE 25 MCG: 50 INJECTION, SOLUTION INTRAMUSCULAR; INTRAVENOUS at 11:12

## 2019-12-27 RX ADMIN — OXYCODONE HYDROCHLORIDE 5 MG: 5 TABLET ORAL at 10:12

## 2019-12-27 RX ADMIN — SUCCINYLCHOLINE CHLORIDE 140 MG: 20 INJECTION, SOLUTION INTRAMUSCULAR; INTRAVENOUS at 09:12

## 2019-12-27 RX ADMIN — PROPOFOL 120 MG: 10 INJECTION, EMULSION INTRAVENOUS at 09:12

## 2019-12-27 RX ADMIN — CEFAZOLIN 2 G: 330 INJECTION, POWDER, FOR SOLUTION INTRAMUSCULAR; INTRAVENOUS at 09:12

## 2019-12-27 RX ADMIN — ACETAMINOPHEN 1000 MG: 500 TABLET, FILM COATED ORAL at 09:12

## 2019-12-27 RX ADMIN — SODIUM CHLORIDE, SODIUM LACTATE, POTASSIUM CHLORIDE, AND CALCIUM CHLORIDE: .6; .31; .03; .02 INJECTION, SOLUTION INTRAVENOUS at 09:12

## 2019-12-27 RX ADMIN — ONDANSETRON 4 MG: 2 INJECTION INTRAMUSCULAR; INTRAVENOUS at 09:12

## 2019-12-27 RX ADMIN — FENTANYL CITRATE 50 MCG: 50 INJECTION INTRAMUSCULAR; INTRAVENOUS at 09:12

## 2019-12-27 RX ADMIN — ROCURONIUM BROMIDE 5 MG: 10 INJECTION, SOLUTION INTRAVENOUS at 09:12

## 2019-12-27 RX ADMIN — MIDAZOLAM HYDROCHLORIDE 2 MG: 1 INJECTION, SOLUTION INTRAMUSCULAR; INTRAVENOUS at 09:12

## 2019-12-27 RX ADMIN — LIDOCAINE HYDROCHLORIDE 40 MG: 20 INJECTION, SOLUTION EPIDURAL; INFILTRATION; INTRACAUDAL; PERINEURAL at 09:12

## 2019-12-27 NOTE — INTERVAL H&P NOTE
The patient has been examined and the H&P has been reviewed:    I concur with the findings and no changes have occurred since H&P was written.    Anesthesia/Surgery risks, benefits and alternative options discussed and understood by patient/family.          Active Hospital Problems    Diagnosis  POA    Bodies, loose, knee, right [M23.41]  Yes      Resolved Hospital Problems   No resolved problems to display.

## 2019-12-27 NOTE — ANESTHESIA PREPROCEDURE EVALUATION
12/27/2019  Karlene Damon is a 54 y.o., female.    Anesthesia Evaluation    I have reviewed the Patient Summary Reports.    I have reviewed the Nursing Notes.   I have reviewed the Medications.     Review of Systems  Anesthesia Hx:  Neg history of prior surgery. Denies Family Hx of Anesthesia complications.   Denies Personal Hx of Anesthesia complications.   Social:  Alcohol Use, Non-Smoker    Cardiovascular:   Hypertension hyperlipidemia    Renal/:   renal calculi    Musculoskeletal:   Lower back spasms     Patient Active Problem List   Diagnosis    Bodies, loose, knee, right    HTN (hypertension)    Hormone replacement therapy (HRT)    Insomnia    Back spasm    Hyperlipidemia, mixed    Rosacea    Obesity, Class I, BMI 30-34.9       Past Surgical History:   Procedure Laterality Date    APPENDECTOMY      BREAST BIOPSY Right 15 yrs ago    benign    BREAST SURGERY Left     benign cyst removal x2    COLONOSCOPY N/A 4/12/2019    Procedure: COLONOSCOPY;  Surgeon: Dennis Rosario MD;  Location: [facility];  Service: Endoscopy;  Laterality: N/A;    GALLBLADDER SURGERY      HYSTERECTOMY      nephrolithiasis      PLANTAR FASCIA SURGERY Left     right knee arthroscopy      SHOULDER SURGERY Right     arthroscopy    TOTAL ABDOMINAL HYSTERECTOMY W/ BILATERAL SALPINGOOPHORECTOMY      ovarian cysts        Tobacco Use:  The patient  reports that she has never smoked. She has never used smokeless tobacco.     Results for orders placed or performed during the hospital encounter of 12/19/19   EKG 12-lead    Collection Time: 12/19/19  2:35 PM    Narrative    Test Reason : M22.41,M23.41,    Vent. Rate : 091 BPM     Atrial Rate : 091 BPM     P-R Int : 170 ms          QRS Dur : 090 ms      QT Int : 360 ms       P-R-T Axes : 000 026 053 degrees     QTc Int : 442 ms    Normal sinus rhythm  Normal  ECG  When compared with ECG of 11-DEC-2017 10:11,  No significant change was found  Confirmed by Dontrell Schilling MD ([id number]) on 12/20/2019 12:00:17 PM    Referred By: CLYDE GRIFFIN           Confirmed By:Dontrell Schilling MD             Lab Results   Component Value Date    WBC 9.89 12/19/2019    HGB 12.1 12/19/2019    HCT 37.0 12/19/2019    MCV 88 12/19/2019     (H) 12/19/2019     BMP  Lab Results   Component Value Date     12/19/2019    K 3.8 12/19/2019     12/19/2019    CO2 26 12/19/2019    BUN 16 12/19/2019    CREATININE 0.9 12/19/2019    CALCIUM 10.0 12/19/2019    ANIONGAP 12 12/19/2019    ESTGFRAFRICA >60 12/19/2019    EGFRNONAA >60 12/19/2019           Physical Exam  General:  Well nourished    Airway/Jaw/Neck:  Airway Findings: Mouth Opening: Normal Tongue: Normal  General Airway Assessment: Adult  Mallampati: II  Improves to II with phonation.  TM Distance: Normal, at least 6 cm  Jaw/Neck Findings:  Neck ROM: Normal ROM       Chest/Lungs:  Chest/Lungs Findings: Clear to auscultation, Normal Respiratory Rate     Heart/Vascular:  Heart Findings: Rate: Normal  Rhythm: Regular Rhythm  Sounds: Normal        Mental Status:  Mental Status Findings:  Cooperative, Alert and Oriented         Anesthesia Plan  Type of Anesthesia, risks & benefits discussed:  Anesthesia Type:  general  Patient's Preference:   Intra-op Monitoring Plan: standard ASA monitors  Intra-op Monitoring Plan Comments:   Post Op Pain Control Plan: multimodal analgesia  Post Op Pain Control Plan Comments:   Induction:   IV  Beta Blocker:  Patient is not currently on a Beta-Blocker (No further documentation required).       Informed Consent: Patient understands risks and agrees with Anesthesia plan.  Questions answered. Anesthesia consent signed with patient.  ASA Score: 2     Day of Surgery Review of History & Physical:    H&P update referred to the surgeon.     Anesthesia Plan Notes: Tylenol 1 gm po   Decadron 4mg IV  Zofran 4mg IV        Ready  For Surgery From Anesthesia Perspective.

## 2019-12-27 NOTE — BRIEF OP NOTE
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  Right leg then prepped and draped satisfactory manner. Exam with the Esmarch elevated tourniquet to 300.  Introduced our Veress needle through a superolateral puncture flex the knee with saline then made anteromedial and lateral portal in line with the previous scars.  Upon entering the anterior portion of the knee we could see there was some scarring from her prior surgery. There was some scar tissue in the anterior compartment was not robust but there was some that could be responsible for some mechanical symptoms so we removed this anterior compartment scarring with a shaver.  The anterior cruciate looked to be normal posterior cruciate was normal I probed the notch looking for any loose body there was none. We then looked along the medial compartment there was some irregularity at the medial femoral condyle weight-bearing surface there was no bone exposed there was some scar tissue probably from a prior procedure I cleaned the scar tissue up there was no bone exposed and cleared up some rough tissue on that medial femoral condyle majority of medial femoral condyle still in reasonable condition no bone exposed.  Tibial plateau medially looked good.  We then probed the medial meniscus it was in good condition anterior and posterior with no evidence for loose body anywhere in the medial compartment we then went to the  lateral compartment the lateral femoral condyle lateral tibial plateau and lateral meniscus all in good condition.  No evidence for loose body we probed it thoroughly.  We then looked at the femoral groove very minimal irregularity in the femoral groove as seen from below.  The looked from above and could see there was some scarring of inferior to the patella which we cleaned up with our shaver the patella itself did have significant chondromalacia and as we cleaned up the loose tissue there was some full-thickness loss down to bone of chondromalacia midportion of patella about half the patella was involved there really was not fibrocartilage covering is but it was mostly down to bone in the midportion patella the femoral groove still in good condition.  Look for loose body did not see any swept the the the notches and swept the gutters and went back anterior looking for loose body that was none prior MRI shown evidence for loose body but was probably an capsulated and posterior and stable.  So as we finished it appeared that the chondromalacia patella was absolutely an issue there was some scarring as well which had been addressed there were no meniscal tears and no loose body we then removed our instruments use Marcaine with epinephrine at portals sutured portals with nylon applied bulky dressing.    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      cyclobenzaprine (FLEXERIL) 10 MG tablet TAKE 1 TABLET BY MOUTH EVERY DAY IN THE EVENING  Qty: 90 tablet, Refills: 1      doxycycline (ORACEA) 40 mg capsule TAKE 1 CAPSULE BY MOUTH EVERY DAY  Qty: 90 capsule, Refills: 1    Associated Diagnoses: Rosacea      fluticasone (FLONASE) 50 mcg/actuation nasal spray 1 spray by Each Nostril route as needed.       naproxen sodium (ANAPROX) 220 MG tablet Take 220 mg by mouth every 12 (twelve) hours.      PREMARIN 0.625 mg tablet Take 0.625 mg by mouth once daily.       simvastatin (ZOCOR) 10 MG tablet TAKE ONE TABLET BY MOUTH IN THE EVENING AT BEDTIME ONCE A DAY      zolpidem (AMBIEN) 10 mg Tab 1 (one) time each day in the evening.      amlodipine-benazepril 10-20mg (LOTREL) 10-20 mg per capsule Take 1 capsule by mouth once daily.  Qty: 90 capsule, Refills: 1      metronidazole 1% (METROGEL) 1 % Gel Apply topically once daily.  Qty: 60 g, Refills: 11      omega 3-dha-epa-fish oil (FISH OIL) 1,000 mg (120 mg-180 mg) Cap Take 1 capsule by mouth once daily.      triamterene-hydrochlorothiazide 37.5-25 mg (DYAZIDE) 37.5-25 mg per capsule Take 1 capsule by mouth daily as needed.  Qty: 90 capsule, Refills: 3           Discharge Procedure Orders   CRUTCHES FOR HOME USE     Order Specific Question Answer Comments   Type: Axillary    Height: 5' 1" (1.549 m)    Weight: 72.6 kg (160 lb)    Length of need (1-99 months): 1      Diet general     Activity as tolerated     Ice to affected area     No driving, operating heavy equipment or signing legal documents while taking pain medication     Call MD for:  temperature >100.4     Call MD for:  persistent nausea and vomiting     Call MD for:  severe uncontrolled pain     Call MD for:  difficulty breathing, headache " or visual disturbances     Call MD for:  redness, tenderness, or signs of infection (pain, swelling, redness, odor or green/yellow discharge around incision site)     Call MD for:  hives     Call MD for:  persistent dizziness or light-headedness     Call MD for:  extreme fatigue     Remove dressing in 48 hours     Shower on day dressing removed (No bath)

## 2019-12-27 NOTE — DISCHARGE INSTRUCTIONS
After Knee Arthroscopy  After surgery, your joint may be swollen, painful, and stiff. Your recovery time will depend on what was done. Your surgeon will tell you when to resume activity and weight bearing. If you had meniscal cartilage or loose bodies removed, you may be told to bear weight early on. After ACL repair, do not pivot or make sudden moves.     You may be told to ride an exercise bike daily. This will help restore your knee's motion and strength.   At home  Follow your surgeons guidelines for healing:  · Elevate your knee as much as possible and ice your knee for 20 minutes. then allow at least 20 minutes before the next icing session.  · Limit weight-bearing, if instructed to do so.  · Keep your knee bandaged according to your surgeon's instructions.  · When you shower, wrap your knee with plastic to keep it dry.  · Take pain medicine as directed.  Your checklist  The checklist below helps remind you what to do after arthroscopy.  ? Schedule your first follow-up visit for 5 days after surgery or as directed by your surgeon.  ? Take care of your incision and bathe as directed.  ? Complete your physical therapy program.  ? Talk with your surgeon about activities you can do immediately and those that need to wait.  Contact your surgeon right away if you have any of the following:  · Fever  · Chills  · Persistent warmth or redness around the knee  · Persistent or increased pain  · Significant swelling in your knee  · Increasing pain in your calf muscle  Date Last Reviewed: 9/22/2015  © 0213-1670 [employer]. [address] [place] [address], [place] [address]. All rights reserved. This information is not intended as a substitute for professional medical care. Always follow your healthcare professional's instructions.        Discharge Instructions: After Your Surgery  Youve just had surgery. During surgery, you were given medicine called anesthesia to keep you relaxed and free of pain. After surgery,  you may have some pain or nausea. This is common. Here are some tips for feeling better and getting well after surgery.     Stay on schedule with your medicine.   Going home  Your healthcare provider will show you how to take care of yourself when you go home. He or she will also answer your questions. Have an adult family member or friend drive you home. For the first 24 hours after your surgery:  · Do not drive or use heavy equipment.  · Do not make important decisions or sign legal papers.  · Do not drink alcohol.  · Have someone stay with you, if needed. He or she can watch for problems and help keep you safe.  Be sure to go to all follow-up visits with your healthcare provider. And rest after your surgery for as long as your healthcare provider tells you to.  Coping with pain  If you have pain after surgery, pain medicine will help you feel better. Take it as told, before pain becomes severe. Also, ask your healthcare provider or pharmacist about other ways to control pain. This might be with heat, ice, or relaxation. And follow any other instructions your surgeon or nurse gives you.  Tips for taking pain medicine  To get the best relief possible, remember these points:  · Pain medicines can upset your stomach. Taking them with a little food may help.  · Most pain relievers taken by mouth need at least 20 to 30 minutes to start to work.  · Taking medicine on a schedule can help you remember to take it. Try to time your medicine so that you can take it before starting an activity. This might be before you get dressed, go for a walk, or sit down for dinner.  · Constipation is a common side effect of pain medicines. Call your healthcare provider before taking any medicines such as laxatives or stool softeners to help ease constipation. Also ask if you should skip any foods. Drinking lots of fluids and eating foods such as fruits and vegetables that are high in fiber can also help. Remember, do not take laxatives  unless your surgeon has prescribed them.  · Drinking alcohol and taking pain medicine can cause dizziness and slow your breathing. It can even be deadly. Do not drink alcohol while taking pain medicine.  · Pain medicine can make you react more slowly to things. Do not drive or run machinery while taking pain medicine.  Your healthcare provider may tell you to take acetaminophen to help ease your pain. Ask him or her how much you are supposed to take each day. Acetaminophen or other pain relievers may interact with your prescription medicines or other over-the-counter (OTC) medicines. Some prescription medicines have acetaminophen and other ingredients. Using both prescription and OTC acetaminophen for pain can cause you to overdose. Read the labels on your OTC medicines with care. This will help you to clearly know the list of ingredients, how much to take, and any warnings. It may also help you not take too much acetaminophen. If you have questions or do not understand the information, ask your pharmacist or healthcare provider to explain it to you before you take the OTC medicine.  Managing nausea  Some people have an upset stomach after surgery. This is often because of anesthesia, pain, or pain medicine, or the stress of surgery. These tips will help you handle nausea and eat healthy foods as you get better. If you were on a special food plan before surgery, ask your healthcare provider if you should follow it while you get better. These tips may help:  · Do not push yourself to eat. Your body will tell you when to eat and how much.  · Start off with clear liquids and soup. They are easier to digest.  · Next try semi-solid foods, such as mashed potatoes, applesauce, and gelatin, as you feel ready.  · Slowly move to solid foods. Dont eat fatty, rich, or spicy foods at first.  · Do not force yourself to have 3 large meals a day. Instead eat smaller amounts more often.  · Take pain medicines with a small amount of  solid food, such as crackers or toast, to avoid nausea.     Call your surgeon if  · You still have pain an hour after taking medicine. The medicine may not be strong enough.  · You feel too sleepy, dizzy, or groggy. The medicine may be too strong.  · You have side effects like nausea, vomiting, or skin changes, such as rash, itching, or hives.       If you have obstructive sleep apnea  You were given anesthesia medicine during surgery to keep you comfortable and free of pain. After surgery, you may have more apnea spells because of this medicine and other medicines you were given. The spells may last longer than usual.   At home:  · Keep using the continuous positive airway pressure (CPAP) device when you sleep. Unless your healthcare provider tells you not to, use it when you sleep, day or night. CPAP is a common device used to treat obstructive sleep apnea.  · Talk with your provider before taking any pain medicine, muscle relaxants, or sedatives. Your provider will tell you about the possible dangers of taking these medicines.  Date Last Reviewed: 12/1/2016  © 8648-9074 [employer]. [address] [place] [address]. All rights reserved. This information is not intended as a substitute for professional medical care. Always follow your healthcare professional's instructions.      Keep extremity elevated  Keep ice to surgical site  May remove dressing in 48 hours, then can shower after dressing removed

## 2019-12-27 NOTE — ANESTHESIA POSTPROCEDURE EVALUATION
Anesthesia Post Evaluation    Patient: Karlene Damon    Procedure(s) Performed: Procedure(s) (LRB):  ARTHROSCOPY, KNEE (Right)  CHONDROPLASTY, KNEE (Right)    Final Anesthesia Type: general    Patient location during evaluation: PACU  Patient participation: Yes- Able to Participate  Level of consciousness: awake and alert  Post-procedure vital signs: reviewed and stable  Pain management: adequate  Airway patency: patent  ENDY mitigation strategies: Extubation while patient is awake, Multimodal analgesia and Extubation and recovery carried out in lateral, semiupright, or other nonsupine position  PONV status at discharge: No PONV  Anesthetic complications: no      Cardiovascular status: stable  Respiratory status: unassisted and spontaneous ventilation  Hydration status: euvolemic  Follow-up not needed.          Vitals Value Taken Time   /76 12/27/2019 11:45 AM   Temp 36.3 °C (97.3 °F) 12/27/2019 11:30 AM   Pulse 98 12/27/2019 11:58 AM   Resp 16 12/27/2019 11:57 AM   SpO2 98 % 12/27/2019 11:58 AM   Vitals shown include unvalidated device data.      Event Time     Out of Recovery 11:58:29          Pain/Shayna Score: Pain Rating Prior to Med Admin: 5 (12/27/2019 11:42 AM)  Shayna Score: 10 (12/27/2019 11:30 AM)

## 2019-12-27 NOTE — OP NOTE
[facility]  Surgery Department  Operative Note    SUMMARY     Date of Procedure: 12/27/2019     Procedure: Procedure(s) (LRB):  ARTHROSCOPY, KNEE (Right)  CHONDROPLASTY, KNEE (Right)     Surgeon(s) and Role:     * Dontrell Rodriguez Jr., MD - Primary    Assisting Surgeon: None    Pre-Operative Diagnosis: Chondromalacia of right patella [M22.41]  Loose body of knee, right [M23.41]    Post-Operative Diagnosis: Post-Op Diagnosis Codes:     * Chondromalacia of right patella [M22.41]      Anesthesia: General    Technical Procedures Used:  Chondroplasty patella    Description of the Findings of the Procedure:  No loose body but we did find significant chondromalacia patella details in the brief operative note were dictated  Significant Surgical Tasks Conducted by the Assistant(s), if Applicable managing scoped exposing the knee closing the knee    Complications: No no    Estimated Blood Loss (EBL): * No values recorded between 12/27/2019  9:43 AM and 12/27/2019 10:20 AM *             Specimens:   Specimen (12h ago, onward)    None                  Condition: Good    Disposition: PACU - hemodynamically stable.    Attestation: I was present and scrubbed for the entire procedure.

## 2019-12-27 NOTE — TRANSFER OF CARE
"Anesthesia Transfer of Care Note    Patient: Karlene Damon    Procedure(s) Performed: Procedure(s) (LRB):  ARTHROSCOPY, KNEE (Right)  CHONDROPLASTY, KNEE (Right)    Patient location: PACU    Anesthesia Type: general    Transport from OR: Transported from OR on room air with adequate spontaneous ventilation    Post pain: adequate analgesia    Post assessment: no apparent anesthetic complications    Post vital signs: stable    Level of consciousness: awake and alert    Nausea/Vomiting: no nausea/vomiting    Complications: none    Transfer of care protocol was followed      Last vitals:   Visit Vitals  /81   Pulse 87   Temp 36.6 °C (97.9 °F) (Oral)   Resp 17   Ht 5' 1" (1.549 m)   Wt 72.6 kg (160 lb)   SpO2 100%   Breastfeeding? No   BMI 30.23 kg/m²     "

## 2019-12-31 ENCOUNTER — TELEPHONE (OUTPATIENT)
Dept: ORTHOPEDICS | Facility: CLINIC | Age: 54
End: 2019-12-31

## 2019-12-31 NOTE — TELEPHONE ENCOUNTER
----- Message from Gosia Flores sent at 12/31/2019  9:48 AM CST -----  Contact: Pt  Pt states she is almost out of Rx Codeine/Tylenol 3 and needs a refill,  Pt call # [phone].

## 2020-01-03 RX ORDER — SIMVASTATIN 10 MG/1
10 TABLET, FILM COATED ORAL NIGHTLY
Qty: 90 TABLET | Refills: 1 | Status: SHIPPED | OUTPATIENT
Start: 2020-01-03 | End: 2020-03-17 | Stop reason: SDUPTHER

## 2020-01-08 ENCOUNTER — OFFICE VISIT (OUTPATIENT)
Dept: ORTHOPEDICS | Facility: CLINIC | Age: 55
End: 2020-01-08
Payer: COMMERCIAL

## 2020-01-08 VITALS
SYSTOLIC BLOOD PRESSURE: 120 MMHG | BODY MASS INDEX: 30.21 KG/M2 | DIASTOLIC BLOOD PRESSURE: 81 MMHG | HEIGHT: 61 IN | WEIGHT: 160 LBS | HEART RATE: 113 BPM

## 2020-01-08 DIAGNOSIS — M23.41 LOOSE BODY OF KNEE, RIGHT: ICD-10-CM

## 2020-01-08 DIAGNOSIS — Z98.890 STATUS POST ARTHROSCOPY OF RIGHT KNEE: Primary | ICD-10-CM

## 2020-01-08 DIAGNOSIS — M17.11 PATELLOFEMORAL ARTHRITIS OF RIGHT KNEE: ICD-10-CM

## 2020-01-08 DIAGNOSIS — M22.41 CHONDROMALACIA OF RIGHT PATELLA: ICD-10-CM

## 2020-01-08 PROCEDURE — 99024 PR POST-OP FOLLOW-UP VISIT: ICD-10-PCS | Mod: S$GLB,,, | Performed by: ORTHOPAEDIC SURGERY

## 2020-01-08 PROCEDURE — 99024 POSTOP FOLLOW-UP VISIT: CPT | Mod: S$GLB,,, | Performed by: ORTHOPAEDIC SURGERY

## 2020-01-08 NOTE — PROGRESS NOTES
[facility] POST-OP NOTE    Subjective:           Chief Complaint:   Chief Complaint   Patient presents with    Right Knee - Post-op Evaluation     Right knee scope 12.27.19. Suture removal.  States that her knee does feel better, still swelling.        Past Medical History:   Diagnosis Date    Back spasm 6/7/2018    High cholesterol     Hormone replacement therapy (HRT) 6/7/2018    HTN (hypertension) 6/7/2018    Hyperlipidemia, mixed 6/7/2018    Hypertension     Insomnia 6/7/2018    Rosacea 6/7/2018       Past Surgical History:   Procedure Laterality Date    APPENDECTOMY      ARTHROSCOPY OF KNEE Right 12/27/2019    Procedure: ARTHROSCOPY, KNEE;  Surgeon: Dontrell Rodriguez Jr., MD;  Location: [facility] OR;  Service: Orthopedics;  Laterality: Right;  patella.    BREAST BIOPSY Right 15 yrs ago    benign    BREAST SURGERY Left     benign cyst removal x2    CHONDROPLASTY OF KNEE Right 12/27/2019    Procedure: CHONDROPLASTY, KNEE;  Surgeon: Dontrell Rodriguez Jr., MD;  Location: [facility] OR;  Service: Orthopedics;  Laterality: Right;  patella.    COLONOSCOPY N/A 4/12/2019    Procedure: COLONOSCOPY;  Surgeon: Dennis Rosario MD;  Location: [facility] ENDO;  Service: Endoscopy;  Laterality: N/A;    GALLBLADDER SURGERY      HYSTERECTOMY      nephrolithiasis      PLANTAR FASCIA SURGERY Left     right knee arthroscopy      SHOULDER SURGERY Right     arthroscopy    TOTAL ABDOMINAL HYSTERECTOMY W/ BILATERAL SALPINGOOPHORECTOMY      ovarian cysts       Current Outpatient Medications   Medication Sig    acetaminophen with codeine (TYLENOL-CODEINE #3 ORAL) Take by mouth every 4 (four) hours as needed.    amlodipine-benazepril 10-20mg (LOTREL) 10-20 mg per capsule Take 1 capsule by mouth once daily.    cetirizine (ZYRTEC) 10 MG tablet Take 10 mg by mouth once daily.    cyclobenzaprine (FLEXERIL) 10 MG tablet TAKE 1 TABLET BY MOUTH EVERY DAY IN THE EVENING    doxycycline (ORACEA) 40 mg capsule TAKE 1 CAPSULE BY  MOUTH EVERY DAY    fluticasone (FLONASE) 50 mcg/actuation nasal spray 1 spray by Each Nostril route as needed.     metronidazole 1% (METROGEL) 1 % Gel Apply topically once daily.    naproxen sodium (ANAPROX) 220 MG tablet Take 220 mg by mouth every 12 (twelve) hours.    omega 3-dha-epa-fish oil (FISH OIL) 1,000 mg (120 mg-180 mg) Cap Take 1 capsule by mouth once daily.    PREMARIN 0.625 mg tablet Take 0.625 mg by mouth once daily.     promethazine (PHENERGAN) 25 MG tablet Take 1 tablet (25 mg total) by mouth every 4 (four) hours.    simvastatin (ZOCOR) 10 MG tablet Take 1 tablet (10 mg total) by mouth every evening.    triamterene-hydrochlorothiazide 37.5-25 mg (DYAZIDE) 37.5-25 mg per capsule Take 1 capsule by mouth daily as needed.    zolpidem (AMBIEN) 10 mg Tab 1 (one) time each day in the evening.     No current facility-administered medications for this visit.        Review of patient's allergies indicates:   Allergen Reactions    Hydrocodone Hives    Ciprofloxacin Hives    Tetanus vaccines and toxoid Hives and Swelling     Hives and arm was very swollen--as an adult       Family History   Problem Relation Age of Onset    Hypertension Mother     Hypertension Father     Stroke Father 51    Diabetes type II Father     Polycystic kidney disease Brother     Bone cancer Maternal Aunt     Breast cancer Maternal Aunt     Melanoma Neg Hx     Psoriasis Neg Hx     Lupus Neg Hx     Eczema Neg Hx        Social History     Socioeconomic History    Marital status:      Spouse name: Not on file    Number of children: Not on file    Years of education: Not on file    Highest education level: Not on file   Occupational History    Not on file   Social Needs    Financial resource strain: Not hard at all    Food insecurity:     Worry: Never true     Inability: Never true    Transportation needs:     Medical: No     Non-medical: No   Tobacco Use    Smoking status: Never Smoker    Smokeless  tobacco: Never Used   Substance and Sexual Activity    Alcohol use: Yes     Frequency: Monthly or less     Drinks per session: 1 or 2     Binge frequency: Never     Comment: rare    Drug use: No    Sexual activity: Not Currently   Lifestyle    Physical activity:     Days per week: 2 days     Minutes per session: 30 min    Stress: Not at all   Relationships    Social connections:     Talks on phone: More than three times a week     Gets together: Once a week     Attends Faith service: Not on file     Active member of club or organization: Yes     Attends meetings of clubs or organizations: 1 to 4 times per year     Relationship status:    Other Topics Concern    Are you pregnant or think you may be? Not Asked    Breast-feeding Not Asked   Social History Narrative    Not on file       History of present illness:  I follow up her left knee. We did an arthroscopy.  We looking for loose body but found primarily chondromalacia patella.  Particularly with femoral groove was badly worn.  She has mild swelling.  Minimal limp.  Wounds are doing well      Review of Systems:    Musculoskeletal:  See HPI      Objective:        Physical Examination:    Vital Signs:    Vitals:    01/08/20 0911   BP: 120/81   Pulse: (!) 113       Body mass index is 30.23 kg/m².    This a well-developed, well nourished patient in no acute distress.  They are alert and oriented and cooperative to examination.        She has of flexion past 90.  Good extension.  Mild effusion.  Wounds are fine.  Sutures are out.  Pertinent New Results:    XRAY Report / Interpretation:       Assessment/Plan:   So the plan is start physical therapy in 2 weeks.  Probably would benefit from Visco injection next month.  She does have considerable patellofemoral arthritis. Her job requires a lot of stairs.  She is not fit for duty at this point.  The big question will be later on with full recovery ,,  How does the  kneecap tolerate her job??    This  note was created using Dragon voice recognition software that occasionally misinterpreted phrases or words.

## 2020-02-03 ENCOUNTER — TELEPHONE (OUTPATIENT)
Dept: ORTHOPEDICS | Facility: CLINIC | Age: 55
End: 2020-02-03

## 2020-02-03 ENCOUNTER — OFFICE VISIT (OUTPATIENT)
Dept: ORTHOPEDICS | Facility: CLINIC | Age: 55
End: 2020-02-03
Payer: COMMERCIAL

## 2020-02-03 VITALS
SYSTOLIC BLOOD PRESSURE: 118 MMHG | HEIGHT: 61 IN | WEIGHT: 160 LBS | BODY MASS INDEX: 30.21 KG/M2 | DIASTOLIC BLOOD PRESSURE: 77 MMHG | HEART RATE: 107 BPM

## 2020-02-03 DIAGNOSIS — Z98.890 STATUS POST ARTHROSCOPY OF RIGHT KNEE: Primary | ICD-10-CM

## 2020-02-03 PROCEDURE — 99024 POSTOP FOLLOW-UP VISIT: CPT | Mod: S$GLB,,, | Performed by: ORTHOPAEDIC SURGERY

## 2020-02-03 PROCEDURE — 99024 PR POST-OP FOLLOW-UP VISIT: ICD-10-PCS | Mod: S$GLB,,, | Performed by: ORTHOPAEDIC SURGERY

## 2020-02-03 RX ORDER — ACETAMINOPHEN AND CODEINE PHOSPHATE 300; 30 MG/1; MG/1
1 TABLET ORAL EVERY 6 HOURS PRN
COMMUNITY
Start: 2020-02-03 | End: 2020-02-10

## 2020-02-03 NOTE — PROGRESS NOTES
[facility] POST-OP NOTE    Subjective:           Chief Complaint:   Chief Complaint   Patient presents with    Right Knee - Post-op Evaluation     Right knee scope 12.27.19 States that her knee is feeling a little bit better,still swelling and has pain that is constant, and worse in the evening.        Past Medical History:   Diagnosis Date    Back spasm 6/7/2018    High cholesterol     Hormone replacement therapy (HRT) 6/7/2018    HTN (hypertension) 6/7/2018    Hyperlipidemia, mixed 6/7/2018    Hypertension     Insomnia 6/7/2018    Rosacea 6/7/2018       Past Surgical History:   Procedure Laterality Date    APPENDECTOMY      ARTHROSCOPY OF KNEE Right 12/27/2019    Procedure: ARTHROSCOPY, KNEE;  Surgeon: Dontrell Rodriguez Jr., MD;  Location: [facility] OR;  Service: Orthopedics;  Laterality: Right;  patella.    BREAST BIOPSY Right 15 yrs ago    benign    BREAST SURGERY Left     benign cyst removal x2    CHONDROPLASTY OF KNEE Right 12/27/2019    Procedure: CHONDROPLASTY, KNEE;  Surgeon: Dontrell Rodriguez Jr., MD;  Location: [facility] OR;  Service: Orthopedics;  Laterality: Right;  patella.    COLONOSCOPY N/A 4/12/2019    Procedure: COLONOSCOPY;  Surgeon: Dennis Rosario MD;  Location: [facility];  Service: Endoscopy;  Laterality: N/A;    GALLBLADDER SURGERY      HYSTERECTOMY      nephrolithiasis      PLANTAR FASCIA SURGERY Left     right knee arthroscopy      SHOULDER SURGERY Right     arthroscopy    TOTAL ABDOMINAL HYSTERECTOMY W/ BILATERAL SALPINGOOPHORECTOMY      ovarian cysts       Current Outpatient Medications   Medication Sig    acetaminophen with codeine (TYLENOL-CODEINE #3 ORAL) Take by mouth every 4 (four) hours as needed.    amlodipine-benazepril 10-20mg (LOTREL) 10-20 mg per capsule Take 1 capsule by mouth once daily.    cetirizine (ZYRTEC) 10 MG tablet Take 10 mg by mouth once daily.    cyclobenzaprine (FLEXERIL) 10 MG tablet TAKE 1 TABLET BY MOUTH EVERY DAY IN THE EVENING     doxycycline (ORACEA) 40 mg capsule TAKE 1 CAPSULE BY MOUTH EVERY DAY    fluticasone (FLONASE) 50 mcg/actuation nasal spray 1 spray by Each Nostril route as needed.     metronidazole 1% (METROGEL) 1 % Gel Apply topically once daily.    naproxen sodium (ANAPROX) 220 MG tablet Take 220 mg by mouth every 12 (twelve) hours.    omega 3-dha-epa-fish oil (FISH OIL) 1,000 mg (120 mg-180 mg) Cap Take 1 capsule by mouth once daily.    PREMARIN 0.625 mg tablet Take 0.625 mg by mouth once daily.     promethazine (PHENERGAN) 25 MG tablet Take 1 tablet (25 mg total) by mouth every 4 (four) hours.    simvastatin (ZOCOR) 10 MG tablet Take 1 tablet (10 mg total) by mouth every evening.    triamterene-hydrochlorothiazide 37.5-25 mg (DYAZIDE) 37.5-25 mg per capsule Take 1 capsule by mouth daily as needed.    zolpidem (AMBIEN) 10 mg Tab 1 (one) time each day in the evening.     No current facility-administered medications for this visit.        Review of patient's allergies indicates:   Allergen Reactions    Hydrocodone Hives    Ciprofloxacin Hives    Tetanus vaccines and toxoid Hives and Swelling     Hives and arm was very swollen--as an adult       Family History   Problem Relation Age of Onset    Hypertension Mother     Hypertension Father     Stroke Father 51    Diabetes type II Father     Polycystic kidney disease Brother     Bone cancer Maternal Aunt     Breast cancer Maternal Aunt     Melanoma Neg Hx     Psoriasis Neg Hx     Lupus Neg Hx     Eczema Neg Hx        Social History     Socioeconomic History    Marital status:      Spouse name: Not on file    Number of children: Not on file    Years of education: Not on file    Highest education level: Not on file   Occupational History    Not on file   Social Needs    Financial resource strain: Not hard at all    Food insecurity:     Worry: Never true     Inability: Never true    Transportation needs:     Medical: No     Non-medical: No   Tobacco  Use    Smoking status: Never Smoker    Smokeless tobacco: Never Used   Substance and Sexual Activity    Alcohol use: Yes     Frequency: Monthly or less     Drinks per session: 1 or 2     Binge frequency: Never     Comment: rare    Drug use: No    Sexual activity: Not Currently   Lifestyle    Physical activity:     Days per week: 2 days     Minutes per session: 30 min    Stress: Not at all   Relationships    Social connections:     Talks on phone: More than three times a week     Gets together: Once a week     Attends Synagogue service: Not on file     Active member of club or organization: Yes     Attends meetings of clubs or organizations: 1 to 4 times per year     Relationship status:    Other Topics Concern    Are you pregnant or think you may be? Not Asked    Breast-feeding Not Asked   Social History Narrative    Not on file       History of present illness:  Right knee arthroscopy for primarily chondromalacia patella. She started physical therapy.  She still has some pain in the knee particularly trying to work stairs but even some pain on the flat.  Taking Tylenol 3 for pain.  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    This a well-developed, well nourished patient in no acute distress.  They are alert and oriented and cooperative to examination.        So physical exam shows she has mild quad atrophy.  She has good flexion he has good extension she does not have a big effusion she does have some crepitation around patella is not severe pain but there is some pain manipulating the patella  Pertinent New Results:    XRAY Report / Interpretation:       Assessment/Plan:      So at this point Synvisc would be an option but her insurance does not have a coverage.  But  knee sleeve she is going to get at grocery or drug store.  Continue with anti-inflammatory over-the-counter refill pain medicine continue therapy.  I do not think she is good enough to return to work at see her back in a month.    This note was created using Dragon voice recognition software that occasionally misinterpreted phrases or words.

## 2020-03-02 ENCOUNTER — OFFICE VISIT (OUTPATIENT)
Dept: ORTHOPEDICS | Facility: CLINIC | Age: 55
End: 2020-03-02
Payer: COMMERCIAL

## 2020-03-02 VITALS
HEIGHT: 61 IN | SYSTOLIC BLOOD PRESSURE: 108 MMHG | HEART RATE: 101 BPM | DIASTOLIC BLOOD PRESSURE: 78 MMHG | WEIGHT: 160 LBS | BODY MASS INDEX: 30.21 KG/M2

## 2020-03-02 DIAGNOSIS — Z98.890 STATUS POST ARTHROSCOPY OF RIGHT KNEE: Primary | ICD-10-CM

## 2020-03-02 PROCEDURE — 99024 PR POST-OP FOLLOW-UP VISIT: ICD-10-PCS | Mod: S$GLB,,, | Performed by: ORTHOPAEDIC SURGERY

## 2020-03-02 PROCEDURE — 99024 POSTOP FOLLOW-UP VISIT: CPT | Mod: S$GLB,,, | Performed by: ORTHOPAEDIC SURGERY

## 2020-03-02 NOTE — LETTER
March 2, 2020      [facility]  [address]  [place] [address]  Phone: [phone]  Fax: [phone]       Patient: Karlene Damon   YOB: 1965  Date of Visit: 03/02/2020    To Whom It May Concern:    Ariel Damon  was at [facility] on 03/02/2020. She may return to work on 03/04/2020 with no restrictions. If you have any questions or concerns, or if I can be of further assistance, please do not hesitate to contact me.    Sincerely,    Electronically Signed By: Dontrell Rodriguez M.D.

## 2020-03-02 NOTE — PROGRESS NOTES
[facility] POST-OP NOTE    Subjective:           Chief Complaint:   Chief Complaint   Patient presents with    Right Knee - Post-op Evaluation     Right knee scope 12.27.2019. States that her knee is getting better.       Past Medical History:   Diagnosis Date    Back spasm 6/7/2018    High cholesterol     Hormone replacement therapy (HRT) 6/7/2018    HTN (hypertension) 6/7/2018    Hyperlipidemia, mixed 6/7/2018    Hypertension     Insomnia 6/7/2018    Rosacea 6/7/2018       Past Surgical History:   Procedure Laterality Date    APPENDECTOMY      ARTHROSCOPY OF KNEE Right 12/27/2019    Procedure: ARTHROSCOPY, KNEE;  Surgeon: Dontrell Rodriguez Jr., MD;  Location: [facility];  Service: Orthopedics;  Laterality: Right;  patella.    BREAST BIOPSY Right 15 yrs ago    benign    BREAST SURGERY Left     benign cyst removal x2    CHONDROPLASTY OF KNEE Right 12/27/2019    Procedure: CHONDROPLASTY, KNEE;  Surgeon: Dontrell Rodriguez Jr., MD;  Location: [facility];  Service: Orthopedics;  Laterality: Right;  patella.    COLONOSCOPY N/A 4/12/2019    Procedure: COLONOSCOPY;  Surgeon: Dennis Rosario MD;  Location: [facility];  Service: Endoscopy;  Laterality: N/A;    GALLBLADDER SURGERY      HYSTERECTOMY      nephrolithiasis      PLANTAR FASCIA SURGERY Left     right knee arthroscopy      SHOULDER SURGERY Right     arthroscopy    TOTAL ABDOMINAL HYSTERECTOMY W/ BILATERAL SALPINGOOPHORECTOMY      ovarian cysts       Current Outpatient Medications   Medication Sig    amlodipine-benazepril 10-20mg (LOTREL) 10-20 mg per capsule Take 1 capsule by mouth once daily.    cetirizine (ZYRTEC) 10 MG tablet Take 10 mg by mouth once daily.    cyclobenzaprine (FLEXERIL) 10 MG tablet TAKE 1 TABLET BY MOUTH EVERY DAY IN THE EVENING    doxycycline (ORACEA) 40 mg capsule TAKE 1 CAPSULE BY MOUTH EVERY DAY    fluticasone (FLONASE) 50 mcg/actuation nasal spray 1 spray by Each Nostril route as needed.     metronidazole 1%  (METROGEL) 1 % Gel Apply topically once daily.    naproxen sodium (ANAPROX) 220 MG tablet Take 220 mg by mouth every 12 (twelve) hours.    omega 3-dha-epa-fish oil (FISH OIL) 1,000 mg (120 mg-180 mg) Cap Take 1 capsule by mouth once daily.    PREMARIN 0.625 mg tablet Take 0.625 mg by mouth once daily.     simvastatin (ZOCOR) 10 MG tablet Take 1 tablet (10 mg total) by mouth every evening.    triamterene-hydrochlorothiazide 37.5-25 mg (DYAZIDE) 37.5-25 mg per capsule Take 1 capsule by mouth daily as needed.    zolpidem (AMBIEN) 10 mg Tab 1 (one) time each day in the evening.    acetaminophen with codeine (TYLENOL-CODEINE #3 ORAL) Take by mouth every 4 (four) hours as needed.     No current facility-administered medications for this visit.        Review of patient's allergies indicates:   Allergen Reactions    Hydrocodone Hives    Ciprofloxacin Hives    Tetanus vaccines and toxoid Hives and Swelling     Hives and arm was very swollen--as an adult       Family History   Problem Relation Age of Onset    Hypertension Mother     Hypertension Father     Stroke Father 51    Diabetes type II Father     Polycystic kidney disease Brother     Bone cancer Maternal Aunt     Breast cancer Maternal Aunt     Melanoma Neg Hx     Psoriasis Neg Hx     Lupus Neg Hx     Eczema Neg Hx        Social History     Socioeconomic History    Marital status:      Spouse name: Not on file    Number of children: Not on file    Years of education: Not on file    Highest education level: Not on file   Occupational History    Not on file   Social Needs    Financial resource strain: Not hard at all    Food insecurity:     Worry: Never true     Inability: Never true    Transportation needs:     Medical: No     Non-medical: No   Tobacco Use    Smoking status: Never Smoker    Smokeless tobacco: Never Used   Substance and Sexual Activity    Alcohol use: Yes     Frequency: Monthly or less     Drinks per session: 1 or 2      Binge frequency: Never     Comment: rare    Drug use: No    Sexual activity: Not Currently   Lifestyle    Physical activity:     Days per week: 2 days     Minutes per session: 30 min    Stress: Not at all   Relationships    Social connections:     Talks on phone: More than three times a week     Gets together: Once a week     Attends [employer] service: Not on file     Active member of club or organization: Yes     Attends meetings of clubs or organizations: 1 to 4 times per year     Relationship status:    Other Topics Concern    Are you pregnant or think you may be? Not Asked    Breast-feeding Not Asked   Social History Narrative    Not on file       History of present illness:  Follow-up right knee arthroscopy.  She had the chondroplasty patella.  She is now more than 2 months.  She is doing reasonably well particularly on flat ground.  She does have a patellar brace.  She takes some Aleve.  Review of Systems:    Musculoskeletal:  See HPI      Objective:        Physical Examination:    Vital Signs:    Vitals:    03/02/20 1005   BP: 108/78   Pulse: 101       Body mass index is 30.23 kg/m².    This a well-developed, well nourished patient in no acute distress.  They are alert and oriented and cooperative to examination.        Physical exam shows good range of motion right knee.  Does not have much crepitation or tenderness at all around the patella.  Minimal quad atrophy. No limp.  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  She is on anti-inflammatories has a patellar brace.  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  I think that is reasonable.  She may have some flare ups with the stairs but think can probably function at work adequately so will return to work later this week.  If she has flare-ups she will see us back.  Visco may or may not  be of much good with chondromalacia patella.  She did not have degenerative changes in other port portion of her knee. We will see her back p.r.n.    This note was created using Dragon voice recognition software that occasionally misinterpreted phrases or words.

## 2020-03-11 DIAGNOSIS — L71.9 ROSACEA: ICD-10-CM

## 2020-03-11 RX ORDER — DOXYCYCLINE 40 MG/1
CAPSULE ORAL
Qty: 90 CAPSULE | Refills: 1 | Status: SHIPPED | OUTPATIENT
Start: 2020-03-11 | End: 2020-10-06

## 2020-03-17 ENCOUNTER — LAB VISIT (OUTPATIENT)
Dept: LAB | Facility: HOSPITAL | Age: 55
End: 2020-03-17
Attending: FAMILY MEDICINE
Payer: COMMERCIAL

## 2020-03-17 ENCOUNTER — OFFICE VISIT (OUTPATIENT)
Dept: FAMILY MEDICINE | Facility: CLINIC | Age: 55
End: 2020-03-17
Payer: COMMERCIAL

## 2020-03-17 VITALS
WEIGHT: 164.44 LBS | BODY MASS INDEX: 31.05 KG/M2 | TEMPERATURE: 98 F | SYSTOLIC BLOOD PRESSURE: 128 MMHG | HEART RATE: 101 BPM | OXYGEN SATURATION: 95 % | DIASTOLIC BLOOD PRESSURE: 74 MMHG | HEIGHT: 61 IN

## 2020-03-17 DIAGNOSIS — Z23 IMMUNIZATION DUE: ICD-10-CM

## 2020-03-17 DIAGNOSIS — G47.00 INSOMNIA, UNSPECIFIED TYPE: Primary | ICD-10-CM

## 2020-03-17 DIAGNOSIS — E78.5 HYPERLIPIDEMIA, UNSPECIFIED HYPERLIPIDEMIA TYPE: ICD-10-CM

## 2020-03-17 DIAGNOSIS — I10 ESSENTIAL HYPERTENSION: ICD-10-CM

## 2020-03-17 LAB
ALBUMIN/CREAT UR: NORMAL UG/MG (ref 0–30)
CREAT UR-MCNC: 57 MG/DL (ref 15–325)
MICROALBUMIN UR DL<=1MG/L-MCNC: <2.5 UG/ML

## 2020-03-17 PROCEDURE — 3074F SYST BP LT 130 MM HG: CPT | Mod: CPTII,S$GLB,, | Performed by: FAMILY MEDICINE

## 2020-03-17 PROCEDURE — 3008F PR BODY MASS INDEX (BMI) DOCUMENTED: ICD-10-PCS | Mod: CPTII,S$GLB,, | Performed by: FAMILY MEDICINE

## 2020-03-17 PROCEDURE — 90750 ZOSTER RECOMBINANT VACCINE: ICD-10-PCS | Mod: S$GLB,,, | Performed by: FAMILY MEDICINE

## 2020-03-17 PROCEDURE — 3078F PR MOST RECENT DIASTOLIC BLOOD PRESSURE < 80 MM HG: ICD-10-PCS | Mod: CPTII,S$GLB,, | Performed by: FAMILY MEDICINE

## 2020-03-17 PROCEDURE — 3078F DIAST BP <80 MM HG: CPT | Mod: CPTII,S$GLB,, | Performed by: FAMILY MEDICINE

## 2020-03-17 PROCEDURE — 99999 PR PBB SHADOW E&M-EST. PATIENT-LVL III: CPT | Mod: PBBFAC,,, | Performed by: FAMILY MEDICINE

## 2020-03-17 PROCEDURE — 99214 PR OFFICE/OUTPT VISIT, EST, LEVL IV, 30-39 MIN: ICD-10-PCS | Mod: 25,S$GLB,, | Performed by: FAMILY MEDICINE

## 2020-03-17 PROCEDURE — 90471 IMMUNIZATION ADMIN: CPT | Mod: S$GLB,,, | Performed by: FAMILY MEDICINE

## 2020-03-17 PROCEDURE — 99999 PR PBB SHADOW E&M-EST. PATIENT-LVL III: ICD-10-PCS | Mod: PBBFAC,,, | Performed by: FAMILY MEDICINE

## 2020-03-17 PROCEDURE — 90750 HZV VACC RECOMBINANT IM: CPT | Mod: S$GLB,,, | Performed by: FAMILY MEDICINE

## 2020-03-17 PROCEDURE — 90471 ZOSTER RECOMBINANT VACCINE: ICD-10-PCS | Mod: S$GLB,,, | Performed by: FAMILY MEDICINE

## 2020-03-17 PROCEDURE — 3074F PR MOST RECENT SYSTOLIC BLOOD PRESSURE < 130 MM HG: ICD-10-PCS | Mod: CPTII,S$GLB,, | Performed by: FAMILY MEDICINE

## 2020-03-17 PROCEDURE — 99214 OFFICE O/P EST MOD 30 MIN: CPT | Mod: 25,S$GLB,, | Performed by: FAMILY MEDICINE

## 2020-03-17 PROCEDURE — 82043 UR ALBUMIN QUANTITATIVE: CPT

## 2020-03-17 PROCEDURE — 3008F BODY MASS INDEX DOCD: CPT | Mod: CPTII,S$GLB,, | Performed by: FAMILY MEDICINE

## 2020-03-17 RX ORDER — SIMVASTATIN 10 MG/1
10 TABLET, FILM COATED ORAL NIGHTLY
Qty: 90 TABLET | Refills: 1 | Status: SHIPPED | OUTPATIENT
Start: 2020-03-17 | End: 2020-09-11

## 2020-03-17 RX ORDER — ZOLPIDEM TARTRATE 10 MG/1
10 TABLET ORAL NIGHTLY PRN
Qty: 90 TABLET | Refills: 1 | Status: SHIPPED | OUTPATIENT
Start: 2020-03-17 | End: 2020-09-04 | Stop reason: SDUPTHER

## 2020-03-17 NOTE — PROGRESS NOTES
Patient verified by name and . Patient received Shingrix 2/2 in L deltoid. Patient tolerated injection well. Patient advised to wait in clinic for 15 minutes in case of adverse reactions. Patient verbalized understanding.

## 2020-03-17 NOTE — PROGRESS NOTES
Subjective:       Patient ID: Karlene Damon is a 54 y.o. female.    Chief Complaint: Follow-up    HPI    Presents to clinic have refills on her Ambien.  Has been taking this medication for years for her insomnia. Has no complaints with this medication.    Past Medical History:   Diagnosis Date    Back spasm 6/7/2018    High cholesterol     Hormone replacement therapy (HRT) 6/7/2018    HTN (hypertension) 6/7/2018    Hyperlipidemia, mixed 6/7/2018    Hypertension     Insomnia 6/7/2018    Rosacea 6/7/2018       Past Surgical History:   Procedure Laterality Date    APPENDECTOMY      ARTHROSCOPY OF KNEE Right 12/27/2019    Procedure: ARTHROSCOPY, KNEE;  Surgeon: Dontrell Rodriguez Jr., MD;  Location: [facility] OR;  Service: Orthopedics;  Laterality: Right;  patella.    BREAST BIOPSY Right 15 yrs ago    benign    BREAST SURGERY Left     benign cyst removal x2    CHONDROPLASTY OF KNEE Right 12/27/2019    Procedure: CHONDROPLASTY, KNEE;  Surgeon: Dontrell Rodriguez Jr., MD;  Location: [facility] OR;  Service: Orthopedics;  Laterality: Right;  patella.    COLONOSCOPY N/A 4/12/2019    Procedure: COLONOSCOPY;  Surgeon: Dennis Rosario MD;  Location: [facility];  Service: Endoscopy;  Laterality: N/A;    GALLBLADDER SURGERY      HYSTERECTOMY      nephrolithiasis      PLANTAR FASCIA SURGERY Left     right knee arthroscopy      SHOULDER SURGERY Right     arthroscopy    TOTAL ABDOMINAL HYSTERECTOMY W/ BILATERAL SALPINGOOPHORECTOMY      ovarian cysts       Family History   Problem Relation Age of Onset    Hypertension Mother     Hypertension Father     Stroke Father 51    Diabetes type II Father     Polycystic kidney disease Brother     Bone cancer Maternal Aunt     Breast cancer Maternal Aunt     Melanoma Neg Hx     Psoriasis Neg Hx     Lupus Neg Hx     Eczema Neg Hx        Social History     Tobacco Use    Smoking status: Never Smoker    Smokeless tobacco: Never Used   Substance Use Topics    Alcohol use:  Yes     Frequency: Monthly or less     Drinks per session: 1 or 2     Binge frequency: Never     Comment: rare    Drug use: No       Social History     Substance and Sexual Activity   Sexual Activity Not Currently          Current Outpatient Medications:     acetaminophen with codeine (TYLENOL-CODEINE #3 ORAL), Take by mouth every 4 (four) hours as needed., Disp: , Rfl:     amlodipine-benazepril 10-20mg (LOTREL) 10-20 mg per capsule, Take 1 capsule by mouth once daily., Disp: 90 capsule, Rfl: 1    cetirizine (ZYRTEC) 10 MG tablet, Take 10 mg by mouth once daily., Disp: , Rfl:     cyclobenzaprine (FLEXERIL) 10 MG tablet, TAKE 1 TABLET BY MOUTH EVERY DAY IN THE EVENING, Disp: 90 tablet, Rfl: 1    doxycycline (ORACEA) 40 mg capsule, TAKE 1 CAPSULE BY MOUTH EVERY DAY, Disp: 90 capsule, Rfl: 1    fluticasone (FLONASE) 50 mcg/actuation nasal spray, 1 spray by Each Nostril route as needed. , Disp: , Rfl:     metronidazole 1% (METROGEL) 1 % Gel, Apply topically once daily., Disp: 60 g, Rfl: 11    naproxen sodium (ANAPROX) 220 MG tablet, Take 220 mg by mouth every 12 (twelve) hours., Disp: , Rfl:     omega 3-dha-epa-fish oil (FISH OIL) 1,000 mg (120 mg-180 mg) Cap, Take 1 capsule by mouth once daily., Disp: , Rfl:     simvastatin (ZOCOR) 10 MG tablet, Take 1 tablet (10 mg total) by mouth every evening., Disp: 90 tablet, Rfl: 1    triamterene-hydrochlorothiazide 37.5-25 mg (DYAZIDE) 37.5-25 mg per capsule, Take 1 capsule by mouth daily as needed., Disp: 90 capsule, Rfl: 3    zolpidem (AMBIEN) 10 mg Tab, 1 (one) time each day in the evening., Disp: , Rfl:     PREMARIN 0.625 mg tablet, Take 0.625 mg by mouth once daily. , Disp: , Rfl:      Review of patient's allergies indicates:   Allergen Reactions    Hydrocodone Hives    Ciprofloxacin Hives    Tetanus vaccines and toxoid Hives and Swelling     Hives and arm was very swollen--as an adult            Review of Systems   Constitutional: Negative for  "activity change and unexpected weight change.   HENT: Negative for hearing loss, rhinorrhea and trouble swallowing.    Eyes: Negative for discharge and visual disturbance.   Respiratory: Negative for chest tightness and wheezing.    Cardiovascular: Negative for chest pain and palpitations.   Gastrointestinal: Negative for blood in stool, constipation, diarrhea and vomiting.   Endocrine: Negative for polydipsia and polyuria.   Genitourinary: Negative for difficulty urinating, dysuria, hematuria and menstrual problem.   Musculoskeletal: Negative for neck pain.   Neurological: Negative for weakness and headaches.   Psychiatric/Behavioral: Negative for confusion and dysphoric mood.           Objective:          Vitals:    03/17/20 1619   BP: 128/74   Pulse: 101   Temp: 98.3 °F (36.8 °C)   SpO2: 95%   Weight: 74.6 kg (164 lb 7.4 oz)   Height: 5' 1" (1.549 m)       Physical Exam   Constitutional: She appears well-developed and well-nourished.   HENT:   Head: Normocephalic and atraumatic.   Eyes: Conjunctivae are normal.   Cardiovascular: Normal rate, regular rhythm and normal heart sounds.   Pulmonary/Chest: Effort normal and breath sounds normal.   Abdominal: Soft. Bowel sounds are normal.   Musculoskeletal: Normal range of motion.   Neurological: She is alert.   Skin: Skin is warm.   Psychiatric: She has a normal mood and affect. Her behavior is normal.   Vitals reviewed.              Assessment/Plan     Karlene was seen today for follow-up.    Diagnoses and all orders for this visit:    Insomnia, unspecified type  -     zolpidem (AMBIEN) 10 mg Tab; Take 1 tablet (10 mg total) by mouth nightly as needed.    Hyperlipidemia, unspecified hyperlipidemia type  -     simvastatin (ZOCOR) 10 MG tablet; Take 1 tablet (10 mg total) by mouth every evening.    Essential hypertension  -     Microalbumin/creatinine urine ratio; Future    Immunization due  -     (In Office Administered) Zoster Recombinant Vaccine          Follow up in " about 6 months (around 9/17/2020) for wellness.    Future Appointments   Date Time Provider Department Center   9/4/2020  9:00 AM Jess Lr MD [facility] [place]         Jess Lr MD  [facility]

## 2020-03-20 ENCOUNTER — TELEPHONE (OUTPATIENT)
Dept: FAMILY MEDICINE | Facility: CLINIC | Age: 55
End: 2020-03-20

## 2020-03-20 NOTE — TELEPHONE ENCOUNTER
----- Message from Sugey Rowley sent at 3/20/2020 10:16 AM CDT -----  Contact: pt  Type:  Patient Returning Call    Who Called:  Pt  Who Left Message for Patient: Lurdes  Does the patient know what this is regarding?: test results  Best Call Back Number:  [phone]  Additional Information:

## 2020-05-05 ENCOUNTER — PATIENT MESSAGE (OUTPATIENT)
Dept: ADMINISTRATIVE | Facility: HOSPITAL | Age: 55
End: 2020-05-05

## 2020-05-05 RX ORDER — AMLODIPINE AND BENAZEPRIL HYDROCHLORIDE 10; 20 MG/1; MG/1
CAPSULE ORAL
Qty: 90 CAPSULE | Refills: 1 | Status: SHIPPED | OUTPATIENT
Start: 2020-05-05 | End: 2020-10-25

## 2020-05-05 RX ORDER — CYCLOBENZAPRINE HCL 10 MG
TABLET ORAL
Qty: 90 TABLET | Refills: 1 | Status: SHIPPED | OUTPATIENT
Start: 2020-05-05 | End: 2020-11-02

## 2020-08-28 DIAGNOSIS — Z12.39 BREAST CANCER SCREENING: ICD-10-CM

## 2020-09-04 ENCOUNTER — OFFICE VISIT (OUTPATIENT)
Dept: FAMILY MEDICINE | Facility: CLINIC | Age: 55
End: 2020-09-04
Payer: COMMERCIAL

## 2020-09-04 ENCOUNTER — LAB VISIT (OUTPATIENT)
Dept: LAB | Facility: HOSPITAL | Age: 55
End: 2020-09-04
Attending: FAMILY MEDICINE
Payer: COMMERCIAL

## 2020-09-04 VITALS
BODY MASS INDEX: 29.55 KG/M2 | TEMPERATURE: 97 F | WEIGHT: 156.5 LBS | HEIGHT: 61 IN | HEART RATE: 104 BPM | SYSTOLIC BLOOD PRESSURE: 136 MMHG | OXYGEN SATURATION: 98 % | DIASTOLIC BLOOD PRESSURE: 70 MMHG

## 2020-09-04 DIAGNOSIS — R25.2 LEG CRAMPS: ICD-10-CM

## 2020-09-04 DIAGNOSIS — E78.5 HYPERLIPIDEMIA, UNSPECIFIED HYPERLIPIDEMIA TYPE: ICD-10-CM

## 2020-09-04 DIAGNOSIS — G47.00 INSOMNIA, UNSPECIFIED TYPE: ICD-10-CM

## 2020-09-04 DIAGNOSIS — I10 ESSENTIAL HYPERTENSION: ICD-10-CM

## 2020-09-04 DIAGNOSIS — Z12.31 BREAST CANCER SCREENING BY MAMMOGRAM: ICD-10-CM

## 2020-09-04 DIAGNOSIS — Z00.00 WELLNESS EXAMINATION: Primary | ICD-10-CM

## 2020-09-04 LAB
ALBUMIN SERPL BCP-MCNC: 4.2 G/DL (ref 3.5–5.2)
ALP SERPL-CCNC: 77 U/L (ref 55–135)
ALT SERPL W/O P-5'-P-CCNC: 26 U/L (ref 10–44)
ANION GAP SERPL CALC-SCNC: 8 MMOL/L (ref 8–16)
AST SERPL-CCNC: 23 U/L (ref 10–40)
BASOPHILS # BLD AUTO: 0.06 K/UL (ref 0–0.2)
BASOPHILS NFR BLD: 0.8 % (ref 0–1.9)
BILIRUB SERPL-MCNC: 0.3 MG/DL (ref 0.1–1)
BUN SERPL-MCNC: 17 MG/DL (ref 6–20)
CALCIUM SERPL-MCNC: 9.7 MG/DL (ref 8.7–10.5)
CHLORIDE SERPL-SCNC: 105 MMOL/L (ref 95–110)
CHOLEST SERPL-MCNC: 163 MG/DL (ref 120–199)
CHOLEST/HDLC SERPL: 3.6 {RATIO} (ref 2–5)
CK SERPL-CCNC: 101 U/L (ref 20–180)
CO2 SERPL-SCNC: 26 MMOL/L (ref 23–29)
CREAT SERPL-MCNC: 0.9 MG/DL (ref 0.5–1.4)
DIFFERENTIAL METHOD: ABNORMAL
EOSINOPHIL # BLD AUTO: 0.1 K/UL (ref 0–0.5)
EOSINOPHIL NFR BLD: 1.8 % (ref 0–8)
ERYTHROCYTE [DISTWIDTH] IN BLOOD BY AUTOMATED COUNT: 12.4 % (ref 11.5–14.5)
EST. GFR  (AFRICAN AMERICAN): >60 ML/MIN/1.73 M^2
EST. GFR  (NON AFRICAN AMERICAN): >60 ML/MIN/1.73 M^2
GLUCOSE SERPL-MCNC: 97 MG/DL (ref 70–110)
HCT VFR BLD AUTO: 40.9 % (ref 37–48.5)
HDLC SERPL-MCNC: 45 MG/DL (ref 40–75)
HDLC SERPL: 27.6 % (ref 20–50)
HGB BLD-MCNC: 12.7 G/DL (ref 12–16)
IMM GRANULOCYTES # BLD AUTO: 0.02 K/UL (ref 0–0.04)
IMM GRANULOCYTES NFR BLD AUTO: 0.3 % (ref 0–0.5)
LDLC SERPL CALC-MCNC: 97 MG/DL (ref 63–159)
LYMPHOCYTES # BLD AUTO: 2.1 K/UL (ref 1–4.8)
LYMPHOCYTES NFR BLD: 26.9 % (ref 18–48)
MAGNESIUM SERPL-MCNC: 2 MG/DL (ref 1.6–2.6)
MCH RBC QN AUTO: 29.3 PG (ref 27–31)
MCHC RBC AUTO-ENTMCNC: 31.1 G/DL (ref 32–36)
MCV RBC AUTO: 95 FL (ref 82–98)
MONOCYTES # BLD AUTO: 0.7 K/UL (ref 0.3–1)
MONOCYTES NFR BLD: 9.3 % (ref 4–15)
NEUTROPHILS # BLD AUTO: 4.6 K/UL (ref 1.8–7.7)
NEUTROPHILS NFR BLD: 60.9 % (ref 38–73)
NONHDLC SERPL-MCNC: 118 MG/DL
NRBC BLD-RTO: 0 /100 WBC
PLATELET # BLD AUTO: 364 K/UL (ref 150–350)
PMV BLD AUTO: 10.3 FL (ref 9.2–12.9)
POTASSIUM SERPL-SCNC: 4.8 MMOL/L (ref 3.5–5.1)
PROT SERPL-MCNC: 7.4 G/DL (ref 6–8.4)
RBC # BLD AUTO: 4.33 M/UL (ref 4–5.4)
SODIUM SERPL-SCNC: 139 MMOL/L (ref 136–145)
TRIGL SERPL-MCNC: 105 MG/DL (ref 30–150)
WBC # BLD AUTO: 7.61 K/UL (ref 3.9–12.7)

## 2020-09-04 PROCEDURE — 3078F PR MOST RECENT DIASTOLIC BLOOD PRESSURE < 80 MM HG: ICD-10-PCS | Mod: CPTII,S$GLB,, | Performed by: FAMILY MEDICINE

## 2020-09-04 PROCEDURE — 82550 ASSAY OF CK (CPK): CPT

## 2020-09-04 PROCEDURE — 83735 ASSAY OF MAGNESIUM: CPT

## 2020-09-04 PROCEDURE — 3075F SYST BP GE 130 - 139MM HG: CPT | Mod: CPTII,S$GLB,, | Performed by: FAMILY MEDICINE

## 2020-09-04 PROCEDURE — 3075F PR MOST RECENT SYSTOLIC BLOOD PRESS GE 130-139MM HG: ICD-10-PCS | Mod: CPTII,S$GLB,, | Performed by: FAMILY MEDICINE

## 2020-09-04 PROCEDURE — 3078F DIAST BP <80 MM HG: CPT | Mod: CPTII,S$GLB,, | Performed by: FAMILY MEDICINE

## 2020-09-04 PROCEDURE — 80053 COMPREHEN METABOLIC PANEL: CPT

## 2020-09-04 PROCEDURE — 99999 PR PBB SHADOW E&M-EST. PATIENT-LVL IV: CPT | Mod: PBBFAC,,, | Performed by: FAMILY MEDICINE

## 2020-09-04 PROCEDURE — 80061 LIPID PANEL: CPT

## 2020-09-04 PROCEDURE — 3008F PR BODY MASS INDEX (BMI) DOCUMENTED: ICD-10-PCS | Mod: CPTII,S$GLB,, | Performed by: FAMILY MEDICINE

## 2020-09-04 PROCEDURE — 99396 PREV VISIT EST AGE 40-64: CPT | Mod: S$GLB,,, | Performed by: FAMILY MEDICINE

## 2020-09-04 PROCEDURE — 99396 PR PREVENTIVE VISIT,EST,40-64: ICD-10-PCS | Mod: S$GLB,,, | Performed by: FAMILY MEDICINE

## 2020-09-04 PROCEDURE — 85025 COMPLETE CBC W/AUTO DIFF WBC: CPT

## 2020-09-04 PROCEDURE — 99999 PR PBB SHADOW E&M-EST. PATIENT-LVL IV: ICD-10-PCS | Mod: PBBFAC,,, | Performed by: FAMILY MEDICINE

## 2020-09-04 PROCEDURE — 36415 COLL VENOUS BLD VENIPUNCTURE: CPT | Mod: PO

## 2020-09-04 PROCEDURE — 3008F BODY MASS INDEX DOCD: CPT | Mod: CPTII,S$GLB,, | Performed by: FAMILY MEDICINE

## 2020-09-04 RX ORDER — ZOLPIDEM TARTRATE 10 MG/1
10 TABLET ORAL NIGHTLY PRN
Qty: 90 TABLET | Refills: 1 | Status: SHIPPED | OUTPATIENT
Start: 2020-09-04 | End: 2021-03-19 | Stop reason: SDUPTHER

## 2020-09-04 RX ORDER — MAGNESIUM 250 MG
250 TABLET ORAL DAILY
COMMUNITY

## 2020-09-04 NOTE — PROGRESS NOTES
Subjective:       Patient ID: Karlene Damon is a 55 y.o. female.    Chief Complaint: Annual Exam    HPI    Presents to clinic for wellness.     Exercise: Does yard work. Sometimes gets a little short of breath while walking up three flights of stairs (but this only lasts for a few second).     Diet: Junk food. Not healthy. Likes pork chops. Trying to add more chicken salad.     Has been having some leg cramps. Improving with mg and potassium. Drinking more water.     Past Medical History:   Diagnosis Date    Back spasm 6/7/2018    High cholesterol     Hormone replacement therapy (HRT) 6/7/2018    HTN (hypertension) 6/7/2018    Hyperlipidemia, mixed 6/7/2018    Hypertension     Insomnia 6/7/2018    Rosacea 6/7/2018       Past Surgical History:   Procedure Laterality Date    APPENDECTOMY      ARTHROSCOPY OF KNEE Right 12/27/2019    Procedure: ARTHROSCOPY, KNEE;  Surgeon: Dontrell Rodriguez Jr., MD;  Location: [facility] OR;  Service: Orthopedics;  Laterality: Right;  patella.    BREAST BIOPSY Right 15 yrs ago    benign    BREAST SURGERY Left     benign cyst removal x2    CHONDROPLASTY OF KNEE Right 12/27/2019    Procedure: CHONDROPLASTY, KNEE;  Surgeon: Dontrell Rodriguez Jr., MD;  Location: [facility] OR;  Service: Orthopedics;  Laterality: Right;  patella.    COLONOSCOPY N/A 4/12/2019    Procedure: COLONOSCOPY;  Surgeon: Dennis Rosario MD;  Location: [facility];  Service: Endoscopy;  Laterality: N/A;    GALLBLADDER SURGERY      HYSTERECTOMY      nephrolithiasis      PLANTAR FASCIA SURGERY Left     right knee arthroscopy      SHOULDER SURGERY Right     arthroscopy    TOTAL ABDOMINAL HYSTERECTOMY W/ BILATERAL SALPINGOOPHORECTOMY      ovarian cysts       Family History   Problem Relation Age of Onset    Hypertension Mother     Hypertension Father     Stroke Father 51    Diabetes type II Father     Polycystic kidney disease Brother     Bone cancer Maternal Aunt     Breast cancer Maternal Aunt      Melanoma Neg Hx     Psoriasis Neg Hx     Lupus Neg Hx     Eczema Neg Hx        Social History     Tobacco Use    Smoking status: Never Smoker    Smokeless tobacco: Never Used   Substance Use Topics    Alcohol use: Yes     Frequency: Monthly or less     Drinks per session: 1 or 2     Binge frequency: Never     Comment: rare    Drug use: No       Social History     Substance and Sexual Activity   Sexual Activity Not Currently          Current Outpatient Medications:     amlodipine-benazepril 10-20mg (LOTREL) 10-20 mg per capsule, TAKE 1 CAPSULE BY MOUTH EVERY DAY, Disp: 90 capsule, Rfl: 1    cetirizine (ZYRTEC) 10 MG tablet, Take 10 mg by mouth once daily., Disp: , Rfl:     cyclobenzaprine (FLEXERIL) 10 MG tablet, TAKE 1 TABLET BY MOUTH EVERY DAY IN THE EVENING, Disp: 90 tablet, Rfl: 1    doxycycline (ORACEA) 40 mg capsule, TAKE 1 CAPSULE BY MOUTH EVERY DAY, Disp: 90 capsule, Rfl: 1    fluticasone (FLONASE) 50 mcg/actuation nasal spray, 1 spray by Each Nostril route as needed. , Disp: , Rfl:     magnesium 250 mg Tab, Take 250 mg by mouth once daily., Disp: , Rfl:     metronidazole 1% (METROGEL) 1 % Gel, Apply topically once daily., Disp: 60 g, Rfl: 11    naproxen sodium (ANAPROX) 220 MG tablet, Take 220 mg by mouth every 12 (twelve) hours., Disp: , Rfl:     omega 3-dha-epa-fish oil (FISH OIL) 1,000 mg (120 mg-180 mg) Cap, Take 1 capsule by mouth once daily., Disp: , Rfl:     potassium 99 mg Tab, Take 2 tablets by mouth once daily., Disp: , Rfl:     simvastatin (ZOCOR) 10 MG tablet, Take 1 tablet (10 mg total) by mouth every evening., Disp: 90 tablet, Rfl: 1    triamterene-hydrochlorothiazide 37.5-25 mg (DYAZIDE) 37.5-25 mg per capsule, Take 1 capsule by mouth daily as needed., Disp: 90 capsule, Rfl: 3    zolpidem (AMBIEN) 10 mg Tab, Take 1 tablet (10 mg total) by mouth nightly as needed., Disp: 90 tablet, Rfl: 1    acetaminophen with codeine (TYLENOL-CODEINE #3 ORAL), Take by mouth every 4 (four)  "hours as needed., Disp: , Rfl:     docosahexaenoic acid-epa 120-180 mg Cap, Take 1 capsule by mouth once daily., Disp: , Rfl:     PREMARIN 0.625 mg tablet, Take 0.625 mg by mouth once daily. , Disp: , Rfl:      Review of patient's allergies indicates:   Allergen Reactions    Hydrocodone Hives    Ciprofloxacin Hives    Tetanus vaccines and toxoid Hives and Swelling     Hives and arm was very swollen--as an adult            Review of Systems   Constitutional: Negative for activity change and unexpected weight change.   HENT: Negative for hearing loss, rhinorrhea and trouble swallowing.    Eyes: Negative for discharge and visual disturbance.   Respiratory: Negative for chest tightness and wheezing.    Cardiovascular: Negative for chest pain and palpitations.   Gastrointestinal: Negative for blood in stool, constipation, diarrhea and vomiting.   Endocrine: Negative for polydipsia and polyuria.   Genitourinary: Negative for difficulty urinating, dysuria, hematuria and menstrual problem.   Musculoskeletal: Negative for joint swelling and neck pain.   Neurological: Negative for weakness and headaches.   Psychiatric/Behavioral: Negative for confusion and dysphoric mood.           Objective:          Vitals:    09/04/20 0906   BP: 136/70   Pulse: 104   Temp: 97.2 °F (36.2 °C)   TempSrc: Temporal   SpO2: 98%   Weight: 71 kg (156 lb 8.4 oz)   Height: 5' 1" (1.549 m)       Physical Exam  Vitals signs reviewed.   Constitutional:       General: She is not in acute distress.     Appearance: Normal appearance. She is well-developed.   HENT:      Head: Normocephalic and atraumatic.      Right Ear: Tympanic membrane and external ear normal.      Left Ear: Tympanic membrane and external ear normal.   Eyes:      Conjunctiva/sclera: Conjunctivae normal.   Cardiovascular:      Rate and Rhythm: Normal rate and regular rhythm.      Pulses: Normal pulses.      Heart sounds: Normal heart sounds.   Pulmonary:      Effort: " Pulmonary effort is normal.      Breath sounds: Normal breath sounds.   Abdominal:      General: Bowel sounds are normal.      Palpations: Abdomen is soft.      Tenderness: There is no abdominal tenderness.   Musculoskeletal: Normal range of motion.   Skin:     General: Skin is warm.      Findings: No rash.   Neurological:      General: No focal deficit present.      Mental Status: She is alert.   Psychiatric:         Speech: Speech normal.         Behavior: Behavior normal. Behavior is cooperative.                 Assessment/Plan     Karlene was seen today for hypertension.    Diagnoses and all orders for this visit:    Wellness examination    Breast cancer screening by mammogram  -     Mammo Digital Screening Bilateral With CAD; Future    Hyperlipidemia, unspecified hyperlipidemia type  -     URINALYSIS; Future  -     Lipid Panel; Future    Leg cramps  -     Magnesium; Future  -     CBC auto differential; Future  -     CK; Future    Essential hypertension  -     Comprehensive metabolic panel; Future    Insomnia, unspecified type  -    Enroll in controlled substance program again. TOXICOLOGY SCREEN, URINE, RANDOM (COMPLIANCE); Future  -     zolpidem (AMBIEN) 10 mg Tab; Take 1 tablet (10 mg total) by mouth nightly as needed.    Follow up in about 6 months (around 3/4/2021) for check up.    Future Appointments   Date Time Provider Department Center   9/18/2020  3:45 PM [facility] [place]   3/5/2021  8:40 AM MD ADDY Michael [facility] [place]       Jess Lr MD  [facility]

## 2020-09-18 ENCOUNTER — HOSPITAL ENCOUNTER (OUTPATIENT)
Dept: RADIOLOGY | Facility: CLINIC | Age: 55
Discharge: HOME | End: 2020-09-18
Attending: FAMILY MEDICINE
Payer: COMMERCIAL

## 2020-09-18 VITALS — HEIGHT: 61 IN | BODY MASS INDEX: 29.55 KG/M2 | WEIGHT: 156.5 LBS

## 2020-09-18 DIAGNOSIS — Z12.31 BREAST CANCER SCREENING BY MAMMOGRAM: ICD-10-CM

## 2020-09-18 PROCEDURE — 77063 BREAST TOMOSYNTHESIS BI: CPT | Mod: 26,,, | Performed by: RADIOLOGY

## 2020-09-18 PROCEDURE — 77067 SCR MAMMO BI INCL CAD: CPT | Mod: TC,PO

## 2020-09-18 PROCEDURE — 77063 MAMMO DIGITAL SCREENING BILAT WITH TOMO: ICD-10-PCS | Mod: 26,,, | Performed by: RADIOLOGY

## 2020-09-18 PROCEDURE — 77067 SCR MAMMO BI INCL CAD: CPT | Mod: 26,,, | Performed by: RADIOLOGY

## 2020-09-18 PROCEDURE — 77067 MAMMO DIGITAL SCREENING BILAT WITH TOMO: ICD-10-PCS | Mod: 26,,, | Performed by: RADIOLOGY

## 2021-02-24 ENCOUNTER — TELEPHONE (OUTPATIENT)
Dept: FAMILY MEDICINE | Facility: CLINIC | Age: 56
End: 2021-02-24

## 2021-03-19 ENCOUNTER — LAB VISIT (OUTPATIENT)
Dept: LAB | Facility: HOSPITAL | Age: 56
End: 2021-03-19
Attending: FAMILY MEDICINE
Payer: COMMERCIAL

## 2021-03-19 ENCOUNTER — OFFICE VISIT (OUTPATIENT)
Dept: FAMILY MEDICINE | Facility: CLINIC | Age: 56
End: 2021-03-19
Payer: COMMERCIAL

## 2021-03-19 VITALS
SYSTOLIC BLOOD PRESSURE: 118 MMHG | HEART RATE: 100 BPM | WEIGHT: 160.69 LBS | HEIGHT: 61 IN | OXYGEN SATURATION: 98 % | DIASTOLIC BLOOD PRESSURE: 74 MMHG | BODY MASS INDEX: 30.34 KG/M2 | TEMPERATURE: 98 F

## 2021-03-19 DIAGNOSIS — I10 ESSENTIAL HYPERTENSION: ICD-10-CM

## 2021-03-19 DIAGNOSIS — I10 ESSENTIAL HYPERTENSION: Primary | ICD-10-CM

## 2021-03-19 DIAGNOSIS — G47.00 INSOMNIA, UNSPECIFIED TYPE: ICD-10-CM

## 2021-03-19 LAB
ALBUMIN SERPL BCP-MCNC: 3.9 G/DL (ref 3.5–5.2)
ALP SERPL-CCNC: 81 U/L (ref 55–135)
ALT SERPL W/O P-5'-P-CCNC: 28 U/L (ref 10–44)
ANION GAP SERPL CALC-SCNC: 10 MMOL/L (ref 8–16)
AST SERPL-CCNC: 23 U/L (ref 10–40)
BILIRUB SERPL-MCNC: 0.3 MG/DL (ref 0.1–1)
BUN SERPL-MCNC: 15 MG/DL (ref 6–20)
CALCIUM SERPL-MCNC: 9 MG/DL (ref 8.7–10.5)
CHLORIDE SERPL-SCNC: 106 MMOL/L (ref 95–110)
CO2 SERPL-SCNC: 24 MMOL/L (ref 23–29)
CREAT SERPL-MCNC: 0.8 MG/DL (ref 0.5–1.4)
EST. GFR  (AFRICAN AMERICAN): >60 ML/MIN/1.73 M^2
EST. GFR  (NON AFRICAN AMERICAN): >60 ML/MIN/1.73 M^2
GLUCOSE SERPL-MCNC: 81 MG/DL (ref 70–110)
POTASSIUM SERPL-SCNC: 3.5 MMOL/L (ref 3.5–5.1)
PROT SERPL-MCNC: 7.1 G/DL (ref 6–8.4)
SODIUM SERPL-SCNC: 140 MMOL/L (ref 136–145)

## 2021-03-19 PROCEDURE — 99999 PR PBB SHADOW E&M-EST. PATIENT-LVL III: ICD-10-PCS | Mod: PBBFAC,,, | Performed by: FAMILY MEDICINE

## 2021-03-19 PROCEDURE — 3008F BODY MASS INDEX DOCD: CPT | Mod: CPTII,S$GLB,, | Performed by: FAMILY MEDICINE

## 2021-03-19 PROCEDURE — 3078F PR MOST RECENT DIASTOLIC BLOOD PRESSURE < 80 MM HG: ICD-10-PCS | Mod: CPTII,S$GLB,, | Performed by: FAMILY MEDICINE

## 2021-03-19 PROCEDURE — 3078F DIAST BP <80 MM HG: CPT | Mod: CPTII,S$GLB,, | Performed by: FAMILY MEDICINE

## 2021-03-19 PROCEDURE — 36415 COLL VENOUS BLD VENIPUNCTURE: CPT | Mod: PO | Performed by: FAMILY MEDICINE

## 2021-03-19 PROCEDURE — 3008F PR BODY MASS INDEX (BMI) DOCUMENTED: ICD-10-PCS | Mod: CPTII,S$GLB,, | Performed by: FAMILY MEDICINE

## 2021-03-19 PROCEDURE — 99214 PR OFFICE/OUTPT VISIT, EST, LEVL IV, 30-39 MIN: ICD-10-PCS | Mod: S$GLB,,, | Performed by: FAMILY MEDICINE

## 2021-03-19 PROCEDURE — 99214 OFFICE O/P EST MOD 30 MIN: CPT | Mod: S$GLB,,, | Performed by: FAMILY MEDICINE

## 2021-03-19 PROCEDURE — 99999 PR PBB SHADOW E&M-EST. PATIENT-LVL III: CPT | Mod: PBBFAC,,, | Performed by: FAMILY MEDICINE

## 2021-03-19 PROCEDURE — 80053 COMPREHEN METABOLIC PANEL: CPT | Performed by: FAMILY MEDICINE

## 2021-03-19 PROCEDURE — 3074F PR MOST RECENT SYSTOLIC BLOOD PRESSURE < 130 MM HG: ICD-10-PCS | Mod: CPTII,S$GLB,, | Performed by: FAMILY MEDICINE

## 2021-03-19 PROCEDURE — 1126F PR PAIN SEVERITY QUANTIFIED, NO PAIN PRESENT: ICD-10-PCS | Mod: S$GLB,,, | Performed by: FAMILY MEDICINE

## 2021-03-19 PROCEDURE — 3074F SYST BP LT 130 MM HG: CPT | Mod: CPTII,S$GLB,, | Performed by: FAMILY MEDICINE

## 2021-03-19 PROCEDURE — 1126F AMNT PAIN NOTED NONE PRSNT: CPT | Mod: S$GLB,,, | Performed by: FAMILY MEDICINE

## 2021-03-19 RX ORDER — ZOLPIDEM TARTRATE 10 MG/1
10 TABLET ORAL NIGHTLY PRN
Qty: 90 TABLET | Refills: 1 | Status: SHIPPED | OUTPATIENT
Start: 2021-03-19 | End: 2021-09-19

## 2021-05-16 DIAGNOSIS — E78.5 HYPERLIPIDEMIA, UNSPECIFIED HYPERLIPIDEMIA TYPE: ICD-10-CM

## 2021-05-17 RX ORDER — SIMVASTATIN 10 MG/1
TABLET, FILM COATED ORAL
Qty: 90 TABLET | Refills: 1 | Status: SHIPPED | OUTPATIENT
Start: 2021-05-17 | End: 2021-12-08

## 2021-07-20 ENCOUNTER — PATIENT MESSAGE (OUTPATIENT)
Dept: FAMILY MEDICINE | Facility: CLINIC | Age: 56
End: 2021-07-20

## 2021-07-21 ENCOUNTER — PATIENT MESSAGE (OUTPATIENT)
Dept: FAMILY MEDICINE | Facility: CLINIC | Age: 56
End: 2021-07-21

## 2021-09-08 ENCOUNTER — TELEPHONE (OUTPATIENT)
Dept: FAMILY MEDICINE | Facility: CLINIC | Age: 56
End: 2021-09-08

## 2021-09-10 ENCOUNTER — OFFICE VISIT (OUTPATIENT)
Dept: FAMILY MEDICINE | Facility: CLINIC | Age: 56
End: 2021-09-10
Payer: COMMERCIAL

## 2021-09-10 DIAGNOSIS — I10 ESSENTIAL HYPERTENSION: ICD-10-CM

## 2021-09-10 DIAGNOSIS — Z79.899 CONTROLLED SUBSTANCE AGREEMENT SIGNED: ICD-10-CM

## 2021-09-10 DIAGNOSIS — Z00.00 WELLNESS EXAMINATION: Primary | ICD-10-CM

## 2021-09-10 DIAGNOSIS — E78.2 HYPERLIPIDEMIA, MIXED: ICD-10-CM

## 2021-09-10 DIAGNOSIS — G47.00 INSOMNIA, UNSPECIFIED TYPE: ICD-10-CM

## 2021-09-10 DIAGNOSIS — Z12.31 BREAST CANCER SCREENING BY MAMMOGRAM: ICD-10-CM

## 2021-09-10 PROCEDURE — 3066F PR DOCUMENTATION OF TREATMENT FOR NEPHROPATHY: ICD-10-PCS | Mod: CPTII,,, | Performed by: FAMILY MEDICINE

## 2021-09-10 PROCEDURE — 4010F PR ACE/ARB THEARPY RXD/TAKEN: ICD-10-PCS | Mod: CPTII,,, | Performed by: FAMILY MEDICINE

## 2021-09-10 PROCEDURE — 3061F PR NEG MICROALBUMINURIA RESULT DOCUMENTED/REVIEW: ICD-10-PCS | Mod: CPTII,,, | Performed by: FAMILY MEDICINE

## 2021-09-10 PROCEDURE — 3066F NEPHROPATHY DOC TX: CPT | Mod: CPTII,,, | Performed by: FAMILY MEDICINE

## 2021-09-10 PROCEDURE — 99396 PREV VISIT EST AGE 40-64: CPT | Mod: 95,,, | Performed by: FAMILY MEDICINE

## 2021-09-10 PROCEDURE — 4010F ACE/ARB THERAPY RXD/TAKEN: CPT | Mod: CPTII,,, | Performed by: FAMILY MEDICINE

## 2021-09-10 PROCEDURE — 3061F NEG MICROALBUMINURIA REV: CPT | Mod: CPTII,,, | Performed by: FAMILY MEDICINE

## 2021-09-10 PROCEDURE — 99396 PR PREVENTIVE VISIT,EST,40-64: ICD-10-PCS | Mod: 95,,, | Performed by: FAMILY MEDICINE

## 2021-09-11 ENCOUNTER — PATIENT MESSAGE (OUTPATIENT)
Dept: FAMILY MEDICINE | Facility: CLINIC | Age: 56
End: 2021-09-11

## 2021-09-15 ENCOUNTER — PATIENT MESSAGE (OUTPATIENT)
Dept: FAMILY MEDICINE | Facility: CLINIC | Age: 56
End: 2021-09-15

## 2021-09-17 DIAGNOSIS — G47.00 INSOMNIA, UNSPECIFIED TYPE: ICD-10-CM

## 2021-09-19 RX ORDER — ZOLPIDEM TARTRATE 10 MG/1
10 TABLET ORAL NIGHTLY PRN
Qty: 30 TABLET | Refills: 0 | Status: SHIPPED | OUTPATIENT
Start: 2021-09-19 | End: 2021-10-20

## 2021-09-21 ENCOUNTER — PATIENT MESSAGE (OUTPATIENT)
Dept: FAMILY MEDICINE | Facility: CLINIC | Age: 56
End: 2021-09-21

## 2021-09-28 ENCOUNTER — HOSPITAL ENCOUNTER (OUTPATIENT)
Dept: RADIOLOGY | Facility: CLINIC | Age: 56
Discharge: HOME | End: 2021-09-28
Attending: FAMILY MEDICINE
Payer: COMMERCIAL

## 2021-09-28 ENCOUNTER — LAB VISIT (OUTPATIENT)
Dept: LAB | Facility: HOSPITAL | Age: 56
End: 2021-09-28
Attending: FAMILY MEDICINE
Payer: COMMERCIAL

## 2021-09-28 DIAGNOSIS — I10 ESSENTIAL HYPERTENSION: ICD-10-CM

## 2021-09-28 DIAGNOSIS — E78.2 HYPERLIPIDEMIA, MIXED: ICD-10-CM

## 2021-09-28 DIAGNOSIS — Z12.31 BREAST CANCER SCREENING BY MAMMOGRAM: ICD-10-CM

## 2021-09-28 DIAGNOSIS — D64.9 ANEMIA, UNSPECIFIED TYPE: Primary | ICD-10-CM

## 2021-09-28 LAB
ALBUMIN SERPL BCP-MCNC: 3.9 G/DL (ref 3.5–5.2)
ALP SERPL-CCNC: 76 U/L (ref 55–135)
ALT SERPL W/O P-5'-P-CCNC: 23 U/L (ref 10–44)
ANION GAP SERPL CALC-SCNC: 11 MMOL/L (ref 8–16)
AST SERPL-CCNC: 17 U/L (ref 10–40)
BASOPHILS # BLD AUTO: 0.05 K/UL (ref 0–0.2)
BASOPHILS NFR BLD: 0.7 % (ref 0–1.9)
BILIRUB SERPL-MCNC: 0.2 MG/DL (ref 0.1–1)
BUN SERPL-MCNC: 18 MG/DL (ref 6–20)
CALCIUM SERPL-MCNC: 9.2 MG/DL (ref 8.7–10.5)
CHLORIDE SERPL-SCNC: 104 MMOL/L (ref 95–110)
CHOLEST SERPL-MCNC: 150 MG/DL (ref 120–199)
CHOLEST/HDLC SERPL: 3.6 {RATIO} (ref 2–5)
CO2 SERPL-SCNC: 22 MMOL/L (ref 23–29)
CREAT SERPL-MCNC: 0.8 MG/DL (ref 0.5–1.4)
DIFFERENTIAL METHOD: ABNORMAL
EOSINOPHIL # BLD AUTO: 0.2 K/UL (ref 0–0.5)
EOSINOPHIL NFR BLD: 3.2 % (ref 0–8)
ERYTHROCYTE [DISTWIDTH] IN BLOOD BY AUTOMATED COUNT: 12.6 % (ref 11.5–14.5)
EST. GFR  (AFRICAN AMERICAN): >60 ML/MIN/1.73 M^2
EST. GFR  (NON AFRICAN AMERICAN): >60 ML/MIN/1.73 M^2
GLUCOSE SERPL-MCNC: 86 MG/DL (ref 70–110)
HCT VFR BLD AUTO: 36.2 % (ref 37–48.5)
HDLC SERPL-MCNC: 42 MG/DL (ref 40–75)
HDLC SERPL: 28 % (ref 20–50)
HGB BLD-MCNC: 11.9 G/DL (ref 12–16)
IMM GRANULOCYTES # BLD AUTO: 0.02 K/UL (ref 0–0.04)
IMM GRANULOCYTES NFR BLD AUTO: 0.3 % (ref 0–0.5)
LDLC SERPL CALC-MCNC: 76.8 MG/DL (ref 63–159)
LYMPHOCYTES # BLD AUTO: 2.1 K/UL (ref 1–4.8)
LYMPHOCYTES NFR BLD: 30.1 % (ref 18–48)
MCH RBC QN AUTO: 29.5 PG (ref 27–31)
MCHC RBC AUTO-ENTMCNC: 32.9 G/DL (ref 32–36)
MCV RBC AUTO: 90 FL (ref 82–98)
MONOCYTES # BLD AUTO: 0.6 K/UL (ref 0.3–1)
MONOCYTES NFR BLD: 9.3 % (ref 4–15)
NEUTROPHILS # BLD AUTO: 3.9 K/UL (ref 1.8–7.7)
NEUTROPHILS NFR BLD: 56.4 % (ref 38–73)
NONHDLC SERPL-MCNC: 108 MG/DL
NRBC BLD-RTO: 0 /100 WBC
PLATELET # BLD AUTO: 361 K/UL (ref 150–450)
PMV BLD AUTO: 10.1 FL (ref 9.2–12.9)
POTASSIUM SERPL-SCNC: 4 MMOL/L (ref 3.5–5.1)
PROT SERPL-MCNC: 7 G/DL (ref 6–8.4)
RBC # BLD AUTO: 4.03 M/UL (ref 4–5.4)
SODIUM SERPL-SCNC: 137 MMOL/L (ref 136–145)
TRIGL SERPL-MCNC: 156 MG/DL (ref 30–150)
WBC # BLD AUTO: 6.88 K/UL (ref 3.9–12.7)

## 2021-09-28 PROCEDURE — 77063 BREAST TOMOSYNTHESIS BI: CPT | Mod: 26,,, | Performed by: RADIOLOGY

## 2021-09-28 PROCEDURE — 80061 LIPID PANEL: CPT | Performed by: FAMILY MEDICINE

## 2021-09-28 PROCEDURE — 77067 MAMMO DIGITAL SCREENING BILAT WITH TOMO: ICD-10-PCS | Mod: 26,,, | Performed by: RADIOLOGY

## 2021-09-28 PROCEDURE — 77067 SCR MAMMO BI INCL CAD: CPT | Mod: TC,PO

## 2021-09-28 PROCEDURE — 85025 COMPLETE CBC W/AUTO DIFF WBC: CPT | Performed by: FAMILY MEDICINE

## 2021-09-28 PROCEDURE — 77067 SCR MAMMO BI INCL CAD: CPT | Mod: 26,,, | Performed by: RADIOLOGY

## 2021-09-28 PROCEDURE — 77063 MAMMO DIGITAL SCREENING BILAT WITH TOMO: ICD-10-PCS | Mod: 26,,, | Performed by: RADIOLOGY

## 2021-09-28 PROCEDURE — 36415 COLL VENOUS BLD VENIPUNCTURE: CPT | Mod: PO | Performed by: FAMILY MEDICINE

## 2021-09-28 PROCEDURE — 80053 COMPREHEN METABOLIC PANEL: CPT | Performed by: FAMILY MEDICINE

## 2021-09-29 ENCOUNTER — TELEPHONE (OUTPATIENT)
Dept: FAMILY MEDICINE | Facility: CLINIC | Age: 56
End: 2021-09-29

## 2021-10-01 ENCOUNTER — LAB VISIT (OUTPATIENT)
Dept: LAB | Facility: HOSPITAL | Age: 56
End: 2021-10-01
Attending: FAMILY MEDICINE
Payer: COMMERCIAL

## 2021-10-01 DIAGNOSIS — R82.90 ABNORMAL URINALYSIS: ICD-10-CM

## 2021-10-01 PROCEDURE — 87086 URINE CULTURE/COLONY COUNT: CPT | Performed by: FAMILY MEDICINE

## 2021-10-03 LAB — BACTERIA UR CULT: NORMAL

## 2022-02-24 RX ORDER — CYCLOBENZAPRINE HCL 10 MG
10 TABLET ORAL NIGHTLY PRN
Qty: 90 TABLET | Refills: 0 | Status: ON HOLD | OUTPATIENT
Start: 2022-02-24 | End: 2022-05-09

## 2022-03-10 ENCOUNTER — LAB VISIT (OUTPATIENT)
Dept: LAB | Facility: HOSPITAL | Age: 57
End: 2022-03-10
Attending: STUDENT IN AN ORGANIZED HEALTH CARE EDUCATION/TRAINING PROGRAM
Payer: COMMERCIAL

## 2022-03-10 ENCOUNTER — OFFICE VISIT (OUTPATIENT)
Dept: FAMILY MEDICINE | Facility: CLINIC | Age: 57
End: 2022-03-10
Payer: COMMERCIAL

## 2022-03-10 VITALS
TEMPERATURE: 98 F | DIASTOLIC BLOOD PRESSURE: 68 MMHG | OXYGEN SATURATION: 97 % | SYSTOLIC BLOOD PRESSURE: 110 MMHG | HEIGHT: 61 IN | RESPIRATION RATE: 16 BRPM | WEIGHT: 160.94 LBS | BODY MASS INDEX: 30.39 KG/M2 | HEART RATE: 106 BPM

## 2022-03-10 DIAGNOSIS — R79.9 ABNORMAL FINDING OF BLOOD CHEMISTRY, UNSPECIFIED: ICD-10-CM

## 2022-03-10 DIAGNOSIS — D64.9 ANEMIA, UNSPECIFIED TYPE: ICD-10-CM

## 2022-03-10 DIAGNOSIS — Z12.11 COLON CANCER SCREENING: ICD-10-CM

## 2022-03-10 DIAGNOSIS — D64.9 NORMOCYTIC ANEMIA: ICD-10-CM

## 2022-03-10 DIAGNOSIS — R25.2 LEG CRAMPS: ICD-10-CM

## 2022-03-10 DIAGNOSIS — L71.9 ROSACEA: ICD-10-CM

## 2022-03-10 DIAGNOSIS — I10 ESSENTIAL HYPERTENSION: ICD-10-CM

## 2022-03-10 DIAGNOSIS — E78.2 HYPERLIPIDEMIA, MIXED: ICD-10-CM

## 2022-03-10 DIAGNOSIS — G47.00 INSOMNIA, UNSPECIFIED TYPE: ICD-10-CM

## 2022-03-10 DIAGNOSIS — Z00.00 ENCOUNTER FOR PREVENTIVE HEALTH EXAMINATION: Primary | ICD-10-CM

## 2022-03-10 LAB
ALBUMIN SERPL BCP-MCNC: 4.1 G/DL (ref 3.5–5.2)
ALP SERPL-CCNC: 80 U/L (ref 55–135)
ALT SERPL W/O P-5'-P-CCNC: 24 U/L (ref 10–44)
ANION GAP SERPL CALC-SCNC: 11 MMOL/L (ref 8–16)
AST SERPL-CCNC: 20 U/L (ref 10–40)
BASOPHILS # BLD AUTO: 0.08 K/UL (ref 0–0.2)
BASOPHILS NFR BLD: 1 % (ref 0–1.9)
BILIRUB SERPL-MCNC: 0.2 MG/DL (ref 0.1–1)
BUN SERPL-MCNC: 15 MG/DL (ref 6–20)
CALCIUM SERPL-MCNC: 9.6 MG/DL (ref 8.7–10.5)
CHLORIDE SERPL-SCNC: 106 MMOL/L (ref 95–110)
CO2 SERPL-SCNC: 25 MMOL/L (ref 23–29)
CREAT SERPL-MCNC: 0.8 MG/DL (ref 0.5–1.4)
DIFFERENTIAL METHOD: ABNORMAL
EOSINOPHIL # BLD AUTO: 0.3 K/UL (ref 0–0.5)
EOSINOPHIL NFR BLD: 3.7 % (ref 0–8)
ERYTHROCYTE [DISTWIDTH] IN BLOOD BY AUTOMATED COUNT: 12.7 % (ref 11.5–14.5)
EST. GFR  (AFRICAN AMERICAN): >60 ML/MIN/1.73 M^2
EST. GFR  (NON AFRICAN AMERICAN): >60 ML/MIN/1.73 M^2
FERRITIN SERPL-MCNC: 103 NG/ML (ref 20–300)
FERRITIN SERPL-MCNC: 103 NG/ML (ref 20–300)
FOLATE SERPL-MCNC: 5.6 NG/ML (ref 4–24)
GLUCOSE SERPL-MCNC: 86 MG/DL (ref 70–110)
HCT VFR BLD AUTO: 39 % (ref 37–48.5)
HGB BLD-MCNC: 12.4 G/DL (ref 12–16)
IMM GRANULOCYTES # BLD AUTO: 0.03 K/UL (ref 0–0.04)
IMM GRANULOCYTES NFR BLD AUTO: 0.4 % (ref 0–0.5)
IRON SERPL-MCNC: 73 UG/DL (ref 30–160)
IRON SERPL-MCNC: 73 UG/DL (ref 30–160)
LYMPHOCYTES # BLD AUTO: 2.1 K/UL (ref 1–4.8)
LYMPHOCYTES NFR BLD: 26.3 % (ref 18–48)
MAGNESIUM SERPL-MCNC: 2.1 MG/DL (ref 1.6–2.6)
MCH RBC QN AUTO: 29.2 PG (ref 27–31)
MCHC RBC AUTO-ENTMCNC: 31.8 G/DL (ref 32–36)
MCV RBC AUTO: 92 FL (ref 82–98)
MONOCYTES # BLD AUTO: 0.8 K/UL (ref 0.3–1)
MONOCYTES NFR BLD: 10.5 % (ref 4–15)
NEUTROPHILS # BLD AUTO: 4.6 K/UL (ref 1.8–7.7)
NEUTROPHILS NFR BLD: 58.1 % (ref 38–73)
NRBC BLD-RTO: 0 /100 WBC
PLATELET # BLD AUTO: 372 K/UL (ref 150–450)
PMV BLD AUTO: 10.3 FL (ref 9.2–12.9)
POTASSIUM SERPL-SCNC: 4.4 MMOL/L (ref 3.5–5.1)
PROT SERPL-MCNC: 7.4 G/DL (ref 6–8.4)
RBC # BLD AUTO: 4.25 M/UL (ref 4–5.4)
SATURATED IRON: 19 % (ref 20–50)
SATURATED IRON: 19 % (ref 20–50)
SODIUM SERPL-SCNC: 142 MMOL/L (ref 136–145)
TOTAL IRON BINDING CAPACITY: 385 UG/DL (ref 250–450)
TOTAL IRON BINDING CAPACITY: 385 UG/DL (ref 250–450)
TRANSFERRIN SERPL-MCNC: 260 MG/DL (ref 200–375)
TRANSFERRIN SERPL-MCNC: 260 MG/DL (ref 200–375)
WBC # BLD AUTO: 7.9 K/UL (ref 3.9–12.7)

## 2022-03-10 PROCEDURE — 1159F PR MEDICATION LIST DOCUMENTED IN MEDICAL RECORD: ICD-10-PCS | Mod: CPTII,S$GLB,, | Performed by: STUDENT IN AN ORGANIZED HEALTH CARE EDUCATION/TRAINING PROGRAM

## 2022-03-10 PROCEDURE — 84466 ASSAY OF TRANSFERRIN: CPT | Performed by: FAMILY MEDICINE

## 2022-03-10 PROCEDURE — 82607 VITAMIN B-12: CPT | Performed by: STUDENT IN AN ORGANIZED HEALTH CARE EDUCATION/TRAINING PROGRAM

## 2022-03-10 PROCEDURE — 3074F PR MOST RECENT SYSTOLIC BLOOD PRESSURE < 130 MM HG: ICD-10-PCS | Mod: CPTII,S$GLB,, | Performed by: STUDENT IN AN ORGANIZED HEALTH CARE EDUCATION/TRAINING PROGRAM

## 2022-03-10 PROCEDURE — 1159F MED LIST DOCD IN RCRD: CPT | Mod: CPTII,S$GLB,, | Performed by: STUDENT IN AN ORGANIZED HEALTH CARE EDUCATION/TRAINING PROGRAM

## 2022-03-10 PROCEDURE — 83735 ASSAY OF MAGNESIUM: CPT | Performed by: STUDENT IN AN ORGANIZED HEALTH CARE EDUCATION/TRAINING PROGRAM

## 2022-03-10 PROCEDURE — 82728 ASSAY OF FERRITIN: CPT | Performed by: FAMILY MEDICINE

## 2022-03-10 PROCEDURE — 83921 ORGANIC ACID SINGLE QUANT: CPT | Performed by: STUDENT IN AN ORGANIZED HEALTH CARE EDUCATION/TRAINING PROGRAM

## 2022-03-10 PROCEDURE — 3008F BODY MASS INDEX DOCD: CPT | Mod: CPTII,S$GLB,, | Performed by: STUDENT IN AN ORGANIZED HEALTH CARE EDUCATION/TRAINING PROGRAM

## 2022-03-10 PROCEDURE — 99396 PR PREVENTIVE VISIT,EST,40-64: ICD-10-PCS | Mod: S$GLB,,, | Performed by: STUDENT IN AN ORGANIZED HEALTH CARE EDUCATION/TRAINING PROGRAM

## 2022-03-10 PROCEDURE — 3078F PR MOST RECENT DIASTOLIC BLOOD PRESSURE < 80 MM HG: ICD-10-PCS | Mod: CPTII,S$GLB,, | Performed by: STUDENT IN AN ORGANIZED HEALTH CARE EDUCATION/TRAINING PROGRAM

## 2022-03-10 PROCEDURE — 4010F PR ACE/ARB THEARPY RXD/TAKEN: ICD-10-PCS | Mod: CPTII,S$GLB,, | Performed by: STUDENT IN AN ORGANIZED HEALTH CARE EDUCATION/TRAINING PROGRAM

## 2022-03-10 PROCEDURE — 82746 ASSAY OF FOLIC ACID SERUM: CPT | Performed by: STUDENT IN AN ORGANIZED HEALTH CARE EDUCATION/TRAINING PROGRAM

## 2022-03-10 PROCEDURE — 85025 COMPLETE CBC W/AUTO DIFF WBC: CPT | Performed by: STUDENT IN AN ORGANIZED HEALTH CARE EDUCATION/TRAINING PROGRAM

## 2022-03-10 PROCEDURE — 4010F ACE/ARB THERAPY RXD/TAKEN: CPT | Mod: CPTII,S$GLB,, | Performed by: STUDENT IN AN ORGANIZED HEALTH CARE EDUCATION/TRAINING PROGRAM

## 2022-03-10 PROCEDURE — 3078F DIAST BP <80 MM HG: CPT | Mod: CPTII,S$GLB,, | Performed by: STUDENT IN AN ORGANIZED HEALTH CARE EDUCATION/TRAINING PROGRAM

## 2022-03-10 PROCEDURE — 3008F PR BODY MASS INDEX (BMI) DOCUMENTED: ICD-10-PCS | Mod: CPTII,S$GLB,, | Performed by: STUDENT IN AN ORGANIZED HEALTH CARE EDUCATION/TRAINING PROGRAM

## 2022-03-10 PROCEDURE — 3074F SYST BP LT 130 MM HG: CPT | Mod: CPTII,S$GLB,, | Performed by: STUDENT IN AN ORGANIZED HEALTH CARE EDUCATION/TRAINING PROGRAM

## 2022-03-10 PROCEDURE — 99999 PR PBB SHADOW E&M-EST. PATIENT-LVL V: CPT | Mod: PBBFAC,,, | Performed by: STUDENT IN AN ORGANIZED HEALTH CARE EDUCATION/TRAINING PROGRAM

## 2022-03-10 PROCEDURE — 80053 COMPREHEN METABOLIC PANEL: CPT | Performed by: STUDENT IN AN ORGANIZED HEALTH CARE EDUCATION/TRAINING PROGRAM

## 2022-03-10 PROCEDURE — 99999 PR PBB SHADOW E&M-EST. PATIENT-LVL V: ICD-10-PCS | Mod: PBBFAC,,, | Performed by: STUDENT IN AN ORGANIZED HEALTH CARE EDUCATION/TRAINING PROGRAM

## 2022-03-10 PROCEDURE — 99396 PREV VISIT EST AGE 40-64: CPT | Mod: S$GLB,,, | Performed by: STUDENT IN AN ORGANIZED HEALTH CARE EDUCATION/TRAINING PROGRAM

## 2022-03-10 RX ORDER — HYDROXYZINE HYDROCHLORIDE 25 MG/1
TABLET, FILM COATED ORAL
COMMUNITY
Start: 2021-10-01 | End: 2022-03-10

## 2022-03-10 RX ORDER — METRONIDAZOLE 10 MG/G
GEL TOPICAL DAILY
Qty: 60 G | Refills: 11 | Status: SHIPPED | OUTPATIENT
Start: 2022-03-10

## 2022-03-10 RX ORDER — ZOLPIDEM TARTRATE 10 MG/1
10 TABLET ORAL NIGHTLY PRN
Qty: 30 TABLET | Refills: 5 | Status: SHIPPED | OUTPATIENT
Start: 2022-03-10 | End: 2022-11-07

## 2022-03-10 NOTE — PATIENT INSTRUCTIONS
"Please read below to learn more on healthy choices, screenings, and useful information related to your health.      "Learning is the only thing the mind never exhausts, never fears, and never regrets." Jayson Hines    Table of Contents:     Overdue health maintenance   Cancer prevention  Explanation on the different components of your blood work and interpretation  Frequently asked questions         Toby Soler, if you are due for any health screening(s) below please notify me so we can arrange them to be ordered and scheduled to maintain your health.   Health Maintenance Due   Topic    Colorectal Cancer Screening                                 Cancer Prevention    Why should you choose to get screened for cancer? One simple reason is because you are important. You matter and deserve to have the best health so you can fulfill your great potential.       Colon Cancer screening - Most colon cancers can be prevented by screening. In most cases a polyp in the colon can grow and is not cancerous at first, but it can become cancerous years later. A polyp is like a seed that can grow into a weed if it is left in the soil. If the seed is detected and removed, no weed sprouts. A FIT test/Cologuard test and colonoscopy can detect precancerous polyps and lead to the prevention of cancer. A polyp is just like a seed that can be removed before the roots take hold. A colonoscopy can remove these polyps and eliminate the chance that these polyps turn into cancer.     Cervical Cancer screening- A PAP smear can detect precancerous cells that can become cervical cancer and can lead to procedures to remove them. It is very important to get a PAP smear as investing these few minutes can help prevent a lot of trouble down the road. If you want to do the most you can do to spend more time with your family and friends', cancer screenings can help with that goal.     Breast Cancer screening- Mammograms can detect breast cancer before " "it has spread and early detection allows the ability to remove the lesion prior to any spread. It is similar to a small rotten spot on fruit. If the spoiled part is removed quickly it can preserve the rest of the fruit, but if it is left alone it will corrupt the whole peach.     Smoking Cessation- "Smoking is like a chimney. The tar builds up and causes a back draft which leads to a cough. Smoking is like sitting in a car with a blocked exhaust system." Smoking can increase your risk for lung, mouth, throat, nose, esophagus, bladder, kidney, ureter, pancreas, stomach, liver, cervix, ovary, bowel, and leukemia [2]. If you have smoked in the past, you might meet the criteria for a CT lung cancer screening.     Lung Cancer Screening - "The U.S. Preventive Services Task Force (USPSTF) recommendsexternal icon yearly lung cancer screening with LDCT for people who--have a 20 pack-year or more smoking history, and smoke now or have quit within the past 15 years, and are between 50 and 80 years old. A pack-year is smoking an average of one pack of cigarettes per day for one year. For example, a person could have a 20 pack-year history by smoking one pack a day for 20 years or two packs a day for 10 years." [3]    Hypertension - Common high blood pressure meds may lower colorectal cancer risk-KIKO, July 6, 2020 - Hypertension Journal Report Medications commonly prescribed to treat high blood pressure may also reduce patients' colorectal cancer risk, according to new research published today in Hypertension, an American Heart Association journal." [4].     Low HbA1c - "Higher HbA1c levels (within both the non?diabetic and diabetic ranges) were associated with the risk of colorectal cancer (Model 2; P linear?=?0.009), especially for colon cancer." [5].    A healthy diet, exercise, limitation of alcohol use, certain infections, avoidance of radiation/environmental toxins, and staying lean lowers your cancer risk [6].     I " want to empower you to make informed choices for your health. I have a listed below the most common blood components that we check for when you get blood work. I discuss what each component measures along with common reasons for why they can be abnormal. If you are interested in understanding your blood work better, please read the lab explanation attached below.     Explanation of Lab Results    Please note: This information is included as a reference to help you better understand your lab results and is not be used for diagnosis.     Lipid Panel:  Cholesterol is a measure of cardiac risk and stroke risk. A lipid panel measures total cholesterol and provides readings which are broken down into 3 subsections: Triglycerides, HDL and LDL.    Total Cholesterol: Your total blood cholesterol is a measure of LDL cholesterol, HDL cholesterol, and other lipid components.  If your individual lipid level components are in the normal range and you have an elevated total cholesterol level we are generally not to concerned since we primarily look at the LDL cholesterol which is considered the bad cholesterol which can lead to heart attacks and strokes.  Your calculated cardiac risk is the most important factor in deciding if you would benefit from a cholesterol-lowering medication that the total cholesterol level.    Triglycerides are most diet and weight sensitive. They are affected easily by lifestyle changes. Ideally, this reading should be less than 150, although if the remainder of the cholesterol panel is within normal limits, we will tolerate upwards of 250-300. For the most part, if triglycerides are the only part of your cholesterol panel reading as 'abnormal', it is best to lose weight and modify your diet, including less saturated fat and less simple sugars. We only start medication to lower this is the level is greater than 500 since this can increase ones risk for pancreatitis. This is not the cholesterol type that  leads to heart attacks.     HDL is your 'good cholesterol.' Ideally, the reading should be over 40 and is particularly helpful when it is greater than 60. Research indicates that high HDL is extremely protective; and if your HDL level is greater than 60, we tolerate far worse LDL or triglyceride levels. For the most part, HDL is responsive to aerobic activity and ideal weight. We do not have medicines that increase the HDL reading very easily.    LDL is your 'bad cholesterol.' Our goals depend on how many cardiac risk factors you have.     High LDL: If you are diabetic or have had a heart attack, we like the LDL level to be less than 100. If you have 2 cardiac risk factors (such as diabetes, hypertension, family history, a low HDL and smoking), we like your LDL to be less than 130. If you have 0-2 cardiac risk factors, we like your LDL level to be less than 160, but we may not necessarily initiate medications to 190 unless you cannot lower it. The latest guidelines recommend to consider taking a statin only if your ASCVD cardiac risk score is at least greater than 7.5% and a strong recommendation if your risk score is greater than 10%.     Low LDL: Normal or low LDL is considered good and having an LDL below the normal range is not of a concern.     Complete Blood Count (CBC):    White blood cells: These are infection fighting cells. Mild fluctuations may represent minor illness at the time of blood draw. Marked fluctuations can represent immune diseases. This can also be elevated from smoking.     High WBC: Elevation in wbc can commonly be caused by an infection, steroid use, or any cause of inflammation such as many rheumatologic diseases, surgery, or trauma.      Low WBC: Many different things can cause this such as infections, medications, rheumatologic disorders, malignancies, nutritional deficiencies, and a normal variant in some individuals. If this occurs it is common practice to rule out a few viruses such  as HIV, Hep C, B12, folate along with a a peripheral blood smear to look for abnormalities. If you are otherwise healthy with no concerning symptoms and the workup is negative we usually monitor it with yearly blood work.  If there are other abnormal cell line abnormalities sometimes we refer to hematology to further evaluate.    Hemoglobin: A key indicator for anemia. Ranges depend on age. If you are significantly out of this range, we may need to talk with you.    High Hemoglobin: This can be elevated in some blood disorders, sleep apnea, chronic lung disease, kidney disease, testosterone use, dehydration, smoking, along with some other rare causes.     Low Hemoglobin: Many things can lead to this but the most common cause is an iron deficiency in females who lose blood during their periods, decreased absorption due to antacids, poor diet, sickle cell, anemia of chronic disease, malignancy, bleeding from the gut, along with some other less common causes. If you are a young female who has periods it is usually assumed that this is from that blood loss unless other symptoms or abnormal blood work is present. If you are above 45 and have an iron deficiency it is always recommended to get a colonoscopy to ensure that there is no lesion causing blood loss and to exclude malignancy.     Hematocrit: Another way of looking at anemia, much like your hemoglobin. If you are significantly out of this range, we may need to talk with you.    MCV: This looks at the size of your red blood cells.     High MCV:  A large number may indicate a B-12 deficiency, folate deficiency, alcohol use, liver disease, medication-induced phenomenon, or a need for further workup.    Low MCV: A small number may imply iron anemia or genetic disorder such as alpha-thalassemia. We may check iron studies called to see if you are low.    MCH: Much like MCV above.    Platelets: These are clotting factors. We tolerate a broad range in this. If your  numbers are less than 100, we may need to work it up.     High Platelet Count: If your numbers are elevated, this could represent ongoing inflammation within the body which can be caused by any infection, illness, iron deficiency, or other malignancy. We usually recheck it to monitor for improvement and if it does not resolve will work it up with more blood work.     Low Platelet Count: Many things can cause this such as infections, alcohol use, medications, liver disease, vitamin deficiencies, aspleenia, and some other rare blood disorders. If it persists many times we refer to hematology if a cause is not identified.     Iron:  This is not a reliable blood marker since this fluctuates throughout the day and if you are fasting many times your iron level in your blood is low but this does not necessarily mean you have a deficiency.  There are more reliable ways to measure your iron stores and these are discussed below.    Transferrin:  Many things can cause an abnormal result and this is not as important as some other labs mentioned below.    TIBC:  This is the total iron-binding capacity and this measures the total amount of iron that can be bound by proteins in the blood.  If you have an elevated TIBC this is a good marker that you could be low on iron and this is useful blood marker.  A simple way to think of this is seats in a car. The TIBC is the number of open seats that iron can sit in. If you have a high TIBC (number of open seats) that means you have a low iron level.     Ferritin:  A low ferritin is almost always caused from an iron deficiency.  A normal ferritin level does not need necessarily exclude an iron deficiency since many inflammatory conditions such as kidney disease, diabetes, arthritis, and obesity can raise this level hiding an iron deficiency.  If you are healthy with no inflammatory conditions this is a very reliable test for detecting an iron deficiency since nothing else lowers your  ferritin level except a low iron level. Keep in mind that you can have an iron deficiency if your ferritin level is normal but your TIBC is elevated.  If you have a low iron level the next step is always to identify why you have a low iron level.    CMP (Comprehensive Metabolic Panel):  Broadly, this is a test of organ function including the kidneys, liver, and electrolyte levels.    Glucose: This is primarily looking for diabetes. We like your blood glucose level to be less than 100 when fasting. Readings of 100-125 indicate what we call 'pre-diabetes' or 'glucose intolerance.' This does not necessarily indicate diabetes, but we may check another test called a hemoglobin A1C for confirmation. This level puts you at great risk for becoming a true diabetic and we would encourage the reduction of simple sugars and processed white flour as well as appropriate weight loss. If this number is greater than 125, it is likely you are diabetic; we will get an additional hemoglobin A1C test and will likely schedule you for an appointment. If you notice that your blood glucose is above 125 and we have not scheduled a follow-up appointment, please call us. Patients who are known diabetics can have readings greater than 125.    Urea Nitrogen: This is a kidney function and maybe elevated because of mild dehydration or because of excessive muscle breakdown from aggressive exercise habits.    Creatinine: This also is a kidney function test. It may be mildly elevated if you have particularly large muscle bulk or taking a supplement like creatine. It is related to the GFR. It is a muscle product that we track to look at your kidney function. If this is elevated and new, we may need to talk with you. If you have had this mildly elevated in the past, it is likely that we will just track it to ensure that it does not worsen quickly. Some medications may gently worsen this, namely blood pressure pills called ace inhibitors. To some  degree, we will permit levels of up to 1.6.    Sodium: This is essentially the concentration of salt within the body. This may be mildly low because of dehydration, overhydration, diuretics, .    Potassium: This is an electrolyte that can cause muscular cramping or cardiac difficulties. It is sometimes lowered by diuretic medications.    Chloride: For the most part, this is only relevant if the other electrolytes are abnormal.    CO2: This is a function of acid balance within the body. For the most part, mild abnormalities are not important and may represent a starvation or dehydration state when blood was drawn. Many medications can change this level as well such as diuretics, acetazolamide, calcium carbonate, laxatives, aspirin, and many others.    High CO2: Many things can cause this which includes dehydration, sleep apnea, and COPD.     Low CO2: Many things can lead to a low level and if you are generally healthy we are usually  not concerned. Diarrhea, renal disease, diabetes, and many medications can cause this.     Anion Gap: Only relevant if your CO2 is abnormal.    Calcium: This is not related to dietary intake of calcium. It may fluctuate gently based on the amount of protein within your body. If it is above 10.9, we may need to do additional testing. Many times if low the albumin level is low and once the corrected calcium level is calculated the calcium is in a normal range.     Total protein: This looks at protein within the body. Markedly elevated levels can represent an immune response and may require further workup.    Albumin: This may be elevated because of particularly high level of fitness. If it is markedly suppressed, it may represent organ dysfunction, particularly in the liver or kidneys.    AST and ALT: These are enzymes in the liver and if elevated can indicate liver damage.     Elevated AST/ALT: Many things can caused elevated liver enzymes and the most common reason is viral infections,  alcohol use, medications, supplements, autoimmune, along with some other rare causes. One of the most common reasons for a mild elevation in liver enzymes (less than 3 times the upper limit) is fatty liver disease. Many individuals who have extra fat in their diet store this in the liver and this can build up and cause a mild elevation. This is usually diagnosed with a liver ultrasound and exclusion of other causes. The only treatment for this is diet and exercise along with avoidance of liver toxic medications and alcohol. It is standard of care to rule our viral hepatitis and get imaging of the liver if elevated. This is monitored and if I feel concerned will refer to hepatology.     Alk Phos: Part of liver function or bones    High Alk Phos: elevated levels may indicate a liver injury or obstruction of bile flow. Elevated levels can also be seen in vitamin D deficiency, drug induced, or bone disorders.     Low Alk Phos: In most cases having a low Alkaline Phosphatase enzyme activity is not due to any disease and is simply a normal variant.  Having an elevated Alk Phos is more concerning and associated with many diseases and thus I would not be overly concerned with a low level which is seen in many health individuals. The reasons for a low serum alkaline phosphatase activity were reviewed in a 1-yr retrospective study and in this study it was found that no cause was found in most cases.  Low activity values were recorded in several individuals in the absence of any obvious cause. This would suggest that the definition of the lower limit of the reference range for alkaline phosphatase is arbitrary, thus limiting the use of low serum activity as a marker of disease.  In some cases micronutrients like Zinc (Zn) and Magnesium (Mg) are causes of low ALP activity and you can take zinc or magnesium supplements. Unfortunately, the blood work to measure zinc and magnesium levels are unreliable and not very accurate since  it does not test for the intracellular zinc or magnesium levels.     Ruby FRANCIS, Bhanu BUSTILLOS, Ana Cristina MONK. Clinical significance of a low serum alkaline phosphatase. [facility] J Med. 1992 Feb;40(1-2):9-14. PMID: [id number].  Jake FERNÁNDEZ, Phillip B, Whit I, Behera S, Jake S. Low Alkaline Phosphatase (ALP) In Adult Population an Indicator of Zinc (Zn) and Magnesium (Mg) Deficiency. Curr Res Nutr Food Sci 2017;5(3). doi : http://dx.[email or web address]    Total Bilirubin: This is also part of liver function.    High T Bili: If you have right upper quadrant pain an elevation in total bilirubin can be caused by a gallbladder stone that is blocking the biliary tract that leads to your gastrointestinal tract. If you have fever and right upper quadrant pain this can sometimes be elevated when your gallbladder is infected and most individuals have nausea with vomiting associated with it. If you have no symptoms and are otherwise healthy this can be caused by Gilbert syndrome which is a benign normal variant. There are other causes such as some anemias but there would be abnormal blood counts.     Low T Bili: Nothing to be concerned about.     Thyroid Stimulating Hormone (TSH): This reading is an indicator of your thyroid function. The thyroid regulates energy levels throughout the entire body, affecting almost every organ system. This is an inverse relationship so a high number actually presents a low thyroid. If this is abnormal, we will often check an additional lab called a Free T4 to evaluate this more carefully. Borderline elevations, those of 5-10, can be watched or worked up further. Please do not take the supplement Biotin for at least a week prior to getting your TSH checked since this can lead to false measurement levels.     Prostate Specific Antigen (PSA): (Men only.) This is a prostate cancer screening test, and is no longer a routine screening test. Levels are truly a function of age. Being less than 4 is  "typical for someone more in their 60s. If you are young, it should probably be less than 3. A higher PSA result does not necessarily mean that you have cancer, but may indicate a need for a discussion with your provider. Options include observation to look at rate of rise or prostate biopsy. Not only is the absolute value important, but how much it has changed from previous years. Please ensure that there is not a dramatic rise from previous years.    Please Note: This information is included as a reference to help you better understand your lab results and is not be used for diagnosis.    Frequently asked questions    When should I take my blood pressure medication?    The latest studies show that taking your blood pressure medication at night is the best time. A recent study showed that this prevents more heart attacks and strokes. See the answer below from [place].     "Q. I've taken blood pressure medicines every morning for many years, and they keep my pressure under control. Recently, my doctor recommended taking them at bedtime, instead. Does that make sense?    A. It actually does make sense -- based on recent research. For many years, there have been at least three theoretical reasons for taking blood pressure medicines before bedtime. First, a body system that strongly affects blood pressure, called the renin-angiotensin system, has its peak activity during sleep. Second, circadian rhythms cause differences in the body chemistry at night compared with daytime. Third, most heart attacks occur in the morning, before medicines taken in the morning have a chance to "kick in."" [6].     When should I take my cholesterol medication?    It used to be recommended to take your cholesterol medication at night since the original statins that lowered cholesterol did not last 24 hours and most cholesterol synthesis is done at night. The long acting statins such as atorvastatin and rosuvastatin last 24 hours so they can " "be taken any time during the day. Simvastatin, pravastatin, fluvastatin, and lovastatin are shorter acting and should be taken at bed time.     Can supplements affect my blood work?    Yes they can. A very important supplement to not take for at least a week prior to your blood work is biotin. "Biotin supplement use is common and can lead to the false measurement of thyroid hormone in commonly used assays." [8.]    What are conditions that should not be addressed during a virtual visit?    There are some conditions that should not be evaluated via a virtual visit since optimal care is impossible. Chest pain, shortness of breath, lung conditions, abdominal pain, and any neurological complaint such as headache, dizziness, numbness ect.         When will I get commentary of my blood work?    I review all blood work that you get and I will send out commentary on this via [employer] within 72 hours. In most cases you will get a message from me sooner, but many times not all of the blood work is completed thus I usually wait until all results have returned. If there is a critical abnormality you should be contacted the same day you got blood work.     How frequently do I need to have visits to get controlled substances?    It is standard protocol to have a visit every 3 months if you are taking scheduled substances such as ADHD medications, psychiatric medications, and pain medications. This is to ensure your safety and monitor for any side effects.     When should I bring prior to a visit if I want to lose weight?    I recommend that you make a food diary for a week and fill out what you ate each day. You can bring this form in to your visit and I can look over it to suggest changes that you can make.     Which over the counter medications should I avoid if I have decreased kidney function?    NSAIDs which includes ibuprofen (Advil, Motrin, Nuprin), naproxen (Aleve), meloxicam (Mobic) and diclofenac(Zorvolex, Voltaren) and " ketorolac (Toradol) can damage your kidneys if you take this long term.Tylenol does not affect your kidney and thus is safe as long as you don't have liver disease.     Is there an [facility] pharmacy?     Yes! The [facility] is located on the first floor of the [facility]. The address is listed below. You can get curbside pickup if you call their number at [phone]. One of the many benefits of using the [facility] pharmacy is that the pharmacists can contact me directly if a cheaper alternative is available to save you money. They also see your note to know more about what the medication was prescribed for. I recommend this pharmacy since communication with me is quick in case any confusion arises on your medications.     [facility]  [address]  [place] [address]  Phone: [phone]    Hours:  Monday - Friday  8:00 a.m. - 5:30 a.m.    Why is it not in my best interest to call in order to get an antibiotic?    Medicine is a complex field and many times the correct diagnosis is critical in order to provide the correct care. One of the most important goals of a healthcare provider is to ensure that no dangerous condition is masquerading as a mild illness. Specific questions are very important to obtain during an examination that provide a wealth of information to understand your illness. Health care providers are trained to investigate for signs that can be dangerous to your health. Messaging or calling the office in order to get an antibiotic can be very dangerous.     For example, many urinary tract infections can lead to an infection in the kidney that can result in a serious blood stream infection that can lead to hospitalization if not recognized. A cough can be caused by many different things and not necessarily an infection. It is not uncommon that one assumes a cough is from an infection when it is actually caused by a blood clot in the lungs. This can lead to death. Determining your risk can only  be performed after a thorough history and examination. A few sentences through e-mail is not enough.     What are some common symptoms that should be evaluated by the emergency department and not by phone or e-mail?    This does not include every symptom, but common examples of symptoms that should prompt one to go to the emergency department are chest pain, chest pressure, shortness of breath, difficulty breathing, abdominal pain, weakness or numbness or an extremity, sudden weakness or drooping on one side of the body, speaking difficulty, unusual or bad headache (particularly if it started suddenly), head injury, confusion, seizure, passing out, lightheaded, pain in arm or jaw, suddenly not able to speak, see, walk, or move, dizziness, neck stiffness, suicidal thoughts, testicular pain, cuts and wounds, severe pain, along with many others. This is not an inclusive list.     Outside Records    It is common to have an colonoscopy, mammogram, PAP smear, or eye exam done outside the [facility] system. Many times we do not get the records automatically sent to us. Please call your provider's office to notify them to fax us your records so that we can have the most up to date information. Your provider will review your outside results in order to provide you with the complete care that you deserve. We appreciate that you decided to choose us to be serving on your healthcare team and the more information we have about your health is essential.          Wishing you good health,     Charlie Gu MD     References:  1-[email or web address]  2-https://www.cancercouncil.com.au/news/yfiwp-iut-02-cancers-that-can-xz-yfjdvt-sd-smoking/  3-https://www.cdc.gov/cancer/lung/basic_info/screening.htm  4-https://newsroom.heart.org/news/common-hypertension-medications-may-reduce-colorectal-cancer-risk  5-Carlton Menon M, Sawada N, et al. High hemoglobin A1c levels within the non-diabetic range are associated with the  risk of all cancers. Int J Cancer. 2016;138(7):4334-4140. doi:10.1002/ijc.51494  6-https://www.[facility].[place].edu/newsletter_article/the-10-commandments-of-cancer-prevention  7-https://www.[facility].[place].[facility]/diseases-and-conditions/should-i-take-blood-pressure-medications-at-night  8-Suki BORRERO et al 2018 Prevalence of biotin supplement usage in outpatients and plasma biotin concentrations in patients presenting to the emergency department. Clin Biochem. Epub 2018 Jul 20. PMID: [id number].

## 2022-03-10 NOTE — PROGRESS NOTES
[facility] Note    Subjective:    Chief Complaint:   Chief Complaint   Patient presents with    [facility] [place] [facility]    Hypertension       274}    56 y.o. female presents for multiple issues.     The HPI and pertinent ROS is included in the Diagnostic Impression Remarks section at the end of the note. Please see below for further details.     The following portions of the patient's history were reviewed and updated as appropriate: allergies, current medications, past family history, past medical history, past social history, past surgical history and problem list.    She  has a past medical history of Back spasm (6/7/2018), High cholesterol, Hormone replacement therapy (HRT) (6/7/2018), HTN (hypertension) (6/7/2018), Hyperlipidemia, mixed (6/7/2018), Hypertension, Insomnia (6/7/2018), and Rosacea (6/7/2018).  She  has a past surgical history that includes Plantar fascia surgery (Left); Gallbladder surgery; Appendectomy; right knee arthroscopy; Shoulder surgery (Right); Breast surgery (Left); Total abdominal hysterectomy w/ bilateral salpingoophorectomy; nephrolithiasis; Colonoscopy (N/A, 4/12/2019); Hysterectomy; Arthroscopy of knee (Right, 12/27/2019); Chondroplasty of knee (Right, 12/27/2019); and Breast biopsy (Left, 15 yrs ago).    She  reports that she has never smoked. She has never used smokeless tobacco. She reports current alcohol use. She reports that she does not use drugs.  She family history includes Bone cancer in her maternal aunt; Breast cancer in her maternal aunt; Diabetes type II in her father; Hypertension in her father and mother; Polycystic kidney disease in her brother; Stroke (age of onset: 51) in her father.    Review of patient's allergies indicates:   Allergen Reactions    Hydrocodone Hives    Ciprofloxacin Hives    Tetanus vaccines and toxoid Hives and Swelling     Hives and arm was very swollen--as an adult       Physical Examination  /68 (BP Location: Right arm,  "Patient Position: Sitting, BP Method: Medium (Manual))   Pulse 106   Temp 98.3 °F (36.8 °C) (Oral)   Resp 16   Ht 5' 1" (1.549 m)   Wt 73 kg (160 lb 15 oz)   SpO2 97%   BMI 30.41 kg/m²    274}  Wt Readings from Last 3 Encounters:   03/10/22 73 kg (160 lb 15 oz)   03/19/21 72.9 kg (160 lb 11.5 oz)   09/18/20 71 kg (156 lb 8.4 oz)     BP Readings from Last 3 Encounters:   03/10/22 110/68   03/19/21 118/74   09/04/20 136/70                Estimated body mass index is 30.41 kg/m² as calculated from the following:    Height as of this encounter: 5' 1" (1.549 m).    Weight as of this encounter: 73 kg (160 lb 15 oz).     General appearance: alert, cooperative, no distress  Neck: no thyromegaly, no neck stiffness  Lungs: clear to auscultation, no wheezes, rales or rhonchi, symmetric air entry  Heart: normal rate, regular rhythm, normal S1, S2, no murmurs, rubs, clicks or gallops  Abdomen: soft, nontender, nondistended, no rigidity, rebound, or guarding.   Back: no point tenderness over spine  Extremities: peripheral pulses normal, no unilateral leg swelling or calf tenderness   Neurological:alert, oriented, normal speech, no new focal findings or movement disorder noted from baseline    Data reviewed 274}  Previous medical records reviewed and summarized in HPI.     Laboratory  { Labs Micro GFR   :[id number]::"   "}274}  I have reviewed old labs below:  Lab Results   Component Value Date    WBC 6.88 09/28/2021    HGB 11.9 (L) 09/28/2021    HCT 36.2 (L) 09/28/2021    MCV 90 09/28/2021     09/28/2021     09/28/2021    K 4.0 09/28/2021     09/28/2021    CALCIUM 9.2 09/28/2021    CO2 22 (L) 09/28/2021    GLU 86 09/28/2021    BUN 18 09/28/2021    CREATININE 0.8 09/28/2021    ANIONGAP 11 09/28/2021    ESTGFRAFRICA >60.0 09/28/2021    EGFRNONAA >60.0 09/28/2021    PROT 7.0 09/28/2021    ALBUMIN 3.9 09/28/2021    BILITOT 0.2 09/28/2021    ALKPHOS 76 09/28/2021    ALT 23 09/28/2021    AST 17 09/28/2021    CHOL " "150 09/28/2021    TRIG 156 (H) 09/28/2021    HDL 42 09/28/2021    LDLCALC 76.8 09/28/2021    TSH 1.567 06/07/2018     Lab reviewed by me: Particular labs of significance that I will monitor, workup, or treat to improve are mentioned below in diagnostic impression remarks.  :[id number]}274}  Imaging/EKG: I have reviewed the pertinent results/findings and my personal findings are noted below in diagnostic impression remarks.  274}    Assessment/Plan  Karlene Damon is a 56 y.o. female who presents to clinic with:    1. Encounter for preventive health examination    2. Essential hypertension    3. Hyperlipidemia, mixed    4. Insomnia, unspecified type    5. Normocytic anemia    6. Abnormal finding of blood chemistry, unspecified    7. Rosacea    8. Leg cramps    9. Colon cancer screening        Diagnostic Impression Remarks + HPI     Documentation entered by me for this encounter may have been done in part using speech-recognition technology. Although I have made an effort to ensure accuracy, "sound like" errors may exist and should be interpreted in context.      Preventive-will get blood work and follow-up on this will put in referral for colonoscopy   Hypertension-well controlled will continue current medications   Hyperlipidemia-stable continue healthy diet no change and due to blood tube shortage will hold off on this blood work   Rosacea-stable continue Metrogel   Normocytic anemia will check iron B12 folate follow-up on this   Knee arthritis-recommend can take NSAIDs p.r.n. can also try some supplements and monitor    This is the extent of the patient's complaints at this present time. She denies chest pain upon exertion, dyspnea, nausea, vomiting, diaphoresis, and syncope. No pleuritic chest pain, unilateral leg swelling, calf tenderness, or calf pain.     Karlene will return to clinic in a few months for further workup and reassessment or sooner as needed. She was instructed to call the clinic or go to " "the emergency department if her symptoms do not improve, worsens, or if new symptoms develop. As we discussed that symptoms could worsen over the next 24 hours she was advised that if any increased swelling, pain, or numbness arise to go immediately to the ED. Patient knows to call any time if an emergency arises. Shared decision making occurred and she verbalized understanding in agreement with this plan.     274}    BMI Goal    Counseled patient on her ideal body weight, health consequences of being obese and current recommendations including weekly exercise and a heart healthy diet.  She is aware that ideal BMI < 25  Estimated body mass index is 30.41 kg/m² as calculated from the following:    Height as of this encounter: 5' 1" (1.549 m).    Weight as of this encounter: 73 kg (160 lb 15 oz).     She was counseled about the importance of healthy dietary habits as well as routine physical activity and exercise for better health outcomes. I also discussed the importance of cancer screening.     Medication Monitoring    In today's visit, monitoring for drug toxicity was accomplished. Proper use of medications was also discussed.     Risks of Ambien discussed including grogginess, balance impairment, memory lapses, hallucinations, sleep walking, driving while asleep, dependence, and psychiatric complications. After a discussion of the risks and benefits, we feel that because the patient's insomnia failed to improve with good sleep hygiene, other first line treatments, poor quality of life without this medication, and disruption in their work/life balance that the benefits exceed the risks at this point in time. After understanding of the risks and shared decision making, the patient decided to pursue taking a sedative-hypnotic to treat their insomnia. The risks of having limited amount of sleep leading to impairment of driving is substantial and thus improving sleep time will likely prevent harm due to lack of sleep. "  reviewed and refill deemed appropriate.       I discussed imaging findings, diagnosis, possibilities, treatment options, medications, risks, and benefits. She had many questions regarding the options and long-term effects. All questions were answered. She expressed understanding after counseling regarding the diagnosis and recommendations. She was capable and demonstrated competence with understanding of these options. Shared decision making was performed resulting in her choosing the current treatment plan. Patient handout was given with instructions and recommendations. Advised the patient that if they become pregnant to alert us immediately to assess for medication changes.     I also discussed the importance of close follow up to discuss labs, change or modify her medications if needed, monitor side effects, and further evaluation of medical problems.     Additional workup planned: see labs ordered below.    See below for labs and meds ordered with associated diagnosis      1. Encounter for preventive health examination    2. Essential hypertension    3. Hyperlipidemia, mixed    4. Insomnia, unspecified type  - zolpidem (AMBIEN) 10 mg Tab; Take 1 tablet (10 mg total) by mouth nightly as needed (insomnia).  Dispense: 30 tablet; Refill: 5    5. Normocytic anemia  - Iron and TIBC; Future  - Ferritin; Future  - Folate; Future  - Vitamin B12 Deficiency Panel; Future  - CBC Auto Differential; Future    6. Abnormal finding of blood chemistry, unspecified  - Iron and TIBC; Future  - Ferritin; Future  - Folate; Future  - Vitamin B12 Deficiency Panel; Future  - Comprehensive Metabolic Panel; Future  - CBC Auto Differential; Future    7. Rosacea    8. Leg cramps  - Magnesium; Future    9. Colon cancer screening  - Ambulatory referral/consult to Gastroenterology; Future    Medication List with Changes/Refills   Current Medications    AMLODIPINE-BENAZEPRIL 10-20MG (LOTREL) 10-20 MG PER CAPSULE    TAKE 1 CAPSULE BY MOUTH  EVERY DAY    CETIRIZINE (ZYRTEC) 10 MG TABLET    Take 10 mg by mouth once daily.    CYCLOBENZAPRINE (FLEXERIL) 10 MG TABLET    Take 1 tablet (10 mg total) by mouth nightly as needed.    FLUTICASONE (FLONASE) 50 MCG/ACTUATION NASAL SPRAY    1 spray by Each Nostril route as needed.     MAGNESIUM 250 MG TAB    Take 250 mg by mouth once daily.    NAPROXEN SODIUM (ANAPROX) 220 MG TABLET    Take 220 mg by mouth every 12 (twelve) hours.    OMEGA 3-DHA-EPA-FISH OIL 1,000 MG (120 MG-180 MG) CAP    Take 1 capsule by mouth once daily.    ORACEA 40 MG CAPSULE    TAKE 1 CAPSULE BY MOUTH EVERY DAY    POTASSIUM 99 MG TAB    Take 2 tablets by mouth once daily.    SIMVASTATIN (ZOCOR) 10 MG TABLET    TAKE 1 TABLET BY MOUTH EVERY DAY IN THE EVENING    TRIAMTERENE-HYDROCHLOROTHIAZIDE 37.5-25 MG (DYAZIDE) 37.5-25 MG PER CAPSULE    Take 1 capsule by mouth daily as needed.   Changed and/or Refilled Medications    Modified Medication Previous Medication    METRONIDAZOLE 1% (METROGEL) 1 % GEL metronidazole 1% (METROGEL) 1 % Gel       Apply topically once daily.    Apply topically once daily.    ZOLPIDEM (AMBIEN) 10 MG TAB zolpidem (AMBIEN) 10 mg Tab       Take 1 tablet (10 mg total) by mouth nightly as needed (insomnia).    TAKE 1 TABLET (10 MG TOTAL) BY MOUTH NIGHTLY AS NEEDED (INSOMNIA).   Discontinued Medications    HYDROXYZINE HCL (ATARAX) 25 MG TABLET    SMARTSI By Mouth 4-5 Times Daily     Modified Medications    Modified Medication Previous Medication    METRONIDAZOLE 1% (METROGEL) 1 % GEL metronidazole 1% (METROGEL) 1 % Gel       Apply topically once daily.    Apply topically once daily.    ZOLPIDEM (AMBIEN) 10 MG TAB zolpidem (AMBIEN) 10 mg Tab       Take 1 tablet (10 mg total) by mouth nightly as needed (insomnia).    TAKE 1 TABLET (10 MG TOTAL) BY MOUTH NIGHTLY AS NEEDED (INSOMNIA).       Charlie Gu MD   274}  03/10/2022     This note was completed with dictation software and grammatical errors may exist.    If you are  due for any health screening(s) below please notify me so we can arrange them to be ordered and scheduled to maintain your health.     Health Maintenance Due   Topic Date Due    Colorectal Cancer Screening  04/12/2022

## 2022-03-12 LAB — VIT B12 SERPL-MCNC: 271 NG/L (ref 180–914)

## 2022-03-15 ENCOUNTER — TELEPHONE (OUTPATIENT)
Dept: FAMILY MEDICINE | Facility: CLINIC | Age: 57
End: 2022-03-15
Payer: COMMERCIAL

## 2022-03-15 LAB — METHYLMALONATE SERPL-SCNC: 0.14 NMOL/ML

## 2022-03-15 NOTE — TELEPHONE ENCOUNTER
Colonoscopy: spoke with patient. Not ready to schedule. Contact information sent via portal for when she is ready.

## 2022-03-21 ENCOUNTER — TELEPHONE (OUTPATIENT)
Dept: GASTROENTEROLOGY | Facility: CLINIC | Age: 57
End: 2022-03-21
Payer: COMMERCIAL

## 2022-03-21 NOTE — TELEPHONE ENCOUNTER
----- Message from Jennie Whitney sent at 3/21/2022 10:34 AM CDT -----  Please call patient to schedule the routine colonoscopy.  Her number is [phone].  Thank you!

## 2022-03-22 ENCOUNTER — PATIENT MESSAGE (OUTPATIENT)
Dept: GASTROENTEROLOGY | Facility: CLINIC | Age: 57
End: 2022-03-22
Payer: COMMERCIAL

## 2022-03-22 ENCOUNTER — TELEPHONE (OUTPATIENT)
Dept: GASTROENTEROLOGY | Facility: CLINIC | Age: 57
End: 2022-03-22
Payer: COMMERCIAL

## 2022-03-22 DIAGNOSIS — Z86.010 HISTORY OF COLON POLYPS: Primary | ICD-10-CM

## 2022-05-07 NOTE — TELEPHONE ENCOUNTER
No new care gaps identified.  [facility] Embedded Care Gaps. Reference number: [id number]. 5/07/2022   3:13:57 PM CDT

## 2022-05-09 ENCOUNTER — ANESTHESIA EVENT (OUTPATIENT)
Dept: ENDOSCOPY | Facility: HOSPITAL | Age: 57
End: 2022-05-09
Payer: COMMERCIAL

## 2022-05-09 ENCOUNTER — ANESTHESIA (OUTPATIENT)
Dept: ENDOSCOPY | Facility: HOSPITAL | Age: 57
End: 2022-05-09
Payer: COMMERCIAL

## 2022-05-09 ENCOUNTER — HOSPITAL ENCOUNTER (OUTPATIENT)
Facility: HOSPITAL | Age: 57
Discharge: HOME | End: 2022-05-09
Attending: INTERNAL MEDICINE | Admitting: INTERNAL MEDICINE
Payer: COMMERCIAL

## 2022-05-09 DIAGNOSIS — K64.8 INTERNAL HEMORRHOIDS: ICD-10-CM

## 2022-05-09 DIAGNOSIS — Z86.010 HISTORY OF COLON POLYPS: ICD-10-CM

## 2022-05-09 DIAGNOSIS — K63.5 POLYP OF COLON, UNSPECIFIED PART OF COLON, UNSPECIFIED TYPE: Primary | ICD-10-CM

## 2022-05-09 PROCEDURE — 37000009 HC ANESTHESIA EA ADD 15 MINS: Performed by: INTERNAL MEDICINE

## 2022-05-09 PROCEDURE — 88305 TISSUE EXAM BY PATHOLOGIST: CPT | Performed by: STUDENT IN AN ORGANIZED HEALTH CARE EDUCATION/TRAINING PROGRAM

## 2022-05-09 PROCEDURE — 88305 TISSUE EXAM BY PATHOLOGIST: CPT | Mod: 26,,, | Performed by: STUDENT IN AN ORGANIZED HEALTH CARE EDUCATION/TRAINING PROGRAM

## 2022-05-09 PROCEDURE — 37000008 HC ANESTHESIA 1ST 15 MINUTES: Performed by: INTERNAL MEDICINE

## 2022-05-09 PROCEDURE — D9220A PRA ANESTHESIA: ICD-10-PCS | Mod: 33,CRNA,, | Performed by: NURSE ANESTHETIST, CERTIFIED REGISTERED

## 2022-05-09 PROCEDURE — 45385 COLONOSCOPY W/LESION REMOVAL: CPT | Mod: PT | Performed by: INTERNAL MEDICINE

## 2022-05-09 PROCEDURE — 45380 COLONOSCOPY AND BIOPSY: CPT | Mod: PT,59 | Performed by: INTERNAL MEDICINE

## 2022-05-09 PROCEDURE — D9220A PRA ANESTHESIA: Mod: 33,ANES,, | Performed by: ANESTHESIOLOGY

## 2022-05-09 PROCEDURE — 63600175 PHARM REV CODE 636 W HCPCS: Performed by: NURSE ANESTHETIST, CERTIFIED REGISTERED

## 2022-05-09 PROCEDURE — 45380 PR COLONOSCOPY,BIOPSY: ICD-10-PCS | Mod: 59,33,, | Performed by: INTERNAL MEDICINE

## 2022-05-09 PROCEDURE — 27201089 HC SNARE, DISP (ANY): Performed by: INTERNAL MEDICINE

## 2022-05-09 PROCEDURE — D9220A PRA ANESTHESIA: Mod: 33,CRNA,, | Performed by: NURSE ANESTHETIST, CERTIFIED REGISTERED

## 2022-05-09 PROCEDURE — 25000003 PHARM REV CODE 250: Performed by: NURSE ANESTHETIST, CERTIFIED REGISTERED

## 2022-05-09 PROCEDURE — D9220A PRA ANESTHESIA: ICD-10-PCS | Mod: 33,ANES,, | Performed by: ANESTHESIOLOGY

## 2022-05-09 PROCEDURE — 27201012 HC FORCEPS, HOT/COLD, DISP: Performed by: INTERNAL MEDICINE

## 2022-05-09 PROCEDURE — 45385 COLONOSCOPY W/LESION REMOVAL: CPT | Mod: 33,,, | Performed by: INTERNAL MEDICINE

## 2022-05-09 PROCEDURE — 45385 PR COLONOSCOPY,REMV LESN,SNARE: ICD-10-PCS | Mod: 33,,, | Performed by: INTERNAL MEDICINE

## 2022-05-09 PROCEDURE — 45380 COLONOSCOPY AND BIOPSY: CPT | Mod: 59,33,, | Performed by: INTERNAL MEDICINE

## 2022-05-09 PROCEDURE — 88305 TISSUE EXAM BY PATHOLOGIST: ICD-10-PCS | Mod: 26,,, | Performed by: STUDENT IN AN ORGANIZED HEALTH CARE EDUCATION/TRAINING PROGRAM

## 2022-05-09 RX ORDER — LIDOCAINE HCL/PF 100 MG/5ML
SYRINGE (ML) INTRAVENOUS
Status: DISCONTINUED | OUTPATIENT
Start: 2022-05-09 | End: 2022-05-09

## 2022-05-09 RX ORDER — CYCLOBENZAPRINE HCL 10 MG
TABLET ORAL
Qty: 90 TABLET | Refills: 0 | Status: SHIPPED | OUTPATIENT
Start: 2022-05-09 | End: 2022-08-01

## 2022-05-09 RX ORDER — PROPOFOL 10 MG/ML
VIAL (ML) INTRAVENOUS
Status: DISCONTINUED | OUTPATIENT
Start: 2022-05-09 | End: 2022-05-09

## 2022-05-09 RX ORDER — SODIUM CHLORIDE 9 MG/ML
INJECTION, SOLUTION INTRAVENOUS CONTINUOUS
Status: DISCONTINUED | OUTPATIENT
Start: 2022-05-09 | End: 2022-05-09 | Stop reason: HOSPADM

## 2022-05-09 RX ADMIN — PROPOFOL 50 MG: 10 INJECTION, EMULSION INTRAVENOUS at 08:05

## 2022-05-09 RX ADMIN — LIDOCAINE HYDROCHLORIDE 100 MG: 20 INJECTION INTRAVENOUS at 08:05

## 2022-05-09 RX ADMIN — PROPOFOL 100 MG: 10 INJECTION, EMULSION INTRAVENOUS at 08:05

## 2022-05-09 NOTE — ANESTHESIA PREPROCEDURE EVALUATION
05/09/2022  Karlene Damon is a 56 y.o., female.    Pre-op Assessment    I have reviewed the Patient Summary Reports.    I have reviewed the Nursing Notes. I have reviewed the NPO Status.   I have reviewed the Medications.     Review of Systems  Anesthesia Hx:  No problems with previous Anesthesia    Social:  Non-Smoker    Hematology/Oncology:  Hematology Normal   Oncology Normal     EENT/Dental:EENT/Dental Normal   Cardiovascular:   Hypertension hyperlipidemia    Pulmonary:  Pulmonary Normal    Renal/:  Renal/ Normal     Hepatic/GI:   Bowel Prep.    Musculoskeletal:   Arthritis     Neurological:  Neurology Normal    Dermatological:  Skin Normal    Psych:  Psychiatric Normal           Physical Exam  General:  Well nourished      Airway/Jaw/Neck:  Airway Findings: Mouth Opening: Normal   Tongue: Normal   General Airway Assessment: Adult Mallampati: I  TM Distance: Normal, at least 6 cm         Eyes/Ears/Nose:  EYES/EARS/NOSE FINDINGS: Normal   Dental:  DENTAL FINDINGS: Normal   Chest/Lungs:  Chest/Lungs Findings: Clear to auscultation, Normal Respiratory Rate      Heart/Vascular:  Heart Findings: Rate: Normal  Rhythm: Regular Rhythm        Mental Status:  Mental Status Findings:  Cooperative, Alert and Oriented         Anesthesia Plan  Type of Anesthesia, risks & benefits discussed:  Anesthesia Type:  general    Patient's Preference:   Plan Factors:          Intra-op Monitoring Plan: standard ASA monitors  Intra-op Monitoring Plan Comments:   Post Op Pain Control Plan:   Post Op Pain Control Plan Comments:     Induction:   IV  Beta Blocker:  Patient is not currently on a Beta-Blocker (No further documentation required).       Informed Consent: Informed consent signed with the Patient and all parties understand the risks and agree with anesthesia plan.  All questions answered.  Anesthesia consent  signed with patient.  ASA Score: 2     Day of Surgery Review of History & Physical: I have interviewed and examined the patient. I have reviewed the patient's H&P dated:  There are no significant changes.            Ready For Surgery From Anesthesia Perspective.           Physical Exam  General: Well nourished    Airway:  Mallampati: I   Mouth Opening: Normal  TM Distance: Normal, at least 6 cm  Tongue: Normal    Chest/Lungs:  Clear to auscultation, Normal Respiratory Rate    Heart:  Rate: Normal  Rhythm: Regular Rhythm          Anesthesia Plan  Type of Anesthesia, risks & benefits discussed:    Anesthesia Type: general  Intra-op Monitoring Plan: standard ASA monitors  Induction:  IV  Informed Consent: Informed consent signed with the Patient and all parties understand the risks and agree with anesthesia plan.  All questions answered.   ASA Score: 2  Day of Surgery Review of History & Physical: I have interviewed and examined the patient. I have reviewed the patient's H&P dated:     Ready For Surgery From Anesthesia Perspective.       .

## 2022-05-09 NOTE — ANESTHESIA POSTPROCEDURE EVALUATION
Anesthesia Post Evaluation    Patient: Karlene Damon    Procedure(s) Performed: Procedure(s) (LRB):  COLONOSCOPY (N/A)    Final Anesthesia Type: general      Patient location during evaluation: PACU  Patient participation: Yes- Able to Participate  Level of consciousness: sedated and awake  Post-procedure vital signs: reviewed and stable  Pain management: adequate  Airway patency: patent    PONV status at discharge: No PONV  Anesthetic complications: no      Cardiovascular status: blood pressure returned to baseline  Respiratory status: spontaneous ventilation  Hydration status: euvolemic  Follow-up not needed.          Vitals Value Taken Time   /65 05/09/22 0915   Temp 36.7 °C (98.1 °F) 05/09/22 0900   Pulse 82 05/09/22 0915   Resp 16 05/09/22 0915   SpO2 98 % 05/09/22 0915         Event Time   Out of Recovery 09:25:00         Pain/Shayna Score: Shayna Score: 10 (5/9/2022  9:00 AM)

## 2022-05-09 NOTE — TRANSFER OF CARE
"Anesthesia Transfer of Care Note    Patient: Karlene Damon    Procedure(s) Performed: Procedure(s) (LRB):  COLONOSCOPY (N/A)    Patient location: PACU    Anesthesia Type: general    Transport from OR: Transported from OR on 2-3 L/min O2 by NC with adequate spontaneous ventilation    Post pain: adequate analgesia    Post assessment: no apparent anesthetic complications and tolerated procedure well    Post vital signs: stable    Level of consciousness: sedated and responds to stimulation    Nausea/Vomiting: no nausea/vomiting    Complications: none    Transfer of care protocol was followed      Last vitals:   Visit Vitals  /66   Pulse 94   Temp 36.8 °C (98.2 °F) (Skin)   Resp 16   Ht 5' 1" (1.549 m)   Wt 72.1 kg (159 lb)   SpO2 98%   Breastfeeding No   BMI 30.04 kg/m²     "

## 2022-05-09 NOTE — [employer] PATIENT INSTRUCTIONS
Discharge Summary/Instructions after an Endoscopic Procedure  Patient Name: Karlene Damon  Patient MRN: [id number]  Patient YOB: 1965  Monday, May 9, 2022  Dennis Cifuentes MD  Dear patient,  As a result of recent federal legislation (The Federal Cures Act), you may   receive lab or pathology results from your procedure in your [facility]   account before your physician is able to contact you. Your physician or   their representative will relay the results to you with their   recommendations at their soonest availability.  Thank you,  RESTRICTIONS:  During your procedure today, you received medications for sedation.  These   medications may affect your judgment, balance and coordination.  Therefore,   for 24 hours, you have the following restrictions:   - DO NOT drive a car, operate machinery, make legal/financial decisions,   sign important papers or drink alcohol.    ACTIVITY:  Today: no heavy lifting, straining or running due to procedural   sedation/anesthesia.  The following day: return to full activity including work.  DIET:  Eat and drink normally unless instructed otherwise.     TREATMENT FOR COMMON SIDE EFFECTS:  - Mild abdominal pain, nausea, belching, bloating or excessive gas:  rest,   eat lightly and use a heating pad.  - Sore Throat: treat with throat lozenges and/or gargle with warm salt   water.  - Because air was used during the procedure, expelling large amounts of air   from your rectum or belching is normal.  - If a bowel prep was taken, you may not have a bowel movement for 1-3 days.    This is normal.  SYMPTOMS TO WATCH FOR AND REPORT TO YOUR PHYSICIAN:  1. Abdominal pain or bloating, other than gas cramps.  2. Chest pain.  3. Back pain.  4. Signs of infection such as: chills or fever occurring within 24 hours   after the procedure.  5. Rectal bleeding, which would show as bright red, maroon, or black stools.   (A tablespoon of blood from the rectum is not serious, especially if    hemorrhoids are present.)  6. Vomiting.  7. Weakness or dizziness.  GO DIRECTLY TO THE NEAREST EMERGENCY ROOM IF YOU HAVE ANY OF THE FOLLOWING:      Difficulty breathing              Chills and/or fever over 101 F   Persistent vomiting and/or vomiting blood   Severe abdominal pain   Severe chest pain   Black, tarry stools   Bleeding- more than one tablespoon   Any other symptom or condition that you feel may need urgent attention  Your doctor recommends these additional instructions:  If any biopsies were taken, your doctors clinic will contact you in 1 to 2   weeks with any results.  - Patient has a contact number available for emergencies.  The signs and   symptoms of potential delayed complications were discussed with the   patient.  Return to normal activities tomorrow.  Written discharge   instructions were provided to the patient.   - High fiber diet.   - Continue present medications.   - Await pathology results.   - Repeat colonoscopy in 5 years for surveillance.   - Discharge patient to home (ambulatory).   - Return to my office PRN.  For questions, problems or results please call your physician - Dennis Cifuentes MD at Work:  [phone].  [facility] EM[facility] RO[facility] PHONE NUMBER: [phone]  IF A COMPLICATION OR EMERGENCY SITUATION ARISES AND YOU ARE UNABLE TO REACH   YOUR PHYSICIAN - GO DIRECTLY TO THE EMERGENCY ROOM.  Dennis Cifuentes MD  5/9/2022 8:56:55 AM  This report has been verified and signed electronically.  Dear patient,  As a result of recent federal legislation (The Federal Cures Act), you may   receive lab or pathology results from your procedure in your [facility]   account before your physician is able to contact you. Your physician or   their representative will relay the results to you with their   recommendations at their soonest availability.  Thank you,  [employer]ATION

## 2022-05-09 NOTE — H&P
CC: History of colon polyps - last scope 2019    56 year old female with above. States that symptoms are absent, no alleviating/exacerbating factors. No family history of colorectal CA. Positive personal history of polyps. No bleeding or weight loss.     ROS:  No headache, no fever/chills, no chest pain/SOB, no nausea/vomiting/diarrhea/constipation/GI bleeding/abdominal pain, no dysuria/hematuria.    VSSAF   Exam:   Alert and oriented x 3; no apparent distress   PERRLA, sclera anicteric  CV: Regular rate/rhythm, normal PMI   Lungs: Clear bilaterally with no wheeze/rales   Abdomen: Soft, NT/ND, normal bowel sounds   Ext: No cyanosis, clubbing     Impression:   As above    Plan:   Proceed with endoscopy. Further recs to follow.

## 2022-05-09 NOTE — PLAN OF CARE
Pt POC explained, v/u. Discharge instructions reviewed, v/u. All questions answered. No prescriptions given. Pt discharged via wheelchair with PCT to private vehicle with family member in stable condition.

## 2022-05-10 VITALS
RESPIRATION RATE: 16 BRPM | BODY MASS INDEX: 30.02 KG/M2 | HEIGHT: 61 IN | WEIGHT: 159 LBS | HEART RATE: 82 BPM | DIASTOLIC BLOOD PRESSURE: 65 MMHG | OXYGEN SATURATION: 98 % | SYSTOLIC BLOOD PRESSURE: 104 MMHG | TEMPERATURE: 98 F

## 2022-05-18 LAB
FINAL PATHOLOGIC DIAGNOSIS: NORMAL
GROSS: NORMAL
Lab: NORMAL
MICROSCOPIC EXAM: NORMAL

## 2022-09-07 DIAGNOSIS — G47.00 INSOMNIA, UNSPECIFIED TYPE: ICD-10-CM

## 2022-09-07 NOTE — TELEPHONE ENCOUNTER
No new care gaps identified.  [facility] Embedded Care Gaps. Reference number: [id number]. 9/07/2022   5:33:04 PM CDT

## 2022-09-08 RX ORDER — ZOLPIDEM TARTRATE 10 MG/1
TABLET ORAL
Qty: 30 TABLET | OUTPATIENT
Start: 2022-09-08

## 2022-09-08 RX ORDER — ZOLPIDEM TARTRATE 10 MG/1
TABLET ORAL
Qty: 30 TABLET | Refills: 0 | Status: SHIPPED | OUTPATIENT
Start: 2022-09-08 | End: 2022-09-12

## 2022-09-08 RX ORDER — ZOLPIDEM TARTRATE 10 MG/1
TABLET ORAL
Qty: 30 TABLET | Refills: 0 | Status: SHIPPED | OUTPATIENT
Start: 2022-11-07 | End: 2022-09-12

## 2022-09-08 RX ORDER — ZOLPIDEM TARTRATE 10 MG/1
TABLET ORAL
Qty: 30 TABLET | Refills: 0 | Status: SHIPPED | OUTPATIENT
Start: 2022-10-08 | End: 2022-09-12

## 2022-09-08 NOTE — TELEPHONE ENCOUNTER
Risks of Ambien discussed including grogginess, balance impairment, memory lapses, hallucinations, sleep walking, driving while asleep, dependence, and psychiatric complications. After a discussion of the risks and benefits, we feel that because the patient's insomnia failed to improve with good sleep hygiene, other first line treatments, poor quality of life without this medication, and disruption in their work/life balance that the benefits exceed the risks at this point in time. After understanding of the risks and shared decision making, the patient decided to pursue taking a sedative-hypnotic to treat their insomnia. The risks of having limited amount of sleep leading to impairment of driving is substantial and thus improving sleep time will likely prevent harm due to lack of sleep.  reviewed and refill deemed appropriate.

## 2022-09-12 ENCOUNTER — LAB VISIT (OUTPATIENT)
Dept: LAB | Facility: HOSPITAL | Age: 57
End: 2022-09-12
Attending: STUDENT IN AN ORGANIZED HEALTH CARE EDUCATION/TRAINING PROGRAM
Payer: COMMERCIAL

## 2022-09-12 ENCOUNTER — OFFICE VISIT (OUTPATIENT)
Dept: FAMILY MEDICINE | Facility: CLINIC | Age: 57
End: 2022-09-12
Payer: COMMERCIAL

## 2022-09-12 VITALS
HEIGHT: 61 IN | BODY MASS INDEX: 31.23 KG/M2 | WEIGHT: 165.38 LBS | HEART RATE: 99 BPM | DIASTOLIC BLOOD PRESSURE: 84 MMHG | OXYGEN SATURATION: 98 % | SYSTOLIC BLOOD PRESSURE: 114 MMHG | RESPIRATION RATE: 15 BRPM | TEMPERATURE: 99 F

## 2022-09-12 DIAGNOSIS — G47.09 OTHER INSOMNIA: ICD-10-CM

## 2022-09-12 DIAGNOSIS — E78.2 HYPERLIPIDEMIA, MIXED: ICD-10-CM

## 2022-09-12 DIAGNOSIS — Z12.31 ENCOUNTER FOR SCREENING MAMMOGRAM FOR MALIGNANT NEOPLASM OF BREAST: ICD-10-CM

## 2022-09-12 DIAGNOSIS — I10 PRIMARY HYPERTENSION: ICD-10-CM

## 2022-09-12 DIAGNOSIS — M19.90 ARTHRITIS: ICD-10-CM

## 2022-09-12 DIAGNOSIS — L71.9 ROSACEA: ICD-10-CM

## 2022-09-12 DIAGNOSIS — Z00.00 ENCOUNTER FOR PREVENTIVE HEALTH EXAMINATION: Primary | ICD-10-CM

## 2022-09-12 LAB
CHOLEST SERPL-MCNC: 155 MG/DL (ref 120–199)
CHOLEST/HDLC SERPL: 3.8 {RATIO} (ref 2–5)
HDLC SERPL-MCNC: 41 MG/DL (ref 40–75)
HDLC SERPL: 26.5 % (ref 20–50)
LDLC SERPL CALC-MCNC: 70.4 MG/DL (ref 63–159)
NONHDLC SERPL-MCNC: 114 MG/DL
TRIGL SERPL-MCNC: 218 MG/DL (ref 30–150)

## 2022-09-12 PROCEDURE — 4010F ACE/ARB THERAPY RXD/TAKEN: CPT | Mod: CPTII,S$GLB,, | Performed by: STUDENT IN AN ORGANIZED HEALTH CARE EDUCATION/TRAINING PROGRAM

## 2022-09-12 PROCEDURE — 1159F MED LIST DOCD IN RCRD: CPT | Mod: CPTII,S$GLB,, | Performed by: STUDENT IN AN ORGANIZED HEALTH CARE EDUCATION/TRAINING PROGRAM

## 2022-09-12 PROCEDURE — 99999 PR PBB SHADOW E&M-EST. PATIENT-LVL IV: CPT | Mod: PBBFAC,,, | Performed by: STUDENT IN AN ORGANIZED HEALTH CARE EDUCATION/TRAINING PROGRAM

## 2022-09-12 PROCEDURE — 99999 PR PBB SHADOW E&M-EST. PATIENT-LVL IV: ICD-10-PCS | Mod: PBBFAC,,, | Performed by: STUDENT IN AN ORGANIZED HEALTH CARE EDUCATION/TRAINING PROGRAM

## 2022-09-12 PROCEDURE — 3079F PR MOST RECENT DIASTOLIC BLOOD PRESSURE 80-89 MM HG: ICD-10-PCS | Mod: CPTII,S$GLB,, | Performed by: STUDENT IN AN ORGANIZED HEALTH CARE EDUCATION/TRAINING PROGRAM

## 2022-09-12 PROCEDURE — 1159F PR MEDICATION LIST DOCUMENTED IN MEDICAL RECORD: ICD-10-PCS | Mod: CPTII,S$GLB,, | Performed by: STUDENT IN AN ORGANIZED HEALTH CARE EDUCATION/TRAINING PROGRAM

## 2022-09-12 PROCEDURE — 3079F DIAST BP 80-89 MM HG: CPT | Mod: CPTII,S$GLB,, | Performed by: STUDENT IN AN ORGANIZED HEALTH CARE EDUCATION/TRAINING PROGRAM

## 2022-09-12 PROCEDURE — 3074F SYST BP LT 130 MM HG: CPT | Mod: CPTII,S$GLB,, | Performed by: STUDENT IN AN ORGANIZED HEALTH CARE EDUCATION/TRAINING PROGRAM

## 2022-09-12 PROCEDURE — 80061 LIPID PANEL: CPT | Performed by: STUDENT IN AN ORGANIZED HEALTH CARE EDUCATION/TRAINING PROGRAM

## 2022-09-12 PROCEDURE — 3074F PR MOST RECENT SYSTOLIC BLOOD PRESSURE < 130 MM HG: ICD-10-PCS | Mod: CPTII,S$GLB,, | Performed by: STUDENT IN AN ORGANIZED HEALTH CARE EDUCATION/TRAINING PROGRAM

## 2022-09-12 PROCEDURE — 99214 OFFICE O/P EST MOD 30 MIN: CPT | Mod: S$GLB,,, | Performed by: STUDENT IN AN ORGANIZED HEALTH CARE EDUCATION/TRAINING PROGRAM

## 2022-09-12 PROCEDURE — 3008F BODY MASS INDEX DOCD: CPT | Mod: CPTII,S$GLB,, | Performed by: STUDENT IN AN ORGANIZED HEALTH CARE EDUCATION/TRAINING PROGRAM

## 2022-09-12 PROCEDURE — 4010F PR ACE/ARB THEARPY RXD/TAKEN: ICD-10-PCS | Mod: CPTII,S$GLB,, | Performed by: STUDENT IN AN ORGANIZED HEALTH CARE EDUCATION/TRAINING PROGRAM

## 2022-09-12 PROCEDURE — 99214 PR OFFICE/OUTPT VISIT, EST, LEVL IV, 30-39 MIN: ICD-10-PCS | Mod: S$GLB,,, | Performed by: STUDENT IN AN ORGANIZED HEALTH CARE EDUCATION/TRAINING PROGRAM

## 2022-09-12 PROCEDURE — 3008F PR BODY MASS INDEX (BMI) DOCUMENTED: ICD-10-PCS | Mod: CPTII,S$GLB,, | Performed by: STUDENT IN AN ORGANIZED HEALTH CARE EDUCATION/TRAINING PROGRAM

## 2022-09-12 PROCEDURE — 36415 COLL VENOUS BLD VENIPUNCTURE: CPT | Mod: PO | Performed by: STUDENT IN AN ORGANIZED HEALTH CARE EDUCATION/TRAINING PROGRAM

## 2022-09-12 RX ORDER — DOXYCYCLINE 40 MG/1
40 CAPSULE ORAL DAILY
Qty: 90 CAPSULE | Refills: 1 | Status: SHIPPED | OUTPATIENT
Start: 2022-09-12 | End: 2023-03-03

## 2022-09-12 NOTE — PROGRESS NOTES
[facility] Note    Subjective:    The HPI and pertinent ROS is included in the Diagnostic Impression Remarks section at the end of the note. Please see below for further details. Chief complaint is at end of note.     Karlene is a pleasant intelligent patient who is here for evaluation.     Medication List with Changes/Refills   Current Medications    AMLODIPINE-BENAZEPRIL 10-20MG (LOTREL) 10-20 MG PER CAPSULE    TAKE 1 CAPSULE BY MOUTH EVERY DAY    CETIRIZINE (ZYRTEC) 10 MG TABLET    Take 10 mg by mouth once daily.    CYCLOBENZAPRINE (FLEXERIL) 10 MG TABLET    TAKE 1 TABLET BY MOUTH EVERY DAY NIGHTLY AS NEEDED    FLUTICASONE (FLONASE) 50 MCG/ACTUATION NASAL SPRAY    1 spray by Each Nostril route as needed.     MAGNESIUM 250 MG TAB    Take 250 mg by mouth once daily.    METRONIDAZOLE 1% (METROGEL) 1 % GEL    Apply topically once daily.    NAPROXEN SODIUM (ANAPROX) 220 MG TABLET    Take 220 mg by mouth every 12 (twelve) hours.    OMEGA 3-DHA-EPA-FISH OIL 1,000 MG (120 MG-180 MG) CAP    Take 1 capsule by mouth once daily.    POTASSIUM 99 MG TAB    Take 2 tablets by mouth once daily.    SIMVASTATIN (ZOCOR) 10 MG TABLET    TAKE 1 TABLET BY MOUTH EVERY DAY IN THE EVENING    TRIAMTERENE-HYDROCHLOROTHIAZIDE 37.5-25 MG (DYAZIDE) 37.5-25 MG PER CAPSULE    Take 1 capsule by mouth daily as needed.    ZOLPIDEM (AMBIEN) 10 MG TAB    Take 1 tablet (10 mg total) by mouth nightly as needed (insomnia).   Changed and/or Refilled Medications    Modified Medication Previous Medication    DOXYCYCLINE (ORACEA) 40 MG CAPSULE ORACEA 40 mg capsule       Take 1 capsule (40 mg total) by mouth once daily.    TAKE 1 CAPSULE BY MOUTH EVERY DAY   Discontinued Medications    ZOLPIDEM (AMBIEN) 10 MG TAB    Take 10 mg tablet nightly    ZOLPIDEM (AMBIEN) 10 MG TAB    Take 10 mg tablet nightly    ZOLPIDEM (AMBIEN) 10 MG TAB    Take 10 mg tablet nightly     Modified Medications    Modified Medication Previous Medication    DOXYCYCLINE  (ORACEA) 40 MG CAPSULE ORACEA 40 mg capsule       Take 1 capsule (40 mg total) by mouth once daily.    TAKE 1 CAPSULE BY MOUTH EVERY DAY       Data reviewed 274}  Previous medical records reviewed and summarized in plan section at end of note.      If you are due for any health screening(s) below please notify me so we can arrange them to be ordered and scheduled to maintain your health.     All of your core healthy metrics are met.      The following portions of the patient's history were reviewed and updated as appropriate: allergies, current medications, past family history, past medical history, past social history, past surgical history and problem list.    She  has a past medical history of Back spasm (6/7/2018), High cholesterol, Hormone replacement therapy (HRT) (6/7/2018), HTN (hypertension) (6/7/2018), Hyperlipidemia, mixed (6/7/2018), Hypertension, Insomnia (6/7/2018), and Rosacea (6/7/2018).  She  has a past surgical history that includes Plantar fascia surgery (Left); Gallbladder surgery; Appendectomy; right knee arthroscopy; Shoulder surgery (Right); Breast surgery (Left); Total abdominal hysterectomy w/ bilateral salpingoophorectomy; nephrolithiasis; Colonoscopy (N/A, 4/12/2019); Hysterectomy; Arthroscopy of knee (Right, 12/27/2019); Chondroplasty of knee (Right, 12/27/2019); Breast biopsy (Left, 15 yrs ago); and Colonoscopy (N/A, 5/9/2022).    She  reports that she has never smoked. She has never been exposed to tobacco smoke. She has never used smokeless tobacco. She reports current alcohol use. She reports that she does not use drugs.  She family history includes Bone cancer in her maternal aunt; Breast cancer in her maternal aunt; Diabetes type II in her father; Hypertension in her father and mother; Polycystic kidney disease in her brother; Stroke (age of onset: 51) in her father.    Review of patient's allergies indicates:   Allergen Reactions    Hydrocodone Hives    Ciprofloxacin Hives     "Tetanus vaccines and toxoid Hives and Swelling     Hives and arm was very swollen--as an adult       Tobacco Use: Low Risk     Smoking Tobacco Use: Never    Smokeless Tobacco Use: Never     Physical Examination  General appearance: alert, cooperative, no distress  Neck: no thyromegaly, no neck stiffness  Lungs: clear to auscultation, no wheezes, rales or rhonchi, symmetric air entry  Heart: normal rate, regular rhythm, normal S1, S2, no murmurs, rubs, clicks or gallops  Abdomen: soft, nontender, nondistended, no rigidity, rebound, or guarding.   Back: no point tenderness over spine  Extremities: peripheral pulses normal, no unilateral leg swelling or calf tenderness   Neurological:alert, oriented, normal speech, no new focal findings or movement disorder noted from baseline    BP Readings from Last 3 Encounters:   09/12/22 114/84   05/09/22 104/65   03/10/22 110/68     Wt Readings from Last 3 Encounters:   09/12/22 75 kg (165 lb 5.5 oz)   05/09/22 72.1 kg (159 lb)   03/10/22 73 kg (160 lb 15 oz)     /84 (BP Location: Right arm, Patient Position: Sitting, BP Method: Large (Manual))   Pulse 99   Temp 98.5 °F (36.9 °C) (Oral)   Resp 15   Ht 5' 1" (1.549 m)   Wt 75 kg (165 lb 5.5 oz)   SpO2 98%   BMI 31.24 kg/m²    274}  Laboratory: I have reviewed old labs below:    274}    Lab Results   Component Value Date    WBC 7.90 03/10/2022    HGB 12.4 03/10/2022    HCT 39.0 03/10/2022    MCV 92 03/10/2022     03/10/2022     03/10/2022    K 4.4 03/10/2022     03/10/2022    CALCIUM 9.6 03/10/2022    CO2 25 03/10/2022    GLU 86 03/10/2022    BUN 15 03/10/2022    CREATININE 0.8 03/10/2022    ANIONGAP 11 03/10/2022    PROT 7.4 03/10/2022    ALBUMIN 4.1 03/10/2022    BILITOT 0.2 03/10/2022    ALKPHOS 80 03/10/2022    ALT 24 03/10/2022    AST 20 03/10/2022    CHOL 150 09/28/2021    TRIG 156 (H) 09/28/2021    HDL 42 09/28/2021    LDLCALC 76.8 09/28/2021    TSH 1.567 06/07/2018     Lab reviewed by me: " "Particular labs of significance that I will monitor, workup, or treat to improve are mentioned below in diagnostic impression remarks.    Imaging/EKG: I have reviewed the pertinent results and my findings are noted in remarks.  274}    CC:   Chief Complaint   Patient presents with    Follow-up    Hyperlipidemia    Sinus Problem          274}    Assessment/Plan  Karlene Damon is a 57 y.o. female who presents to clinic with:  1. Encounter for preventive health examination    2. Primary hypertension    3. Hyperlipidemia, mixed    4. Rosacea    5. Arthritis    6. Encounter for screening mammogram for malignant neoplasm of breast    7. Other insomnia       274}  Diagnostic Impression Remarks + HPI     Documentation entered by me for this encounter may have been done in part using speech-recognition technology. Although I have made an effort to ensure accuracy, "sound like" errors may exist and should be interpreted in context.     Preventive-needs mammogram will put in order also get lipid level  Hypertension well controlled continue current meds  Hyperlipidemia control uncertain repeat blood work recommend healthy diet continue statin  Rosacea-well control continue doxycycline no side effects  Arthritis-patient has left knee arthritis been bothering her is taking NSAIDs along with topicals try some supplements without relief will monitor    This is the extent of this pleasant patient's concerns at this present time. She did not feel chest pain upon exertion, dyspnea, nausea, vomiting, diaphoresis, or syncope. No pleuritic chest pain, unilateral leg swelling, calf tenderness, or calf pain. Negative for unintentional weight loss night sweats and fevers. Karlene will return to clinic in a few months for further workup and reassessment or sooner as needed. She was instructed to call the clinic or go to the emergency department if her symptoms do not improve, worsens, or if new symptoms develop. As we discussed that " symptoms could worsen over the next 24 hours she was advised that if any increased swelling, pain, or numbness arise to go immediately to the ED. Patient knows to call any time if an emergency arises. Shared decision making occurred and she verbalized understanding in agreement with this plan. I discussed imaging findings, diagnosis, possibilities, treatment options, medications, risks, and benefits. She had many questions regarding the options and long-term effects. All questions were answered. She expressed understanding after counseling regarding the diagnosis and recommendations. She was capable and demonstrated competence with understanding of these options. Shared decision making was performed resulting in her choosing the current treatment plan. Patient handout was given with instructions and recommendations. Advised the patient that if they become pregnant to alert us immediately to assess for medication changes. I also discussed the importance of close follow up to discuss labs, change or modify her medications if needed, monitor side effects, and further evaluation of medical problems.     Additional workup planned: see labs ordered below.    See below for labs and meds ordered with associated diagnosis      1. Encounter for preventive health examination    2. Primary hypertension    3. Hyperlipidemia, mixed  - Lipid Panel; Future    4. Rosacea  - doxycycline (ORACEA) 40 mg capsule; Take 1 capsule (40 mg total) by mouth once daily.  Dispense: 90 capsule; Refill: 1    5. Arthritis    6. Encounter for screening mammogram for malignant neoplasm of breast  - Mammo Digital Screening Bilat; Future    7. Other insomnia    Charlie Gu MD   {  Notes     Chart Review      :[id number]}274}    All of your core healthy metrics are met.

## 2022-09-12 NOTE — PATIENT INSTRUCTIONS
Toby Soler, Please read below to learn more on healthy choices, screenings, and useful information related to your health.  Most of my patients read it. Most of my healthy patients complete their cancer screenings. Don't lose out on improving your health.     Table of Contents:     Overdue health maintenance   Cancer prevention  Explanation on the different components of your blood work and interpretation  Frequently asked questions   Blood pressure log     If you are due for any health screening(s) below please notify me so we can arrange them to be ordered and scheduled to maintain your health. Most healthy patients at your age complete them.     All of your core healthy metrics are met.                              Cancer Prevention    Why should you choose to get screened for cancer? One simple reason is because you are important. You matter and deserve to have the best health so you can fulfill your great potential.       Colon Cancer screening - Most colon cancers can be prevented by screening. In most cases a polyp in the colon can grow and is not cancerous at first, but it can become cancerous years later. A polyp is like a seed that can grow into a weed if it is left in the soil. If the seed is detected and removed, no weed sprouts. A FIT test/Cologuard test and colonoscopy can detect precancerous polyps and lead to the prevention of cancer. A polyp is just like a seed that can be removed before the roots take hold. A colonoscopy can remove these polyps and eliminate the chance that these polyps turn into cancer.     Cervical Cancer screening- A PAP smear can detect precancerous cells that can become cervical cancer and can lead to procedures to remove them. It is very important to get a PAP smear as investing these few minutes can help prevent a lot of trouble down the road. If you want to do the most you can do to spend more time with your family and friends', cancer screenings can help with that  OB POSTPARTUM VISIT PROGRESS NOTE     Chief Complaint   Patient presents with   • Postpartum     Feeling good. Breastfeeding.      Maribeth is a 27 year old year old  who delivered at 39w4d by primary  section on 3/23/21. She is here for her 2 week postpartum visit.   Her delivery was complicated by Failure to Progress in Second Stage [12]  and Fetal arrhythmia during labor . Pain is doing well, has not taken anything for pain yet today. Will still take Norco before she goes to bed. Her postpartum course has been uncomplicated. Her vaginal bleeding is within normal limits, will have some increased bleeding if she is more active. Her bowel and bladder functioning is normal. She denies postpartum depression or blues with Total: 0. Baby is doing well and gaining weight appropriately. After birth baby Cyril has converted to normal rhythm and did not need a echo. The patient is currently breastfeeding. Would like to wait until 6-8 weeks postpartum for her COVID vaccine.  Baby does have a tongue tie, breastfeeding doing better with a nipple shield     I have reviewed the past medical history, family history, social history, medications and allergies listed in the medical record as obtained by my nursing staff and support staff and agree with their documentation.     Weight: 70.3 kg  BP: 112/70     Gen: awake, alert, in no acute distress  Ab: soft, non-tender, incision clean/dry/intact. Glue remains in place     Maribeth was seen today for postpartum.    Diagnoses and all orders for this visit:    Postpartum follow-up     delivery delivered      Options for postpartum family planning were discussed.  These included combination oral contraceptive options, progesterone only pills, implantable contraception, intrauterine devices, barrier methods, sterilization, and the rhythm method.  The patient's questions were answered.  She decided on condoms as her preferred method of contraception.   Follow up: 4  "goal.     Breast Cancer screening- Mammograms can detect breast cancer before it has spread and early detection allows the ability to remove the lesion prior to any spread. It is similar to a small rotten spot on fruit. If the spoiled part is removed quickly it can preserve the rest of the fruit, but if it is left alone it will corrupt the whole peach.     Smoking Cessation- "Smoking is like a chimney. The tar builds up and causes a back draft which leads to a cough. Smoking is like sitting in a car with a blocked exhaust system." Smoking can increase your risk for lung, mouth, throat, nose, esophagus, bladder, kidney, ureter, pancreas, stomach, liver, cervix, ovary, bowel, and leukemia [2]. If you have smoked in the past, you might meet the criteria for a CT lung cancer screening.     Lung Cancer Screening - "The U.S. Preventive Services Task Force (USPSTF) recommendsexternal icon yearly lung cancer screening with LDCT for people who--have a 20 pack-year or more smoking history, and smoke now or have quit within the past 15 years, and are between 50 and 80 years old. A pack-year is smoking an average of one pack of cigarettes per day for one year. For example, a person could have a 20 pack-year history by smoking one pack a day for 20 years or two packs a day for 10 years." [3]    Hypertension - Common high blood pressure meds may lower colorectal cancer risk-Tom Bean, July 6, 2020 - Hypertension Journal Report Medications commonly prescribed to treat high blood pressure may also reduce patients' colorectal cancer risk, according to new research published today in Hypertension, an American Heart Association journal." [4].     Low HbA1c - "Higher HbA1c levels (within both the non?diabetic and diabetic ranges) were associated with the risk of colorectal cancer (Model 2; P linear?=?0.009), especially for colon cancer." [5].    A healthy diet, exercise, limitation of alcohol use, certain infections, avoidance of " weeks for routine postpartum exam       radiation/environmental toxins, and staying lean lowers your cancer risk [6].     I want to empower you to make informed choices for your health. I have a listed below the most common blood components that we check for when you get blood work. I discuss what each component measures along with common reasons for why they can be abnormal. If you are interested in understanding your blood work better, please read the lab explanation attached below.     Explanation of Lab Results    Please note: This information is included as a reference to help you better understand your lab results and is not be used for diagnosis.     Lipid Panel:  Cholesterol is a measure of cardiac risk and stroke risk. A lipid panel measures total cholesterol and provides readings which are broken down into 3 subsections: Triglycerides, HDL and LDL.    Total Cholesterol: Your total blood cholesterol is a measure of LDL cholesterol, HDL cholesterol, and other lipid components.  If your individual lipid level components are in the normal range and you have an elevated total cholesterol level we are generally not to concerned since we primarily look at the LDL cholesterol which is considered the bad cholesterol which can lead to heart attacks and strokes.  Your calculated cardiac risk is the most important factor in deciding if you would benefit from a cholesterol-lowering medication that the total cholesterol level.    Triglycerides are most diet and weight sensitive. They are affected easily by lifestyle changes. Ideally, this reading should be less than 150, although if the remainder of the cholesterol panel is within normal limits, we will tolerate upwards of 250-300. For the most part, if triglycerides are the only part of your cholesterol panel reading as 'abnormal', it is best to lose weight and modify your diet, including less saturated fat and less simple sugars. We only start medication to lower this is the level is greater than 500 since  this can increase ones risk for pancreatitis. This is not the cholesterol type that leads to heart attacks.     HDL is your 'good cholesterol.' Ideally, the reading should be over 40 and is particularly helpful when it is greater than 60. Research indicates that high HDL is extremely protective; and if your HDL level is greater than 60, we tolerate far worse LDL or triglyceride levels. For the most part, HDL is responsive to aerobic activity and ideal weight. We do not have medicines that increase the HDL reading very easily.    LDL is your 'bad cholesterol.' Our goals depend on how many cardiac risk factors you have.     High LDL: If you are diabetic or have had a heart attack, we like the LDL level to be less than 100. If you have 2 cardiac risk factors (such as diabetes, hypertension, family history, a low HDL and smoking), we like your LDL to be less than 130. If you have 0-2 cardiac risk factors, we like your LDL level to be less than 160, but we may not necessarily initiate medications to 190 unless you cannot lower it. The latest guidelines recommend to consider taking a statin only if your ASCVD cardiac risk score is at least greater than 7.5% and a strong recommendation if your risk score is greater than 10%.     Low LDL: Normal or low LDL is considered good and having an LDL below the normal range is not of a concern.     Complete Blood Count (CBC):    White blood cells: These are infection fighting cells. Mild fluctuations may represent minor illness at the time of blood draw. Marked fluctuations can represent immune diseases. This can also be elevated from smoking.     High WBC: Elevation in wbc can commonly be caused by an infection, steroid use, or any cause of inflammation such as many rheumatologic diseases, surgery, or trauma.      Low WBC: Many different things can cause this such as infections, medications, rheumatologic disorders, malignancies, nutritional deficiencies, and a normal variant in  some individuals. If this occurs it is common practice to rule out a few viruses such as HIV, Hep C, B12, folate along with a a peripheral blood smear to look for abnormalities. If you are otherwise healthy with no concerning symptoms and the workup is negative we usually monitor it with yearly blood work.  If there are other abnormal cell line abnormalities sometimes we refer to hematology to further evaluate.    Hemoglobin: A key indicator for anemia. Ranges depend on age. If you are significantly out of this range, we may need to talk with you.    High Hemoglobin: This can be elevated in some blood disorders, sleep apnea, chronic lung disease, kidney disease, testosterone use, dehydration, smoking, along with some other rare causes.     Low Hemoglobin: Many things can lead to this but the most common cause is an iron deficiency in females who lose blood during their periods, decreased absorption due to antacids, poor diet, sickle cell, anemia of chronic disease, malignancy, bleeding from the gut, along with some other less common causes. If you are a young female who has periods it is usually assumed that this is from that blood loss unless other symptoms or abnormal blood work is present. If you are above 45 and have an iron deficiency it is always recommended to get a colonoscopy to ensure that there is no lesion causing blood loss and to exclude malignancy.     Hematocrit: Another way of looking at anemia, much like your hemoglobin. If you are significantly out of this range, we may need to talk with you.    MCV: This looks at the size of your red blood cells.     High MCV:  A large number may indicate a B-12 deficiency, folate deficiency, alcohol use, liver disease, medication-induced phenomenon, or a need for further workup.    Low MCV: A small number may imply iron anemia or genetic disorder such as alpha-thalassemia. We may check iron studies called to see if you are low.    MCH: Much like MCV  above.    Platelets: These are clotting factors. We tolerate a broad range in this. If your numbers are less than 100, we may need to work it up.     High Platelet Count: If your numbers are elevated, this could represent ongoing inflammation within the body which can be caused by any infection, illness, iron deficiency, or other malignancy. We usually recheck it to monitor for improvement and if it does not resolve will work it up with more blood work.     Low Platelet Count: Many things can cause this such as infections, alcohol use, medications, liver disease, vitamin deficiencies, aspleenia, and some other rare blood disorders. If it persists many times we refer to hematology if a cause is not identified.     Iron:  This is not a reliable blood marker since this fluctuates throughout the day and if you are fasting many times your iron level in your blood is low but this does not necessarily mean you have a deficiency.  There are more reliable ways to measure your iron stores and these are discussed below.    Transferrin:  Many things can cause an abnormal result and this is not as important as some other labs mentioned below.    TIBC:  This is the total iron-binding capacity and this measures the total amount of iron that can be bound by proteins in the blood.  If you have an elevated TIBC this is a good marker that you could be low on iron and this is useful blood marker.  A simple way to think of this is seats in a car. The TIBC is the number of open seats that iron can sit in. If you have a high TIBC (number of open seats) that means you have a low iron level.     Ferritin:  A low ferritin is almost always caused from an iron deficiency.  A normal ferritin level does not need necessarily exclude an iron deficiency since many inflammatory conditions such as kidney disease, diabetes, arthritis, and obesity can raise this level hiding an iron deficiency.  If you are healthy with no inflammatory conditions this  is a very reliable test for detecting an iron deficiency since nothing else lowers your ferritin level except a low iron level. Keep in mind that you can have an iron deficiency if your ferritin level is normal but your TIBC is elevated.  If you have a low iron level the next step is always to identify why you have a low iron level.    CMP (Comprehensive Metabolic Panel):  Broadly, this is a test of organ function including the kidneys, liver, and electrolyte levels.    Glucose: This is primarily looking for diabetes. We like your blood glucose level to be less than 100 when fasting. Readings of 100-125 indicate what we call 'pre-diabetes' or 'glucose intolerance.' This does not necessarily indicate diabetes, but we may check another test called a hemoglobin A1C for confirmation. This level puts you at great risk for becoming a true diabetic and we would encourage the reduction of simple sugars and processed white flour as well as appropriate weight loss. If this number is greater than 125, it is likely you are diabetic; we will get an additional hemoglobin A1C test and will likely schedule you for an appointment. If you notice that your blood glucose is above 125 and we have not scheduled a follow-up appointment, please call us. Patients who are known diabetics can have readings greater than 125.    Urea Nitrogen: This is a kidney function and maybe elevated because of mild dehydration or because of excessive muscle breakdown from aggressive exercise habits.    Creatinine: This also is a kidney function test. It may be mildly elevated if you have particularly large muscle bulk or taking a supplement like creatine. It is related to the GFR. It is a muscle product that we track to look at your kidney function. If this is elevated and new, we may need to talk with you. If you have had this mildly elevated in the past, it is likely that we will just track it to ensure that it does not worsen quickly. Some medications  may gently worsen this, namely blood pressure pills called ace inhibitors. To some degree, we will permit levels of up to 1.6.    Sodium: This is essentially the concentration of salt within the body. This may be mildly low because of dehydration, overhydration, diuretics, .    Potassium: This is an electrolyte that can cause muscular cramping or cardiac difficulties. It is sometimes lowered by diuretic medications.    Chloride: For the most part, this is only relevant if the other electrolytes are abnormal.    CO2: This is a function of acid balance within the body. For the most part, mild abnormalities are not important and may represent a starvation or dehydration state when blood was drawn. Many medications can change this level as well such as diuretics, acetazolamide, calcium carbonate, laxatives, aspirin, and many others.    High CO2: Many things can cause this which includes dehydration, sleep apnea, and COPD.     Low CO2: Many things can lead to a low level and if you are generally healthy we are usually  not concerned. Diarrhea, renal disease, diabetes, and many medications can cause this.     Anion Gap: Only relevant if your CO2 is abnormal.    Calcium: This is not related to dietary intake of calcium. It may fluctuate gently based on the amount of protein within your body. If it is above 10.9, we may need to do additional testing. Many times if low the albumin level is low and once the corrected calcium level is calculated the calcium is in a normal range.     Total protein: This looks at protein within the body. Markedly elevated levels can represent an immune response and may require further workup.    Albumin: This may be elevated because of particularly high level of fitness. If it is markedly suppressed, it may represent organ dysfunction, particularly in the liver or kidneys.    AST and ALT: These are enzymes in the liver and if elevated can indicate liver damage.     Elevated AST/ALT: Many things  can caused elevated liver enzymes and the most common reason is viral infections, alcohol use, medications, supplements, autoimmune, along with some other rare causes. One of the most common reasons for a mild elevation in liver enzymes (less than 3 times the upper limit) is fatty liver disease. Many individuals who have extra fat in their diet store this in the liver and this can build up and cause a mild elevation. This is usually diagnosed with a liver ultrasound and exclusion of other causes. The only treatment for this is diet and exercise along with avoidance of liver toxic medications and alcohol. It is standard of care to rule our viral hepatitis and get imaging of the liver if elevated. This is monitored and if I feel concerned will refer to hepatology.     Alk Phos: Part of liver function or bones    High Alk Phos: elevated levels may indicate a liver injury or obstruction of bile flow. Elevated levels can also be seen in vitamin D deficiency, drug induced, or bone disorders.     Low Alk Phos: In most cases having a low Alkaline Phosphatase enzyme activity is not due to any disease and is simply a normal variant.  Having an elevated Alk Phos is more concerning and associated with many diseases and thus I would not be overly concerned with a low level which is seen in many health individuals. The reasons for a low serum alkaline phosphatase activity were reviewed in a 1-yr retrospective study and in this study it was found that no cause was found in most cases.  Low activity values were recorded in several individuals in the absence of any obvious cause. This would suggest that the definition of the lower limit of the reference range for alkaline phosphatase is arbitrary, thus limiting the use of low serum activity as a marker of disease.  In some cases micronutrients like Zinc (Zn) and Magnesium (Mg) are causes of low ALP activity and you can take zinc or magnesium supplements. Unfortunately, the blood  work to measure zinc and magnesium levels are unreliable and not very accurate since it does not test for the intracellular zinc or magnesium levels.     Ruby FRANCIS, Bhanu BUSTILLOS, Ana Cristina MONK. Clinical significance of a low serum alkaline phosphatase. Net J Med. 1992 Feb;40(1-2):9-14. PMID: 6000607.  Jake RODRÍGUEZ S, Phillip B, Whit I, Behera S, Jake S. Low Alkaline Phosphatase (ALP) In Adult Population an Indicator of Zinc (Zn) and Magnesium (Mg) Deficiency. Curr Res Nutr Food Sci 2017;5(3). doi : http://dx.[email or web address]    Total Bilirubin: This is also part of liver function.    High T Bili: If you have right upper quadrant pain an elevation in total bilirubin can be caused by a gallbladder stone that is blocking the biliary tract that leads to your gastrointestinal tract. If you have fever and right upper quadrant pain this can sometimes be elevated when your gallbladder is infected and most individuals have nausea with vomiting associated with it. If you have no symptoms and are otherwise healthy this can be caused by Gilbert syndrome which is a benign normal variant. There are other causes such as some anemias but there would be abnormal blood counts.     Low T Bili: Nothing to be concerned about.     Thyroid Stimulating Hormone (TSH): This reading is an indicator of your thyroid function. The thyroid regulates energy levels throughout the entire body, affecting almost every organ system. This is an inverse relationship so a high number actually presents a low thyroid. If this is abnormal, we will often check an additional lab called a Free T4 to evaluate this more carefully. Borderline elevations, those of 5-10, can be watched or worked up further. Please do not take the supplement Biotin for at least a week prior to getting your TSH checked since this can lead to false measurement levels.     Prostate Specific Antigen (PSA): (Men only.) This is a prostate cancer screening test, and is no longer a  "routine screening test. Levels are truly a function of age. Being less than 4 is typical for someone more in their 60s. If you are young, it should probably be less than 3. A higher PSA result does not necessarily mean that you have cancer, but may indicate a need for a discussion with your provider. Options include observation to look at rate of rise or prostate biopsy. Not only is the absolute value important, but how much it has changed from previous years. Please ensure that there is not a dramatic rise from previous years.    Please Note: This information is included as a reference to help you better understand your lab results and is not be used for diagnosis.    Frequently asked questions    When should I take my blood pressure medication?    The latest studies show that taking your blood pressure medication at night is the best time. A recent study showed that this prevents more heart attacks and strokes. See the answer below from [place].     "Q. I've taken blood pressure medicines every morning for many years, and they keep my pressure under control. Recently, my doctor recommended taking them at bedtime, instead. Does that make sense?    A. It actually does make sense -- based on recent research. For many years, there have been at least three theoretical reasons for taking blood pressure medicines before bedtime. First, a body system that strongly affects blood pressure, called the renin-angiotensin system, has its peak activity during sleep. Second, circadian rhythms cause differences in the body chemistry at night compared with daytime. Third, most heart attacks occur in the morning, before medicines taken in the morning have a chance to "kick in."" [6].     When should I take my cholesterol medication?    It used to be recommended to take your cholesterol medication at night since the original statins that lowered cholesterol did not last 24 hours and most cholesterol synthesis is done at night. The " "long acting statins such as atorvastatin and rosuvastatin last 24 hours so they can be taken any time during the day. Simvastatin, pravastatin, fluvastatin, and lovastatin are shorter acting and should be taken at bed time.     Can supplements affect my blood work?    Yes they can. A very important supplement to not take for at least a week prior to your blood work is biotin. "Biotin supplement use is common and can lead to the false measurement of thyroid hormone in commonly used assays." [8.]    What are conditions that should not be addressed during a virtual visit?    There are some conditions that should not be evaluated via a virtual visit since optimal care is impossible. Chest pain, shortness of breath, lung conditions, abdominal pain, and any neurological complaint such as headache, dizziness, numbness ect.         When will I get commentary of my blood work?    I review all blood work that you get and I will send out commentary on this via GREE within 72 hours. In most cases you will get a message from me sooner, but many times not all of the blood work is completed thus I usually wait until all results have returned. If there is a critical abnormality you should be contacted the same day you got blood work.     How frequently do I need to have visits to get controlled substances?    It is standard protocol to have a visit every 3 months if you are taking scheduled substances such as ADHD medications, psychiatric medications, and pain medications. This is to ensure your safety and monitor for any side effects.     When should I bring prior to a visit if I want to lose weight?    I recommend that you make a food diary for a week and fill out what you ate each day. You can bring this form in to your visit and I can look over it to suggest changes that you can make.     Which over the counter medications should I avoid if I have decreased kidney function?    NSAIDs which includes ibuprofen (Advil, Motrin, " Nuprin), naproxen (Aleve), meloxicam (Mobic) and diclofenac(Zorvolex, Voltaren) and ketorolac (Toradol) can damage your kidneys if you take this long term.Tylenol does not affect your kidney and thus is safe as long as you don't have liver disease.     Is there an [facility] pharmacy?     Yes! The [facility] is located on the first floor of the [facility]. The address is listed below. You can get curbside pickup if you call their number at [phone]. One of the many benefits of using the [facility] pharmacy is that the pharmacists can contact me directly if a cheaper alternative is available to save you money. They also see your note to know more about what the medication was prescribed for. I recommend this pharmacy since communication with me is quick in case any confusion arises on your medications.     [address] [facility].  [address] [place] [id number]  Phone: [phone]    Hours:  Monday - Friday  8:00 a.m. - 5:30 a.m.    Why is it not in my best interest to call in order to get an antibiotic?    Medicine is a complex field and many times the correct diagnosis is critical in order to provide the correct care. One of the most important goals of a healthcare provider is to ensure that no dangerous condition is masquerading as a mild illness. Specific questions are very important to obtain during an examination that provide a wealth of information to understand your illness. Health care providers are trained to investigate for signs that can be dangerous to your health. Messaging or calling the office in order to get an antibiotic can be very dangerous.     For example, many urinary tract infections can lead to an infection in the kidney that can result in a serious blood stream infection that can lead to hospitalization if not recognized. A cough can be caused by many different things and not necessarily an infection. It is not uncommon that one assumes a cough is from an infection when it is actually caused  by a blood clot in the lungs. This can lead to death. Determining your risk can only be performed after a thorough history and examination. A few sentences through e-mail is not enough.     What are some common symptoms that should be evaluated by the emergency department and not by phone or e-mail?    This does not include every symptom, but common examples of symptoms that should prompt one to go to the emergency department are chest pain, chest pressure, shortness of breath, difficulty breathing, abdominal pain, weakness or numbness or an extremity, sudden weakness or drooping on one side of the body, speaking difficulty, unusual or bad headache (particularly if it started suddenly), head injury, confusion, seizure, passing out, lightheaded, pain in arm or jaw, suddenly not able to speak, see, walk, or move, dizziness, neck stiffness, suicidal thoughts, testicular pain, cuts and wounds, severe pain, along with many others. This is not an inclusive list.     Outside Records    It is common to have an colonoscopy, mammogram, PAP smear, or eye exam done outside the [facility] system. Many times we do not get the records automatically sent to us. Please call your provider's office to notify them to fax us your records so that we can have the most up to date information. Your provider will review your outside results in order to provide you with the complete care that you deserve. We appreciate that you decided to choose us to be serving on your healthcare team and the more information we have about your health is essential.     If I have a psychiatric crisis what should I do?    If you ever feel that there is a risk of a harm to yourself we recommend to go to the emergency room. There is a National Suicide Prevention lifeline number 0-762-446-TALK which route you to the nearest crisis center. There is also a suicide hotline 5-073-DRXGGYP (1-736.650.2730).    988 has been designated as the new three-digit dialing code  "that will route callers to the National Suicide Prevention Lifeline (now known as the 988 Suicide & Crisis Lifeline), and is now active across the [place].    When people call, text, or chat 988, they will be connected to trained counselors that are part of the existing Lifeline network. These trained counselors will listen, understand how their problems are affecting them, provide support, and connect them to resources if necessary.    The previous Lifeline phone number (1-892.452.8821) will always remain available to people in emotional distress or suicidal crisis.    The [employer] network of over 200 crisis centers has been in operation since 2005, and has been proven to be effective. Its the counselors at these local crisis centers who answer the contacts the Lifeline receives every day. Numerous studies have shown that callers feel less suicidal, less depressed, less overwhelmed and more hopeful after speaking with a Lifeline counselor.     Patient Education       Checking Your Blood Pressure at Home   The Basics   Written by the doctors and editors at UpToDate   How is blood pressure measured? -- Blood pressure is usually measured with a device that goes around your upper arm. This is often done in a doctor's office. But some people also check their blood pressure themselves, at home or at work.  Blood pressure is explained with 2 numbers. For instance, your blood pressure might be "140 over 90." The first (top) number is the pressure inside your arteries when your heart is chica. The second (bottom) number is the pressure inside your arteries when your heart is relaxed. The table shows how doctors and nurses define high and normal blood pressure (table 1).  If your blood pressure gets too high, it puts you at risk for heart attack, stroke, and kidney disease. High blood pressure does not usually cause symptoms. But it can be serious.  What is a home blood pressure meter? -- A home blood pressure " "meter (or "monitor") is a device you can use to check your blood pressure yourself. It has a cuff that goes around your upper arm (figure 1). Some devices have a cuff that goes around your wrist instead. But doctors aren't sure if these work as well. The meter also has a small screen, or dial, that shows your blood pressure numbers.  There are also special meters you can wear for a day or 2. These are different because they automatically check your blood pressure throughout the day and night, even while you are sleeping. If your doctor thinks you should use one of these devices, they will talk to you about how to wear it.  Why do I need to check my blood pressure at home? -- If your doctor knows or suspects that you have high blood pressure, they might want you to check it at home. There are a few reasons for this. Your doctor might want to look at:  Whether your blood pressure measures the same at home as it did in the doctor's office  How well your blood pressure medicines are working  Changes in your blood pressure, for example, if it goes up and down  People who check their own blood pressure at home usually do better at keeping it low.  How do I choose a home blood pressure meter? -- When choosing a home blood pressure meter, you will probably want to think about:  Cost - Some devices cost more than others. You should also check to see if your insurance will help pay for your device.  Size - It's important to make sure the cuff fits your arm comfortably. Your doctor or nurse can help you with this.  How easy it is to use - You should make sure you understand how to use the device. You also need to be able to read the numbers on the screen.  You do not need a prescription to buy a home blood pressure meter. You can buy them at most pharmacies or over the internet. Your doctor or nurse can help you choose the right device for you.  How do I check my blood pressure at home? -- Once you have a home blood pressure " meter, your doctor or nurse should check it to make sure it fits you and works correctly.  When it's time to check your blood pressure:  Go to the bathroom and empty your bladder first. Having a full bladder can temporarily increase your blood pressure, making the results inaccurate.  Sit in a chair with your feet flat on the ground.  Try to breathe normally and stay calm.  Attach the cuff to your arm. Place the cuff directly on your skin, not over your clothing. The cuff should be tight enough to not slip down, but not uncomfortably tight.  Sit and relax for about 3 to 5 minutes with the cuff on.  Follow the directions that came with your device to start measuring your blood pressure. This might involve squeezing the bulb at the end of the tube to inflate the cuff (fill it with air). With some monitors, you just need to press a button to inflate the cuff. When the cuff fills with air, it feels like someone is squeezing your arm, but it should not hurt. Then you will slowly deflate the cuff (let the air out of it), or it will deflate by itself. The screen or dial will show your blood pressure numbers.  Stay seated and relax for 1 minute, then measure your blood pressure again.  How often should I check my blood pressure? -- It depends. Different people need to follow different schedules. Your doctor or nurse will tell you how often to check your blood pressure, and when. Some people need to check their blood pressure twice a day, in the morning and evening.  Your doctor or nurse will probably tell you to keep track of your blood pressure for at least a few days (table 2). Then they will look at the numbers. The reason for this is that it's normal for your blood pressure to change a bit from day to day. For example, the numbers might change depending on whether you recently had caffeine, just exercised, or feel stressed. Checking your blood pressure over several days - or longer - will give your doctor or nurse a  "better idea of what is average for you.  How should I keep track of my blood pressure? -- Some blood pressure meters will record your numbers for you, or send them to your computer or smartphone. If yours does not do this, you will need to write them down. Your doctor or nurse can help you figure out the best way to keep track of the numbers.  What if my blood pressure is high? -- Your doctor or nurse will tell you what to do if your blood pressure is high when you check it at home. If you get a number that is higher than normal, measure it again to see if it is still high. If it is very high (above a certain number, which your doctor or nurse will tell you to watch out for), you should call your doctor right away.  If your blood pressure is only a little high, your doctor or nurse might tell you to keep checking it for a few more days or weeks, and then call if it does not go back down. Then they can help you decide what to do next.  All topics are updated as new evidence becomes available and our peer review process is complete.  This topic retrieved from [employer] on: Sep 21, 2021.  Topic 906782 Version 4.0  Release: 29.4.2 - C29.263  © 2021 UpToDate, Inc. and/or its affiliates. All rights reserved.  table 1: Definition of normal and high blood pressure  Level  Top number  Bottom number    High 130 or above 80 or above   Elevated 120 to 129 79 or below   Normal 119 or below 79 or below   These definitions are from the American College of Cardiology/American Heart Association. Other expert groups might use slightly different definitions.  "Elevated blood pressure" is a term doctor or nurses use as a warning. It means you do not yet have high blood pressure, but your blood pressure is not as low as it should be for good health.  Graphic 06989 Version 6.0  figure 1: Using a home blood pressure meter     This is an example of a person using a home blood pressure meter.  Graphic 245654 Version 1.0    Consumer " Information Use and Disclaimer   This information is not specific medical advice and does not replace information you receive from your health care provider. This is only a brief summary of general information. It does NOT include all information about conditions, illnesses, injuries, tests, procedures, treatments, therapies, discharge instructions or life-style choices that may apply to you. You must talk with your health care provider for complete information about your health and treatment options. This information should not be used to decide whether or not to accept your health care provider's advice, instructions or recommendations. Only your health care provider has the knowledge and training to provide advice that is right for you. The use of this information is governed by the [employer] End User License Agreement, available at https://www.[employer]/en/solutions/Caring.com/about/alona.The use of [facility] content is governed by the [facility] Terms of Use. ©2021 UpToDate, Inc. All rights reserved.  Copyright   © 2021 UpToDate, Inc. and/or its affiliates. All rights reserved.      Daily Blood Pressure Log    Please print this form to assist you in keeping track of your blood pressure at home.      Name:  Date of Birth:       Average Blood Pressure:           Date: Time  (a.m.) Blood  Pressure: Pulse  Rate: Time  (p.m.) Blood  Pressure : Pulse  Rate: Comments:   Sample 8:37 127/83 84    Stressful morning                                                                                                                                                                                                     Wishing you good health,     Charlie Gu MD      References:  1-https://www.[email or web address]/speakers/ghanshyam_rosa iselas  2-https://www.[employer].com.au/news/pjwei-ojo-61-cancers-that-can-kp-ttaekf-fb-smoking/  3-https://www.cdc.gov/cancer/lung/basic_info/screening.htm  4-https://newsroom.heart.org/news/common-hypertension-medications-may-reduce-colorectal-cancer-risk  5-Dragano A, Carlton M, Joseada N, et al. High hemoglobin A1c levels within the non-diabetic range are associated with the risk of all cancers. Int J Cancer. 2016;138(7):7314-8550. doi:10.1002/ijc.77193  6-https://www.health.[employer].edu/newsletter_article/the-10-commandments-of-cancer-prevention  7-https://www.health.[place].edu/diseases-and-conditions/should-i-take-blood-pressure-medications-at-night  8-Suki BORRERO et al 2018 Prevalence of biotin supplement usage in outpatients and plasma biotin concentrations in patients presenting to the emergency department. Clin Biochem. Epub 2018 Jul 20. PMID: [id number].

## 2022-11-25 ENCOUNTER — HOSPITAL ENCOUNTER (OUTPATIENT)
Dept: RADIOLOGY | Facility: CLINIC | Age: 57
Discharge: HOME | End: 2022-11-25
Attending: STUDENT IN AN ORGANIZED HEALTH CARE EDUCATION/TRAINING PROGRAM
Payer: COMMERCIAL

## 2022-11-25 DIAGNOSIS — Z12.31 ENCOUNTER FOR SCREENING MAMMOGRAM FOR MALIGNANT NEOPLASM OF BREAST: ICD-10-CM

## 2022-11-25 PROCEDURE — 77067 MAMMO DIGITAL SCREENING BILAT WITH TOMO: ICD-10-PCS | Mod: 26,,, | Performed by: RADIOLOGY

## 2022-11-25 PROCEDURE — 77067 SCR MAMMO BI INCL CAD: CPT | Mod: TC,PO

## 2022-11-25 PROCEDURE — 77063 BREAST TOMOSYNTHESIS BI: CPT | Mod: TC,PO

## 2022-11-25 PROCEDURE — 77063 BREAST TOMOSYNTHESIS BI: CPT | Mod: 26,,, | Performed by: RADIOLOGY

## 2022-11-25 PROCEDURE — 77067 SCR MAMMO BI INCL CAD: CPT | Mod: 26,,, | Performed by: RADIOLOGY

## 2022-11-25 PROCEDURE — 77063 MAMMO DIGITAL SCREENING BILAT WITH TOMO: ICD-10-PCS | Mod: 26,,, | Performed by: RADIOLOGY

## 2022-12-31 NOTE — TELEPHONE ENCOUNTER
Care Due:                  Date            Visit Type   Department     Provider  --------------------------------------------------------------------------------                                EP -                              PRIMARY      [facility]  Last Visit: 09-      CARE ([facility])   TERRIE Gu                              EP -                              PRIMARY      [facility]  Next Visit: 09-      CARE ([facility])   MEDICINE       Charlie Gu                                                            Last  Test          Frequency    Reason                     Performed    Due Date  --------------------------------------------------------------------------------    CMP.........  12 months..  simvastatin..............  03-   03-    [facility] Embedded Care Gaps. Reference number: [id number]. 12/31/2022   12:39:31 AM CST

## 2023-01-03 RX ORDER — CYCLOBENZAPRINE HCL 10 MG
TABLET ORAL
Qty: 90 TABLET | Refills: 3 | Status: SHIPPED | OUTPATIENT
Start: 2023-01-03 | End: 2023-12-26

## 2023-03-15 DIAGNOSIS — I10 HTN (HYPERTENSION): ICD-10-CM

## 2023-04-21 ENCOUNTER — LAB VISIT (OUTPATIENT)
Dept: LAB | Facility: HOSPITAL | Age: 58
End: 2023-04-21
Attending: STUDENT IN AN ORGANIZED HEALTH CARE EDUCATION/TRAINING PROGRAM
Payer: COMMERCIAL

## 2023-04-21 ENCOUNTER — OFFICE VISIT (OUTPATIENT)
Dept: FAMILY MEDICINE | Facility: CLINIC | Age: 58
End: 2023-04-21
Payer: COMMERCIAL

## 2023-04-21 VITALS
RESPIRATION RATE: 16 BRPM | BODY MASS INDEX: 31.63 KG/M2 | HEART RATE: 101 BPM | WEIGHT: 167.56 LBS | TEMPERATURE: 99 F | DIASTOLIC BLOOD PRESSURE: 70 MMHG | HEIGHT: 61 IN | OXYGEN SATURATION: 99 % | SYSTOLIC BLOOD PRESSURE: 108 MMHG

## 2023-04-21 DIAGNOSIS — G47.09 OTHER INSOMNIA: ICD-10-CM

## 2023-04-21 DIAGNOSIS — E78.2 HYPERLIPIDEMIA, MIXED: ICD-10-CM

## 2023-04-21 DIAGNOSIS — Z00.00 ENCOUNTER FOR PREVENTIVE HEALTH EXAMINATION: Primary | ICD-10-CM

## 2023-04-21 DIAGNOSIS — I10 PRIMARY HYPERTENSION: ICD-10-CM

## 2023-04-21 DIAGNOSIS — L91.8 SKIN TAG: ICD-10-CM

## 2023-04-21 DIAGNOSIS — Z00.00 ENCOUNTER FOR PREVENTIVE HEALTH EXAMINATION: ICD-10-CM

## 2023-04-21 DIAGNOSIS — R60.9 SWELLING: ICD-10-CM

## 2023-04-21 LAB
ALBUMIN SERPL BCP-MCNC: 3.9 G/DL (ref 3.5–5.2)
ALP SERPL-CCNC: 84 U/L (ref 55–135)
ALT SERPL W/O P-5'-P-CCNC: 29 U/L (ref 10–44)
ANION GAP SERPL CALC-SCNC: 11 MMOL/L (ref 8–16)
AST SERPL-CCNC: 24 U/L (ref 10–40)
BASOPHILS # BLD AUTO: 0.07 K/UL (ref 0–0.2)
BASOPHILS NFR BLD: 0.9 % (ref 0–1.9)
BILIRUB SERPL-MCNC: 0.4 MG/DL (ref 0.1–1)
BNP SERPL-MCNC: <10 PG/ML (ref 0–99)
BUN SERPL-MCNC: 12 MG/DL (ref 6–20)
CALCIUM SERPL-MCNC: 9.4 MG/DL (ref 8.7–10.5)
CHLORIDE SERPL-SCNC: 108 MMOL/L (ref 95–110)
CHOLEST SERPL-MCNC: 151 MG/DL (ref 120–199)
CHOLEST/HDLC SERPL: 3.7 {RATIO} (ref 2–5)
CO2 SERPL-SCNC: 23 MMOL/L (ref 23–29)
CREAT SERPL-MCNC: 0.8 MG/DL (ref 0.5–1.4)
DIFFERENTIAL METHOD: ABNORMAL
EOSINOPHIL # BLD AUTO: 0.3 K/UL (ref 0–0.5)
EOSINOPHIL NFR BLD: 4.4 % (ref 0–8)
ERYTHROCYTE [DISTWIDTH] IN BLOOD BY AUTOMATED COUNT: 13 % (ref 11.5–14.5)
EST. GFR  (NO RACE VARIABLE): >60 ML/MIN/1.73 M^2
ESTIMATED AVG GLUCOSE: 126 MG/DL (ref 68–131)
GLUCOSE SERPL-MCNC: 95 MG/DL (ref 70–110)
HBA1C MFR BLD: 6 % (ref 4–5.6)
HCT VFR BLD AUTO: 37.3 % (ref 37–48.5)
HDLC SERPL-MCNC: 41 MG/DL (ref 40–75)
HDLC SERPL: 27.2 % (ref 20–50)
HGB BLD-MCNC: 11.6 G/DL (ref 12–16)
IMM GRANULOCYTES # BLD AUTO: 0.03 K/UL (ref 0–0.04)
IMM GRANULOCYTES NFR BLD AUTO: 0.4 % (ref 0–0.5)
LDLC SERPL CALC-MCNC: 86.4 MG/DL (ref 63–159)
LYMPHOCYTES # BLD AUTO: 2.5 K/UL (ref 1–4.8)
LYMPHOCYTES NFR BLD: 31.7 % (ref 18–48)
MAGNESIUM SERPL-MCNC: 2 MG/DL (ref 1.6–2.6)
MCH RBC QN AUTO: 28.6 PG (ref 27–31)
MCHC RBC AUTO-ENTMCNC: 31.1 G/DL (ref 32–36)
MCV RBC AUTO: 92 FL (ref 82–98)
MONOCYTES # BLD AUTO: 0.8 K/UL (ref 0.3–1)
MONOCYTES NFR BLD: 10.3 % (ref 4–15)
NEUTROPHILS # BLD AUTO: 4.1 K/UL (ref 1.8–7.7)
NEUTROPHILS NFR BLD: 52.3 % (ref 38–73)
NONHDLC SERPL-MCNC: 110 MG/DL
NRBC BLD-RTO: 0 /100 WBC
PLATELET # BLD AUTO: 344 K/UL (ref 150–450)
PMV BLD AUTO: 10.1 FL (ref 9.2–12.9)
POTASSIUM SERPL-SCNC: 4 MMOL/L (ref 3.5–5.1)
PROT SERPL-MCNC: 6.9 G/DL (ref 6–8.4)
RBC # BLD AUTO: 4.06 M/UL (ref 4–5.4)
SODIUM SERPL-SCNC: 142 MMOL/L (ref 136–145)
TRIGL SERPL-MCNC: 118 MG/DL (ref 30–150)
TSH SERPL DL<=0.005 MIU/L-ACNC: 1.51 UIU/ML (ref 0.4–4)
WBC # BLD AUTO: 7.76 K/UL (ref 3.9–12.7)

## 2023-04-21 PROCEDURE — 84443 ASSAY THYROID STIM HORMONE: CPT | Performed by: STUDENT IN AN ORGANIZED HEALTH CARE EDUCATION/TRAINING PROGRAM

## 2023-04-21 PROCEDURE — 85379 FIBRIN DEGRADATION QUANT: CPT | Performed by: STUDENT IN AN ORGANIZED HEALTH CARE EDUCATION/TRAINING PROGRAM

## 2023-04-21 PROCEDURE — 36415 COLL VENOUS BLD VENIPUNCTURE: CPT | Mod: PO | Performed by: STUDENT IN AN ORGANIZED HEALTH CARE EDUCATION/TRAINING PROGRAM

## 2023-04-21 PROCEDURE — 3078F DIAST BP <80 MM HG: CPT | Mod: CPTII,S$GLB,, | Performed by: STUDENT IN AN ORGANIZED HEALTH CARE EDUCATION/TRAINING PROGRAM

## 2023-04-21 PROCEDURE — 3078F PR MOST RECENT DIASTOLIC BLOOD PRESSURE < 80 MM HG: ICD-10-PCS | Mod: CPTII,S$GLB,, | Performed by: STUDENT IN AN ORGANIZED HEALTH CARE EDUCATION/TRAINING PROGRAM

## 2023-04-21 PROCEDURE — 3008F BODY MASS INDEX DOCD: CPT | Mod: CPTII,S$GLB,, | Performed by: STUDENT IN AN ORGANIZED HEALTH CARE EDUCATION/TRAINING PROGRAM

## 2023-04-21 PROCEDURE — 99999 PR PBB SHADOW E&M-EST. PATIENT-LVL IV: ICD-10-PCS | Mod: PBBFAC,,, | Performed by: STUDENT IN AN ORGANIZED HEALTH CARE EDUCATION/TRAINING PROGRAM

## 2023-04-21 PROCEDURE — 3074F SYST BP LT 130 MM HG: CPT | Mod: CPTII,S$GLB,, | Performed by: STUDENT IN AN ORGANIZED HEALTH CARE EDUCATION/TRAINING PROGRAM

## 2023-04-21 PROCEDURE — 83880 ASSAY OF NATRIURETIC PEPTIDE: CPT | Performed by: STUDENT IN AN ORGANIZED HEALTH CARE EDUCATION/TRAINING PROGRAM

## 2023-04-21 PROCEDURE — 80053 COMPREHEN METABOLIC PANEL: CPT | Performed by: STUDENT IN AN ORGANIZED HEALTH CARE EDUCATION/TRAINING PROGRAM

## 2023-04-21 PROCEDURE — 3008F PR BODY MASS INDEX (BMI) DOCUMENTED: ICD-10-PCS | Mod: CPTII,S$GLB,, | Performed by: STUDENT IN AN ORGANIZED HEALTH CARE EDUCATION/TRAINING PROGRAM

## 2023-04-21 PROCEDURE — 83735 ASSAY OF MAGNESIUM: CPT | Performed by: STUDENT IN AN ORGANIZED HEALTH CARE EDUCATION/TRAINING PROGRAM

## 2023-04-21 PROCEDURE — 1159F MED LIST DOCD IN RCRD: CPT | Mod: CPTII,S$GLB,, | Performed by: STUDENT IN AN ORGANIZED HEALTH CARE EDUCATION/TRAINING PROGRAM

## 2023-04-21 PROCEDURE — 4010F PR ACE/ARB THEARPY RXD/TAKEN: ICD-10-PCS | Mod: CPTII,S$GLB,, | Performed by: STUDENT IN AN ORGANIZED HEALTH CARE EDUCATION/TRAINING PROGRAM

## 2023-04-21 PROCEDURE — 85025 COMPLETE CBC W/AUTO DIFF WBC: CPT | Performed by: STUDENT IN AN ORGANIZED HEALTH CARE EDUCATION/TRAINING PROGRAM

## 2023-04-21 PROCEDURE — 1159F PR MEDICATION LIST DOCUMENTED IN MEDICAL RECORD: ICD-10-PCS | Mod: CPTII,S$GLB,, | Performed by: STUDENT IN AN ORGANIZED HEALTH CARE EDUCATION/TRAINING PROGRAM

## 2023-04-21 PROCEDURE — 80061 LIPID PANEL: CPT | Performed by: STUDENT IN AN ORGANIZED HEALTH CARE EDUCATION/TRAINING PROGRAM

## 2023-04-21 PROCEDURE — 4010F ACE/ARB THERAPY RXD/TAKEN: CPT | Mod: CPTII,S$GLB,, | Performed by: STUDENT IN AN ORGANIZED HEALTH CARE EDUCATION/TRAINING PROGRAM

## 2023-04-21 PROCEDURE — 3074F PR MOST RECENT SYSTOLIC BLOOD PRESSURE < 130 MM HG: ICD-10-PCS | Mod: CPTII,S$GLB,, | Performed by: STUDENT IN AN ORGANIZED HEALTH CARE EDUCATION/TRAINING PROGRAM

## 2023-04-21 PROCEDURE — 99999 PR PBB SHADOW E&M-EST. PATIENT-LVL IV: CPT | Mod: PBBFAC,,, | Performed by: STUDENT IN AN ORGANIZED HEALTH CARE EDUCATION/TRAINING PROGRAM

## 2023-04-21 PROCEDURE — 99396 PR PREVENTIVE VISIT,EST,40-64: ICD-10-PCS | Mod: S$GLB,,, | Performed by: STUDENT IN AN ORGANIZED HEALTH CARE EDUCATION/TRAINING PROGRAM

## 2023-04-21 PROCEDURE — 99396 PREV VISIT EST AGE 40-64: CPT | Mod: S$GLB,,, | Performed by: STUDENT IN AN ORGANIZED HEALTH CARE EDUCATION/TRAINING PROGRAM

## 2023-04-21 PROCEDURE — 83036 HEMOGLOBIN GLYCOSYLATED A1C: CPT | Performed by: STUDENT IN AN ORGANIZED HEALTH CARE EDUCATION/TRAINING PROGRAM

## 2023-04-21 NOTE — PROGRESS NOTES
[facility] Note    Subjective:    The HPI and pertinent ROS is included in the Diagnostic Impression Remarks section at the end of the note. Please see below for further details. Chief complaint is at end of note.     Karlene is a pleasant intelligent patient who is here for evaluation.     Modified Medications    No medications on file       Data reviewed 274}  Previous medical records reviewed and summarized in plan section at end of note.      If you are due for any health screening(s) below please notify me so we can arrange them to be ordered and scheduled. Most healthy patients at your age complete them, but you are free to accept or refuse. If you can't do it, I'll definitely understand. If you can, I'd certainly appreciate it!     All of your core healthy metrics are met.      The following portions of the patient's history were reviewed and updated as appropriate: allergies, current medications, past family history, past medical history, past social history, past surgical history and problem list.    She  has a past medical history of Back spasm (6/7/2018), High cholesterol, Hormone replacement therapy (HRT) (6/7/2018), HTN (hypertension) (6/7/2018), Hyperlipidemia, mixed (6/7/2018), Hypertension, Insomnia (6/7/2018), and Rosacea (6/7/2018).  She  has a past surgical history that includes Plantar fascia surgery (Left); Gallbladder surgery; Appendectomy; right knee arthroscopy; Shoulder surgery (Right); Breast surgery (Left); Total abdominal hysterectomy w/ bilateral salpingoophorectomy; nephrolithiasis; Colonoscopy (N/A, 4/12/2019); Hysterectomy; Arthroscopy of knee (Right, 12/27/2019); Chondroplasty of knee (Right, 12/27/2019); Breast biopsy (Left, 15 yrs ago); and Colonoscopy (N/A, 5/9/2022).    She  reports that she has never smoked. She has never been exposed to tobacco smoke. She has never used smokeless tobacco. She reports current alcohol use. She reports that she does not use  "drugs.  She family history includes Bone cancer in her maternal aunt; Breast cancer in her maternal aunt; Diabetes type II in her father; Hypertension in her father and mother; Polycystic kidney disease in her brother; Stroke (age of onset: 51) in her father.    Review of patient's allergies indicates:   Allergen Reactions    Hydrocodone Hives    Ciprofloxacin Hives    Tetanus vaccines and toxoid Hives and Swelling     Hives and arm was very swollen--as an adult       Tobacco Use: Low Risk     Smoking Tobacco Use: Never    Smokeless Tobacco Use: Never    Passive Exposure: Never     Physical Examination  General appearance: alert, cooperative, no distress  Neck: no thyromegaly, no neck stiffness  Lungs: clear to auscultation, no wheezes, rales or rhonchi, symmetric air entry  Heart: normal rate, regular rhythm, normal S1, S2, no murmurs, rubs, clicks or gallops  Abdomen: soft, nontender, nondistended, no rigidity, rebound, or guarding.   Back: no point tenderness over spine  Extremities: peripheral pulses normal, no calf tenderness ronald leg swelling   Neurological:alert, oriented, normal speech, no new focal findings or movement disorder noted from baseline    BP Readings from Last 3 Encounters:   04/21/23 108/70   09/12/22 114/84   05/09/22 104/65     Wt Readings from Last 3 Encounters:   04/21/23 76 kg (167 lb 8.8 oz)   09/12/22 75 kg (165 lb 5.5 oz)   05/09/22 72.1 kg (159 lb)     /70 (BP Location: Right arm, Patient Position: Sitting, BP Method: Large (Manual))   Pulse 101   Temp 98.5 °F (36.9 °C) (Oral)   Resp 16   Ht 5' 1" (1.549 m)   Wt 76 kg (167 lb 8.8 oz)   SpO2 99%   BMI 31.66 kg/m²    274}  Laboratory: I have reviewed old labs below:    274}    Lab Results   Component Value Date    WBC 7.90 03/10/2022    HGB 12.4 03/10/2022    HCT 39.0 03/10/2022    MCV 92 03/10/2022     03/10/2022     03/10/2022    K 4.4 03/10/2022     03/10/2022    CALCIUM 9.6 03/10/2022    CO2 25 " "03/10/2022    GLU 86 03/10/2022    BUN 15 03/10/2022    CREATININE 0.8 03/10/2022    ANIONGAP 11 03/10/2022    PROT 7.4 03/10/2022    ALBUMIN 4.1 03/10/2022    BILITOT 0.2 03/10/2022    ALKPHOS 80 03/10/2022    ALT 24 03/10/2022    AST 20 03/10/2022    CHOL 155 09/12/2022    TRIG 218 (H) 09/12/2022    HDL 41 09/12/2022    LDLCALC 70.4 09/12/2022    TSH 1.567 06/07/2018     Lab reviewed by me: Particular labs of significance that I will monitor, workup, or treat to improve are mentioned below in diagnostic impression remarks.    Imaging/EKG: I have reviewed the pertinent results and my findings are noted in remarks.  274}    CC:   Chief Complaint   Patient presents with    Follow-up    Hypertension        274}    Assessment/Plan  Karlene Damon is a 57 y.o. female who presents to clinic with:  1. Encounter for preventive health examination    2. Primary hypertension    3. Hyperlipidemia, mixed    4. Other insomnia    5. Swelling    6. Skin tag       274}  Diagnostic Impression Remarks + HPI     Documentation entered by me for this encounter may have been done in part using speech-recognition technology. Although I have made an effort to ensure accuracy, "sound like" errors may exist and should be interpreted in context.     Preventive will get blood work and follow-up on this   Hypertension well controlled any current meds is taking amlodipine and does have a small amount of bilateral leg swelling went over that we could replace amlodipine with a diuretic she wants to hold off on this   Hyperlipidemia control uncertain will repeat blood work monitor recommend healthy diet exercise   Swelling patient has bilateral leg swelling that has been present for months sometimes is worse on the left no pain or rash long car rides no history of blood clots no calf tenderness.  Patient denies orthopnea or PND no shortness of breath.  Patient is taking amlodipine could try to replace this with a diuretic she wants to hold " off on this.  Also taking NSAIDs which is well known to cause swelling.  Will get a D-dimer she did mention her left sometimes larger than her right.  She reports her heart rate is usually less than 100 at home and was rushed today  Insomnia stable continue Ambien as needed     This is the extent of this pleasant patient's concerns at this present time. She did not feel chest pain upon exertion, dyspnea, nausea, vomiting, diaphoresis, or syncope. No pleuritic chest pain, unilateral leg swelling, calf tenderness, or calf pain. Negative for unintentional weight loss night sweats and fevers. Karlene will return to clinic in a few months for further workup and reassessment or sooner as needed. She was instructed to call the clinic or go to the emergency department or urgent care immediately if her symptoms do not improve, worsens, or if any new symptoms develop. As we discussed that symptoms could worsen over the next 24 hours she was advised that if any increased swelling, pain, or numbness arise to go immediately to the ED. Patient knows to call any time if an emergency arises. Shared decision making occurred and she verbalized understanding in agreement with this plan. I discussed imaging findings, diagnosis, possibilities, treatment options, medications, risks, and benefits. She had many questions regarding the options and long-term effects. All questions were answered. She expressed understanding after counseling regarding the diagnosis and recommendations. She was capable and demonstrated competence with understanding of these options. Shared decision making was performed resulting in her choosing the current treatment plan. Patient handout was given with instructions and recommendations. Advised the patient that if they become pregnant to alert us immediately to assess for medication changes. I also discussed the importance of close follow up to discuss labs, change or modify her medications if needed, monitor  side effects, and further evaluation of medical problems.     Risks of Ambien discussed including grogginess, balance impairment, memory lapses, hallucinations, sleep walking, driving while asleep, dependence, and psychiatric complications. After a discussion of the risks and benefits, we feel that because the patient's insomnia failed to improve with good sleep hygiene, other first line treatments, poor quality of life without this medication, and disruption in their work/life balance that the benefits exceed the risks at this point in time. After understanding of the risks and shared decision making, the patient decided to pursue taking a sedative-hypnotic to treat their insomnia. The risks of having limited amount of sleep leading to impairment of driving is substantial and thus improving sleep time will likely prevent harm due to lack of sleep.  reviewed and refill deemed appropriate.      Additional workup planned: see labs ordered below.    See below for labs and meds ordered with associated diagnosis      1. Encounter for preventive health examination  - Hemoglobin A1C; Future  - COMPREHENSIVE METABOLIC PANEL; Future  - BNP; Future  - CBC Auto Differential; Future    2. Primary hypertension  - COMPREHENSIVE METABOLIC PANEL; Future  - Magnesium; Future    3. Hyperlipidemia, mixed    4. Other insomnia    5. Swelling  - BNP; Future  - D-Dimer, Quantitative; Future    6. Skin tag  - Ambulatory referral/consult to Dermatology; Future      Charlie Gu MD   274}    If you are due for any health screening(s) below please notify me so we can arrange them to be ordered and scheduled. Most healthy patients at your age complete them, but you are free to accept or refuse.     If you can't do it, I'll definitely understand. If you can, I'd certainly appreciate it!   All of your core healthy metrics are met.

## 2023-04-24 ENCOUNTER — HOSPITAL ENCOUNTER (OUTPATIENT)
Dept: RADIOLOGY | Facility: HOSPITAL | Age: 58
Discharge: HOME | End: 2023-04-24
Attending: STUDENT IN AN ORGANIZED HEALTH CARE EDUCATION/TRAINING PROGRAM
Payer: COMMERCIAL

## 2023-04-24 ENCOUNTER — TELEPHONE (OUTPATIENT)
Dept: FAMILY MEDICINE | Facility: CLINIC | Age: 58
End: 2023-04-24
Payer: COMMERCIAL

## 2023-04-24 ENCOUNTER — PATIENT MESSAGE (OUTPATIENT)
Dept: FAMILY MEDICINE | Facility: CLINIC | Age: 58
End: 2023-04-24
Payer: COMMERCIAL

## 2023-04-24 DIAGNOSIS — R07.9 CHEST PAIN, UNSPECIFIED TYPE: ICD-10-CM

## 2023-04-24 DIAGNOSIS — R79.89 POSITIVE D DIMER: Primary | ICD-10-CM

## 2023-04-24 DIAGNOSIS — R79.89 POSITIVE D DIMER: ICD-10-CM

## 2023-04-24 DIAGNOSIS — R60.9 SWELLING: ICD-10-CM

## 2023-04-24 LAB — D DIMER PPP IA.FEU-MCNC: 0.65 MG/L FEU

## 2023-04-24 PROCEDURE — 71275 CT ANGIOGRAPHY CHEST: CPT | Mod: TC

## 2023-04-24 PROCEDURE — 93970 EXTREMITY STUDY: CPT | Mod: TC

## 2023-04-24 PROCEDURE — 71275 CTA CHEST NON CORONARY (PE STUDIES): ICD-10-PCS | Mod: 26,,, | Performed by: RADIOLOGY

## 2023-04-24 PROCEDURE — 71275 CT ANGIOGRAPHY CHEST: CPT | Mod: 26,,, | Performed by: RADIOLOGY

## 2023-04-24 PROCEDURE — 93970 EXTREMITY STUDY: CPT | Mod: 26,,, | Performed by: RADIOLOGY

## 2023-04-24 PROCEDURE — 25500020 PHARM REV CODE 255

## 2023-04-24 PROCEDURE — 93970 US LOWER EXTREMITY VEINS BILATERAL: ICD-10-PCS | Mod: 26,,, | Performed by: RADIOLOGY

## 2023-04-24 RX ADMIN — IOHEXOL 75 ML: 350 INJECTION, SOLUTION INTRAVENOUS at 05:04

## 2023-04-24 NOTE — TELEPHONE ENCOUNTER
----- Message from Delmis Sequeira sent at 4/24/2023  3:13 PM CDT -----  Contact: self  Type:  Patient Returning Call    Who Called:Pt  Who Left Message for Patient:??  Does the patient know what this is regarding?:??  Would the patient rather a call back or a response via [facility]? call  Best Call Back Number:[phone]    Additional Information: Please return call to pt... Thank you...

## 2023-05-22 DIAGNOSIS — G47.00 INSOMNIA, UNSPECIFIED TYPE: ICD-10-CM

## 2023-05-23 RX ORDER — ZOLPIDEM TARTRATE 10 MG/1
TABLET ORAL
Qty: 90 TABLET | Refills: 1 | Status: SHIPPED | OUTPATIENT
Start: 2023-05-23 | End: 2023-12-04

## 2023-05-23 NOTE — TELEPHONE ENCOUNTER
No care due was identified.  [facility] Embedded Care Due Messages. Reference number: [id number].   5/22/2023 11:34:35 PM CDT

## 2023-07-01 NOTE — TELEPHONE ENCOUNTER
No care due was identified.  [facility] Embedded Care Due Messages. Reference number: [id number].   7/01/2023 3:26:10 PM CDT

## 2023-07-02 RX ORDER — AMLODIPINE AND BENAZEPRIL HYDROCHLORIDE 10; 20 MG/1; MG/1
CAPSULE ORAL
Qty: 90 CAPSULE | Refills: 3 | Status: SHIPPED | OUTPATIENT
Start: 2023-07-02

## 2023-07-02 NOTE — TELEPHONE ENCOUNTER
Refill Decision Note   Karlene Damon  is requesting a refill authorization.  Brief Assessment and Rationale for Refill:  Approve     Medication Therapy Plan:         Comments:     Note composed:5:04 PM 07/02/2023             Appointments     Last Visit   4/21/2023 Charlie Gu MD   Next Visit   9/13/2023 Charlie Gu MD

## 2023-07-03 ENCOUNTER — OFFICE VISIT (OUTPATIENT)
Dept: DERMATOLOGY | Facility: CLINIC | Age: 58
End: 2023-07-03
Payer: COMMERCIAL

## 2023-07-03 VITALS — BODY MASS INDEX: 31.63 KG/M2 | WEIGHT: 167.56 LBS | HEIGHT: 61 IN

## 2023-07-03 DIAGNOSIS — D22.9 MULTIPLE BENIGN NEVI: ICD-10-CM

## 2023-07-03 DIAGNOSIS — L82.1 SEBORRHEIC KERATOSES: ICD-10-CM

## 2023-07-03 DIAGNOSIS — L91.8 SKIN TAG: ICD-10-CM

## 2023-07-03 DIAGNOSIS — L81.4 SOLAR LENTIGO: ICD-10-CM

## 2023-07-03 DIAGNOSIS — L73.8 SEBACEOUS HYPERPLASIA OF FACE: Primary | ICD-10-CM

## 2023-07-03 PROCEDURE — 3008F BODY MASS INDEX DOCD: CPT | Mod: CPTII,S$GLB,, | Performed by: DERMATOLOGY

## 2023-07-03 PROCEDURE — 99203 PR OFFICE/OUTPT VISIT, NEW, LEVL III, 30-44 MIN: ICD-10-PCS | Mod: S$GLB,,, | Performed by: DERMATOLOGY

## 2023-07-03 PROCEDURE — 1160F RVW MEDS BY RX/DR IN RCRD: CPT | Mod: CPTII,S$GLB,, | Performed by: DERMATOLOGY

## 2023-07-03 PROCEDURE — 1160F PR REVIEW ALL MEDS BY PRESCRIBER/CLIN PHARMACIST DOCUMENTED: ICD-10-PCS | Mod: CPTII,S$GLB,, | Performed by: DERMATOLOGY

## 2023-07-03 PROCEDURE — 3044F HG A1C LEVEL LT 7.0%: CPT | Mod: CPTII,S$GLB,, | Performed by: DERMATOLOGY

## 2023-07-03 PROCEDURE — 3044F PR MOST RECENT HEMOGLOBIN A1C LEVEL <7.0%: ICD-10-PCS | Mod: CPTII,S$GLB,, | Performed by: DERMATOLOGY

## 2023-07-03 PROCEDURE — 99203 OFFICE O/P NEW LOW 30 MIN: CPT | Mod: S$GLB,,, | Performed by: DERMATOLOGY

## 2023-07-03 PROCEDURE — 1159F MED LIST DOCD IN RCRD: CPT | Mod: CPTII,S$GLB,, | Performed by: DERMATOLOGY

## 2023-07-03 PROCEDURE — 4010F ACE/ARB THERAPY RXD/TAKEN: CPT | Mod: CPTII,S$GLB,, | Performed by: DERMATOLOGY

## 2023-07-03 PROCEDURE — 1159F PR MEDICATION LIST DOCUMENTED IN MEDICAL RECORD: ICD-10-PCS | Mod: CPTII,S$GLB,, | Performed by: DERMATOLOGY

## 2023-07-03 PROCEDURE — 4010F PR ACE/ARB THEARPY RXD/TAKEN: ICD-10-PCS | Mod: CPTII,S$GLB,, | Performed by: DERMATOLOGY

## 2023-07-03 PROCEDURE — 3008F PR BODY MASS INDEX (BMI) DOCUMENTED: ICD-10-PCS | Mod: CPTII,S$GLB,, | Performed by: DERMATOLOGY

## 2023-07-03 NOTE — PROGRESS NOTES
Subjective:      Patient ID:  Karlene Damon is a 58 y.o. female who presents for   Chief Complaint   Patient presents with    Mole    Spot     LOV 3/11/16 Rosacea, Hair thinning    Patient here for UBSC  C/O moles/ bumps to face, neck and under left breast    Derm Hx:  No Phx NMSC  No Fhx MM     Current Outpatient Medications:   ·  amlodipine-benazepril 10-20mg (LOTREL) 10-20 mg per capsule, TAKE 1 CAPSULE BY MOUTH EVERY DAY, Disp: 90 capsule, Rfl: 3  ·  cetirizine (ZYRTEC) 10 MG tablet, Take 10 mg by mouth once daily., Disp: , Rfl:   ·  cyclobenzaprine (FLEXERIL) 10 MG tablet, TAKE 1 TABLET BY MOUTH EVERY NIGHT AS NEEDED, Disp: 90 tablet, Rfl: 3  ·  fluticasone (FLONASE) 50 mcg/actuation nasal spray, 1 spray by Each Nostril route as needed. , Disp: , Rfl:   ·  magnesium 250 mg Tab, Take 250 mg by mouth once daily., Disp: , Rfl:   ·  metronidazole 1% (METROGEL) 1 % Gel, Apply topically once daily., Disp: 60 g, Rfl: 11  ·  naproxen sodium (ANAPROX) 220 MG tablet, Take 220 mg by mouth every 12 (twelve) hours., Disp: , Rfl:   ·  omega 3-dha-epa-fish oil 1,000 mg (120 mg-180 mg) Cap, Take 1 capsule by mouth once daily., Disp: , Rfl:   ·  ORACEA 40 mg capsule, TAKE 1 CAPSULE BY MOUTH EVERY DAY, Disp: 90 capsule, Rfl: 1  ·  potassium 99 mg Tab, Take 2 tablets by mouth once daily., Disp: , Rfl:   ·  simvastatin (ZOCOR) 10 MG tablet, TAKE 1 TABLET BY MOUTH EVERY DAY IN THE EVENING, Disp: 90 tablet, Rfl: 1  ·  zolpidem (AMBIEN) 10 mg Tab, TAKE 1 TABLET BY MOUTH EVERYDAY AT BEDTIME, Disp: 90 tablet, Rfl: 1     Review of Systems   Constitutional:  Negative for fever and chills.   Respiratory:  Negative for cough and shortness of breath.    Skin:  Positive for activity-related sunscreen use. Negative for itching, rash, dry skin and wears hat.     Objective:   Physical Exam   Constitutional: She appears well-developed and well-nourished. No distress.   Neurological: She is alert and oriented to person, place, and time.  She is not disoriented.   Psychiatric: She has a normal mood and affect.   Skin:   Areas Examined (abnormalities noted in diagram):   Scalp / Hair Palpated and Inspected  Head / Face Inspection Performed  Neck Inspection Performed  Chest / Axilla Inspection Performed  Abdomen Inspection Performed  Back Inspection Performed  RUE Inspected  LUE Inspection Performed  Nails and Digits Inspection Performed                   Diagram Legend     Erythematous scaling macule/papule c/w actinic keratosis       Vascular papule c/w angioma      Pigmented verrucoid papule/plaque c/w seborrheic keratosis      Yellow umbilicated papule c/w sebaceous hyperplasia      Irregularly shaped tan macule c/w lentigo     1-2 mm smooth white papules consistent with Milia      Movable subcutaneous cyst with punctum c/w epidermal inclusion cyst      Subcutaneous movable cyst c/w pilar cyst      Firm pink to brown papule c/w dermatofibroma      Pedunculated fleshy papule(s) c/w skin tag(s)      Evenly pigmented macule c/w junctional nevus     Mildly variegated pigmented, slightly irregular-bordered macule c/w mildly atypical nevus      Flesh colored to evenly pigmented papule c/w intradermal nevus       Pink pearly papule/plaque c/w basal cell carcinoma      Erythematous hyperkeratotic cursted plaque c/w SCC      Surgical scar with no sign of skin cancer recurrence      Open and closed comedones      Inflammatory papules and pustules      Verrucoid papule consistent consistent with wart     Erythematous eczematous patches and plaques     Dystrophic onycholytic nail with subungual debris c/w onychomycosis     Umbilicated papule    Erythematous-base heme-crusted tan verrucoid plaque consistent with inflamed seborrheic keratosis     Erythematous Silvery Scaling Plaque c/w Psoriasis     See annotation      Assessment / Plan:        Sebaceous hyperplasia of face  This is a common condition representing benign enlargement of the sebaceous lobule. It  typically occurs in adulthood. Reassurance given to patient.     Skin tag  -     Ambulatory referral/consult to Dermatology  Reassurance given to patient. No treatment is necessary.   Treatment of benign, asymptomatic lesions may be considered cosmetic.    Solar lentigo  This is a benign hyperpigmented sun induced lesion. Daily sun protection will reduce the number of new lesions. Treatment of these benign lesions are considered cosmetic.    Seborrheic keratoses  These are benign inherited growths without a malignant potential. Reassurance given to patient. No treatment is necessary.     Multiple benign nevi  Careful dermoscopy evaluation of nevi performed with none identified as needing biopsy today  Monitor for new mole or moles that are becoming bigger, darker, irritated, or developing irregular borders.     Patient instructed in importance in daily broad spectrum sun protection of at least spf 30. Mineral sunscreen ingredients preferred (Zinc +/- Titanium) and can be found OTC.   Recommend Elta MD for daily use on face and neck.  Patient encouraged to wear hat for all outdoor exposure.   Also discussed sun avoidance and use of protective clothing.             No follow-ups on file.

## 2023-07-28 DIAGNOSIS — L71.9 ROSACEA: ICD-10-CM

## 2023-07-28 DIAGNOSIS — E78.5 HYPERLIPIDEMIA, UNSPECIFIED HYPERLIPIDEMIA TYPE: ICD-10-CM

## 2023-07-28 NOTE — TELEPHONE ENCOUNTER
No care due was identified.  [facility] Embedded Care Due Messages. Reference number: [id number].   7/28/2023 11:07:53 AM CDT

## 2023-07-29 RX ORDER — SIMVASTATIN 10 MG/1
TABLET, FILM COATED ORAL
Qty: 90 TABLET | Refills: 2 | Status: SHIPPED | OUTPATIENT
Start: 2023-07-29

## 2023-07-29 NOTE — TELEPHONE ENCOUNTER
Refill Routing Note   Medication(s) are not appropriate for processing by [facility] for the following reason(s):      Medication outside of protocol    [facility] action(s):  Approve  Route Care Due:  None identified            Appointments  past 12m or future 3m with PCP    Date Provider   Last Visit   4/21/2023 Charlie Gu MD   Next Visit   9/13/2023 Charlie Gu MD   ED visits in past 90 days: 0        Note composed:8:43 AM 07/29/2023

## 2023-07-31 DIAGNOSIS — L71.9 ROSACEA: ICD-10-CM

## 2023-07-31 RX ORDER — DOXYCYCLINE 40 MG/1
CAPSULE ORAL
Qty: 90 CAPSULE | Refills: 1 | Status: SHIPPED | OUTPATIENT
Start: 2023-07-31 | End: 2023-07-31

## 2023-07-31 RX ORDER — DOXYCYCLINE HYCLATE 50 MG/1
50 CAPSULE ORAL DAILY
Qty: 90 CAPSULE | Refills: 3 | Status: SHIPPED | OUTPATIENT
Start: 2023-07-31

## 2023-07-31 RX ORDER — DOXYCYCLINE 40 MG/1
40 CAPSULE ORAL DAILY
Qty: 90 CAPSULE | Refills: 3 | OUTPATIENT
Start: 2023-07-31

## 2023-08-23 ENCOUNTER — PATIENT MESSAGE (OUTPATIENT)
Dept: FAMILY MEDICINE | Facility: CLINIC | Age: 58
End: 2023-08-23
Payer: COMMERCIAL

## 2023-12-03 DIAGNOSIS — G47.00 INSOMNIA, UNSPECIFIED TYPE: ICD-10-CM

## 2023-12-03 NOTE — TELEPHONE ENCOUNTER
No care due was identified.  [facility] Embedded Care Due Messages. Reference number: [id number].   12/03/2023 10:43:37 AM CST

## 2023-12-04 RX ORDER — ZOLPIDEM TARTRATE 10 MG/1
TABLET ORAL
Qty: 30 TABLET | Refills: 0 | Status: SHIPPED | OUTPATIENT
Start: 2023-12-04 | End: 2024-01-02

## 2023-12-06 DIAGNOSIS — Z12.31 OTHER SCREENING MAMMOGRAM: ICD-10-CM

## 2023-12-22 ENCOUNTER — LAB VISIT (OUTPATIENT)
Dept: LAB | Facility: HOSPITAL | Age: 58
End: 2023-12-22
Attending: STUDENT IN AN ORGANIZED HEALTH CARE EDUCATION/TRAINING PROGRAM
Payer: COMMERCIAL

## 2023-12-22 ENCOUNTER — OFFICE VISIT (OUTPATIENT)
Dept: FAMILY MEDICINE | Facility: CLINIC | Age: 58
End: 2023-12-22
Payer: COMMERCIAL

## 2023-12-22 VITALS
RESPIRATION RATE: 17 BRPM | HEART RATE: 104 BPM | HEIGHT: 61 IN | OXYGEN SATURATION: 99 % | DIASTOLIC BLOOD PRESSURE: 68 MMHG | TEMPERATURE: 98 F | WEIGHT: 163.13 LBS | SYSTOLIC BLOOD PRESSURE: 110 MMHG | BODY MASS INDEX: 30.8 KG/M2

## 2023-12-22 DIAGNOSIS — R79.9 ABNORMAL FINDING OF BLOOD CHEMISTRY, UNSPECIFIED: ICD-10-CM

## 2023-12-22 DIAGNOSIS — E78.2 HYPERLIPIDEMIA, MIXED: ICD-10-CM

## 2023-12-22 DIAGNOSIS — R60.9 SWELLING: ICD-10-CM

## 2023-12-22 DIAGNOSIS — I10 PRIMARY HYPERTENSION: ICD-10-CM

## 2023-12-22 DIAGNOSIS — R21 RASH: Primary | ICD-10-CM

## 2023-12-22 DIAGNOSIS — R73.03 PREDIABETES: ICD-10-CM

## 2023-12-22 DIAGNOSIS — G89.29 CHRONIC LEFT SHOULDER PAIN: ICD-10-CM

## 2023-12-22 DIAGNOSIS — Z12.31 ENCOUNTER FOR SCREENING MAMMOGRAM FOR MALIGNANT NEOPLASM OF BREAST: ICD-10-CM

## 2023-12-22 DIAGNOSIS — M25.512 CHRONIC LEFT SHOULDER PAIN: ICD-10-CM

## 2023-12-22 LAB
ALBUMIN SERPL BCP-MCNC: 4.1 G/DL (ref 3.5–5.2)
ALP SERPL-CCNC: 72 U/L (ref 55–135)
ALT SERPL W/O P-5'-P-CCNC: 29 U/L (ref 10–44)
ANION GAP SERPL CALC-SCNC: 9 MMOL/L (ref 8–16)
AST SERPL-CCNC: 20 U/L (ref 10–40)
BASOPHILS # BLD AUTO: 0.06 K/UL (ref 0–0.2)
BASOPHILS NFR BLD: 0.9 % (ref 0–1.9)
BILIRUB SERPL-MCNC: 0.4 MG/DL (ref 0.1–1)
BUN SERPL-MCNC: 17 MG/DL (ref 6–20)
CALCIUM SERPL-MCNC: 9.7 MG/DL (ref 8.7–10.5)
CHLORIDE SERPL-SCNC: 104 MMOL/L (ref 95–110)
CO2 SERPL-SCNC: 27 MMOL/L (ref 23–29)
CREAT SERPL-MCNC: 0.8 MG/DL (ref 0.5–1.4)
DIFFERENTIAL METHOD: ABNORMAL
EOSINOPHIL # BLD AUTO: 0.3 K/UL (ref 0–0.5)
EOSINOPHIL NFR BLD: 4.4 % (ref 0–8)
ERYTHROCYTE [DISTWIDTH] IN BLOOD BY AUTOMATED COUNT: 12.4 % (ref 11.5–14.5)
EST. GFR  (NO RACE VARIABLE): >60 ML/MIN/1.73 M^2
ESTIMATED AVG GLUCOSE: 123 MG/DL (ref 68–131)
GLUCOSE SERPL-MCNC: 101 MG/DL (ref 70–110)
HBA1C MFR BLD: 5.9 % (ref 4–5.6)
HCT VFR BLD AUTO: 36.7 % (ref 37–48.5)
HGB BLD-MCNC: 12.6 G/DL (ref 12–16)
IMM GRANULOCYTES # BLD AUTO: 0.04 K/UL (ref 0–0.04)
IMM GRANULOCYTES NFR BLD AUTO: 0.6 % (ref 0–0.5)
LYMPHOCYTES # BLD AUTO: 1.9 K/UL (ref 1–4.8)
LYMPHOCYTES NFR BLD: 28.1 % (ref 18–48)
MCH RBC QN AUTO: 29.4 PG (ref 27–31)
MCHC RBC AUTO-ENTMCNC: 34.3 G/DL (ref 32–36)
MCV RBC AUTO: 86 FL (ref 82–98)
MONOCYTES # BLD AUTO: 0.7 K/UL (ref 0.3–1)
MONOCYTES NFR BLD: 11.2 % (ref 4–15)
NEUTROPHILS # BLD AUTO: 3.6 K/UL (ref 1.8–7.7)
NEUTROPHILS NFR BLD: 54.8 % (ref 38–73)
NRBC BLD-RTO: 0 /100 WBC
PLATELET # BLD AUTO: 371 K/UL (ref 150–450)
PMV BLD AUTO: 9.9 FL (ref 9.2–12.9)
POTASSIUM SERPL-SCNC: 4.1 MMOL/L (ref 3.5–5.1)
PROT SERPL-MCNC: 7.6 G/DL (ref 6–8.4)
RBC # BLD AUTO: 4.29 M/UL (ref 4–5.4)
SODIUM SERPL-SCNC: 140 MMOL/L (ref 136–145)
WBC # BLD AUTO: 6.61 K/UL (ref 3.9–12.7)

## 2023-12-22 PROCEDURE — 20610 PR DRAIN/INJECT LARGE JOINT/BURSA: ICD-10-PCS | Mod: LT,S$GLB,, | Performed by: STUDENT IN AN ORGANIZED HEALTH CARE EDUCATION/TRAINING PROGRAM

## 2023-12-22 PROCEDURE — 3078F DIAST BP <80 MM HG: CPT | Mod: CPTII,S$GLB,, | Performed by: STUDENT IN AN ORGANIZED HEALTH CARE EDUCATION/TRAINING PROGRAM

## 2023-12-22 PROCEDURE — 3074F SYST BP LT 130 MM HG: CPT | Mod: CPTII,S$GLB,, | Performed by: STUDENT IN AN ORGANIZED HEALTH CARE EDUCATION/TRAINING PROGRAM

## 2023-12-22 PROCEDURE — 3044F PR MOST RECENT HEMOGLOBIN A1C LEVEL <7.0%: ICD-10-PCS | Mod: CPTII,S$GLB,, | Performed by: STUDENT IN AN ORGANIZED HEALTH CARE EDUCATION/TRAINING PROGRAM

## 2023-12-22 PROCEDURE — 20610 DRAIN/INJ JOINT/BURSA W/O US: CPT | Mod: LT,S$GLB,, | Performed by: STUDENT IN AN ORGANIZED HEALTH CARE EDUCATION/TRAINING PROGRAM

## 2023-12-22 PROCEDURE — 4010F ACE/ARB THERAPY RXD/TAKEN: CPT | Mod: CPTII,S$GLB,, | Performed by: STUDENT IN AN ORGANIZED HEALTH CARE EDUCATION/TRAINING PROGRAM

## 2023-12-22 PROCEDURE — 4010F PR ACE/ARB THEARPY RXD/TAKEN: ICD-10-PCS | Mod: CPTII,S$GLB,, | Performed by: STUDENT IN AN ORGANIZED HEALTH CARE EDUCATION/TRAINING PROGRAM

## 2023-12-22 PROCEDURE — 3074F PR MOST RECENT SYSTOLIC BLOOD PRESSURE < 130 MM HG: ICD-10-PCS | Mod: CPTII,S$GLB,, | Performed by: STUDENT IN AN ORGANIZED HEALTH CARE EDUCATION/TRAINING PROGRAM

## 2023-12-22 PROCEDURE — 83036 HEMOGLOBIN GLYCOSYLATED A1C: CPT | Performed by: STUDENT IN AN ORGANIZED HEALTH CARE EDUCATION/TRAINING PROGRAM

## 2023-12-22 PROCEDURE — 3008F BODY MASS INDEX DOCD: CPT | Mod: CPTII,S$GLB,, | Performed by: STUDENT IN AN ORGANIZED HEALTH CARE EDUCATION/TRAINING PROGRAM

## 2023-12-22 PROCEDURE — 99214 OFFICE O/P EST MOD 30 MIN: CPT | Mod: 25,S$GLB,, | Performed by: STUDENT IN AN ORGANIZED HEALTH CARE EDUCATION/TRAINING PROGRAM

## 2023-12-22 PROCEDURE — 3044F HG A1C LEVEL LT 7.0%: CPT | Mod: CPTII,S$GLB,, | Performed by: STUDENT IN AN ORGANIZED HEALTH CARE EDUCATION/TRAINING PROGRAM

## 2023-12-22 PROCEDURE — 1159F MED LIST DOCD IN RCRD: CPT | Mod: CPTII,S$GLB,, | Performed by: STUDENT IN AN ORGANIZED HEALTH CARE EDUCATION/TRAINING PROGRAM

## 2023-12-22 PROCEDURE — 99999 PR PBB SHADOW E&M-EST. PATIENT-LVL IV: ICD-10-PCS | Mod: PBBFAC,,, | Performed by: STUDENT IN AN ORGANIZED HEALTH CARE EDUCATION/TRAINING PROGRAM

## 2023-12-22 PROCEDURE — 99214 PR OFFICE/OUTPT VISIT, EST, LEVL IV, 30-39 MIN: ICD-10-PCS | Mod: 25,S$GLB,, | Performed by: STUDENT IN AN ORGANIZED HEALTH CARE EDUCATION/TRAINING PROGRAM

## 2023-12-22 PROCEDURE — 85025 COMPLETE CBC W/AUTO DIFF WBC: CPT | Performed by: STUDENT IN AN ORGANIZED HEALTH CARE EDUCATION/TRAINING PROGRAM

## 2023-12-22 PROCEDURE — 99999 PR PBB SHADOW E&M-EST. PATIENT-LVL IV: CPT | Mod: PBBFAC,,, | Performed by: STUDENT IN AN ORGANIZED HEALTH CARE EDUCATION/TRAINING PROGRAM

## 2023-12-22 PROCEDURE — 36415 COLL VENOUS BLD VENIPUNCTURE: CPT | Mod: PO | Performed by: STUDENT IN AN ORGANIZED HEALTH CARE EDUCATION/TRAINING PROGRAM

## 2023-12-22 PROCEDURE — 3008F PR BODY MASS INDEX (BMI) DOCUMENTED: ICD-10-PCS | Mod: CPTII,S$GLB,, | Performed by: STUDENT IN AN ORGANIZED HEALTH CARE EDUCATION/TRAINING PROGRAM

## 2023-12-22 PROCEDURE — 1159F PR MEDICATION LIST DOCUMENTED IN MEDICAL RECORD: ICD-10-PCS | Mod: CPTII,S$GLB,, | Performed by: STUDENT IN AN ORGANIZED HEALTH CARE EDUCATION/TRAINING PROGRAM

## 2023-12-22 PROCEDURE — 3078F PR MOST RECENT DIASTOLIC BLOOD PRESSURE < 80 MM HG: ICD-10-PCS | Mod: CPTII,S$GLB,, | Performed by: STUDENT IN AN ORGANIZED HEALTH CARE EDUCATION/TRAINING PROGRAM

## 2023-12-22 PROCEDURE — 80053 COMPREHEN METABOLIC PANEL: CPT | Performed by: STUDENT IN AN ORGANIZED HEALTH CARE EDUCATION/TRAINING PROGRAM

## 2023-12-22 RX ORDER — TRIAMCINOLONE ACETONIDE 1 MG/G
CREAM TOPICAL
Qty: 80 G | Refills: 1 | Status: SHIPPED | OUTPATIENT
Start: 2023-12-22

## 2023-12-22 RX ORDER — LIDOCAINE HYDROCHLORIDE 10 MG/ML
4 INJECTION, SOLUTION EPIDURAL; INFILTRATION; INTRACAUDAL; PERINEURAL
Status: COMPLETED | OUTPATIENT
Start: 2023-12-22 | End: 2023-12-22

## 2023-12-22 RX ORDER — TRIAMCINOLONE ACETONIDE 40 MG/ML
40 INJECTION, SUSPENSION INTRA-ARTICULAR; INTRAMUSCULAR
Status: COMPLETED | OUTPATIENT
Start: 2023-12-22 | End: 2023-12-22

## 2023-12-22 RX ORDER — TRIAMTERENE AND HYDROCHLOROTHIAZIDE 37.5; 25 MG/1; MG/1
1 CAPSULE ORAL DAILY PRN
Qty: 90 CAPSULE | Refills: 3 | Status: SHIPPED | OUTPATIENT
Start: 2023-12-22 | End: 2024-12-21

## 2023-12-22 RX ADMIN — LIDOCAINE HYDROCHLORIDE 40 MG: 10 INJECTION, SOLUTION EPIDURAL; INFILTRATION; INTRACAUDAL; PERINEURAL at 07:12

## 2023-12-22 RX ADMIN — TRIAMCINOLONE ACETONIDE 40 MG: 40 INJECTION, SUSPENSION INTRA-ARTICULAR; INTRAMUSCULAR at 07:12

## 2023-12-22 NOTE — PROGRESS NOTES
[facility] Note    Subjective:    The HPI and pertinent ROS is included in the Diagnostic Impression Remarks section at the end of the note. Please see below for further details. Chief complaint is at end of note.     Karlene is a pleasant intelligent patient who is here for evaluation.     Modified Medications    No medications on file       Data reviewed 274}  Previous medical records reviewed and summarized in plan section at end of note.      If you are due for any health screening(s) below please notify me so we can arrange them to be ordered and scheduled. Most healthy patients at your age complete them, but you are free to accept or refuse. If you can't do it, I'll definitely understand. If you can, I'd certainly appreciate it!     Tests to Keep You Healthy    Mammogram: SCHEDULED  Colon Cancer Screening: Met on 5/9/2022  Last Blood Pressure <= 139/89 (12/22/2023): Yes      The following portions of the patient's history were reviewed and updated as appropriate: allergies, current medications, past family history, past medical history, past social history, past surgical history and problem list.    She  has a past medical history of Back spasm (06/07/2018), High cholesterol, Hormone replacement therapy (HRT) (06/07/2018), HTN (hypertension) (06/07/2018), Hyperlipidemia, mixed (06/07/2018), Hypertension, Insomnia (06/07/2018), and Rosacea (06/07/2018).  She  has a past surgical history that includes Plantar fascia surgery (Left); Gallbladder surgery; Appendectomy; right knee arthroscopy; Shoulder surgery (Right); Breast surgery (Left); Total abdominal hysterectomy w/ bilateral salpingoophorectomy; nephrolithiasis; Colonoscopy (N/A, 4/12/2019); Hysterectomy; Arthroscopy of knee (Right, 12/27/2019); Chondroplasty of knee (Right, 12/27/2019); Breast biopsy (Left, 15 yrs ago); and Colonoscopy (N/A, 5/9/2022).    She  reports that she has never smoked. She has never been exposed to tobacco smoke.  She has never used smokeless tobacco. She reports current alcohol use. She reports that she does not use drugs.  She family history includes Bone cancer in her maternal aunt; Breast cancer in her maternal aunt; Diabetes type II in her father; Hypertension in her father and mother; Polycystic kidney disease in her brother; Stroke (age of onset: 51) in her father.    Review of patient's allergies indicates:   Allergen Reactions    Hydrocodone Hives    Ciprofloxacin Hives    Tetanus vaccines and toxoid Hives and Swelling     Hives and arm was very swollen--as an adult       Tobacco Use: Low Risk  (12/22/2023)    Patient History     Smoking Tobacco Use: Never     Smokeless Tobacco Use: Never     Passive Exposure: Never     Physical Examination  General appearance: alert, cooperative, no distress  Neck: no thyromegaly, no neck stiffness  Lungs: clear to auscultation, no wheezes, rales or rhonchi, symmetric air entry  Heart: normal rate, regular rhythm, normal S1, S2, no murmurs, rubs, clicks or gallops  Abdomen: soft, nontender, nondistended, no rigidity, rebound, or guarding.   Back: no point tenderness over spine  Extremities: peripheral pulses normal, no unilateral leg swelling or calf tenderness   Neurological:alert, oriented, normal speech, no new focal findings or movement disorder noted from baseline    After verbal consent and cleansing with betadine and alcohol, skin was numbed with ethylene chloride spray 2cc 1% lidocaine was injected into the left shoulder after waiting 45 sec left  Shoulder was injected with Kenalog 40mg with 4 ml 1 % lidocaine. Patient tolerated procedure well.        BP Readings from Last 3 Encounters:   12/22/23 110/68   04/21/23 108/70   09/12/22 114/84     Wt Readings from Last 3 Encounters:   12/22/23 74 kg (163 lb 2.3 oz)   07/03/23 76 kg (167 lb 8.8 oz)   04/21/23 76 kg (167 lb 8.8 oz)     /68 (BP Location: Right arm, Patient Position: Sitting, BP Method: Large (Manual))    "Pulse 104   Temp 98.4 °F (36.9 °C) (Oral)   Resp 17   Ht 5' 1" (1.549 m)   Wt 74 kg (163 lb 2.3 oz)   SpO2 99%   BMI 30.83 kg/m²    274}  Laboratory: I have reviewed old labs below:    274}    Lab Results   Component Value Date    WBC 7.76 04/21/2023    HGB 11.6 (L) 04/21/2023    HCT 37.3 04/21/2023    MCV 92 04/21/2023     04/21/2023     04/21/2023    K 4.0 04/21/2023     04/21/2023    CALCIUM 9.4 04/21/2023    CO2 23 04/21/2023    GLU 95 04/21/2023    BUN 12 04/21/2023    CREATININE 0.8 04/21/2023    EGFRNORACEVR >60.0 04/21/2023    ANIONGAP 11 04/21/2023    PROT 6.9 04/21/2023    ALBUMIN 3.9 04/21/2023    BILITOT 0.4 04/21/2023    ALKPHOS 84 04/21/2023    ALT 29 04/21/2023    AST 24 04/21/2023    CHOL 151 04/21/2023    TRIG 118 04/21/2023    HDL 41 04/21/2023    LDLCALC 86.4 04/21/2023    TSH 1.507 04/21/2023    HGBA1C 6.0 (H) 04/21/2023      Lab reviewed by me: Particular labs of significance that I will monitor, workup, or treat to improve are mentioned below in diagnostic impression remarks.    The 10-year ASCVD risk score (Jabari JOHNSON, et al., 2019) is: 2.9%    Values used to calculate the score:      Age: 58 years      Sex: Female      Is Non- : No      Diabetic: No      Tobacco smoker: No      Systolic Blood Pressure: 110 mmHg      Is BP treated: Yes      HDL Cholesterol: 41 mg/dL      Total Cholesterol: 151 mg/dL    Imaging/EKG: I have reviewed the pertinent results and my findings are noted in remarks.  274}    CC:   Chief Complaint   Patient presents with    Annual Exam    Hypertension        274}    Assessment/Plan  Karlene Damon is a 58 y.o. female who presents to clinic with:  1. Rash    2. Chronic left shoulder pain    3. Primary hypertension    4. Hyperlipidemia, mixed    5. Prediabetes    6. Abnormal finding of blood chemistry, unspecified    7. Encounter for screening mammogram for malignant neoplasm of breast    8. Swelling       " "274}  Diagnostic Impression Remarks + HPI     Documentation entered by me for this encounter may have been done in part using speech-recognition technology. Although I have made an effort to ensure accuracy, "sound like" errors may exist and should be interpreted in context.     Left shoulder pain-patient reports she has had some chronic left shoulder pain for the past few months no weakness no falls or trauma worse when she abducts it patient reports no numbness or weakness has been taking some ibuprofen without complete relief recommend physical therapy patient sustained back a supraspinatus tendonitis and after discussing risk and benefits in shared decision-making for him to steroid injection today without any complications   Eczema- Has a rash on her extremity slight itchy for the past 2 weeks no bites no trauma will send in triamcinolone cream   Hypertension stable continue current meds monitor no headache or chest pain f/u blood work   HLD stable continue current meds monitor blood work rec mediterranean diet   Prediabetes- recommend low glycemic index diet monitor blood work rec to stay active       This is the extent of this pleasant patient's concerns at this present time. She did not feel chest pain upon exertion, dyspnea, nausea, vomiting, diaphoresis, or syncope. No pleuritic chest pain, unilateral leg swelling, calf tenderness, or calf pain. Negative for unintentional weight loss night sweats, hematuria, and fevers. Karlene will return to clinic in a few months for further workup and reassessment or sooner as needed. She was instructed to call the clinic or go to the emergency department or urgent care immediately if her symptoms do not improve, worsens, or if any new symptoms develop. As we discussed that symptoms could worsen over the next 24 hours she was advised that if any increased swelling, pain, or numbness arise to go immediately to the ED. Patient knows to call any time if an emergency arises. " Shared decision making occurred and she verbalized understanding in agreement with this plan. I discussed imaging findings, diagnosis, possibilities, treatment options, medications, risks, and benefits. She had many questions regarding the options and long-term effects. All questions were answered. She expressed understanding after counseling regarding the diagnosis and recommendations. She was capable and demonstrated competence with understanding of these options. Shared decision making was performed resulting in her choosing the current treatment plan. Patient handout was given with instructions and recommendations. Advised the patient that if they become pregnant to alert us immediately to assess for medication changes. Having a healthy weight can decrease the risk of 13 cancers and is an important goal. I also discussed the importance of close follow up to discuss labs, change or modify her medications if needed, monitor side effects, and further evaluation of medical problems.     Additional workup planned: see labs ordered below.    See below for labs and meds ordered with associated diagnosis      1. Rash  - triamcinolone acetonide 0.1% (KENALOG) 0.1 % cream; Apply to affected areas of body BID prn rash. Do not use on face, underarms, or groin.  Dispense: 80 g; Refill: 1    2. Chronic left shoulder pain  - LIDOcaine (PF) 10 mg/ml (1%) injection 40 mg  - triamcinolone acetonide injection 40 mg    3. Primary hypertension  - triamterene-hydrochlorothiazide 37.5-25 mg (DYAZIDE) 37.5-25 mg per capsule; Take 1 capsule by mouth daily as needed (swelling).  Dispense: 90 capsule; Refill: 3    4. Hyperlipidemia, mixed    5. Prediabetes  - Hemoglobin A1C; Future    6. Abnormal finding of blood chemistry, unspecified  - CBC Auto Differential; Future  - Comprehensive Metabolic Panel; Future  - Hemoglobin A1C; Future    7. Encounter for screening mammogram for malignant neoplasm of breast    8. Swelling  -  triamterene-hydrochlorothiazide 37.5-25 mg (DYAZIDE) 37.5-25 mg per capsule; Take 1 capsule by mouth daily as needed (swelling).  Dispense: 90 capsule; Refill: 3      Charlie Gu MD   274}    If you are due for any health screening(s) below please notify me so we can arrange them to be ordered and scheduled. Most healthy patients at your age complete them, but you are free to accept or refuse.     If you can't do it, I'll definitely understand. If you can, I'd certainly appreciate it!   Tests to Keep You Healthy    Mammogram: SCHEDULED  Colon Cancer Screening: Met on 5/9/2022  Last Blood Pressure <= 139/89 (12/22/2023): Yes

## 2023-12-26 RX ORDER — CYCLOBENZAPRINE HCL 10 MG
TABLET ORAL
Qty: 90 TABLET | Refills: 3 | Status: SHIPPED | OUTPATIENT
Start: 2023-12-26

## 2023-12-26 NOTE — TELEPHONE ENCOUNTER
No care due was identified.  [facility] Embedded Care Due Messages. Reference number: [id number].   12/26/2023 12:11:53 AM CST

## 2023-12-31 DIAGNOSIS — G47.00 INSOMNIA, UNSPECIFIED TYPE: ICD-10-CM

## 2023-12-31 NOTE — TELEPHONE ENCOUNTER
No care due was identified.  [facility] Embedded Care Due Messages. Reference number: [id number].   12/31/2023 10:58:35 AM CST

## 2024-01-02 RX ORDER — ZOLPIDEM TARTRATE 10 MG/1
TABLET ORAL
Qty: 90 TABLET | Refills: 1 | Status: SHIPPED | OUTPATIENT
Start: 2024-01-02

## 2024-04-15 ENCOUNTER — HOSPITAL ENCOUNTER (OUTPATIENT)
Dept: RADIOLOGY | Facility: CLINIC | Age: 59
Discharge: HOME | End: 2024-04-15
Attending: STUDENT IN AN ORGANIZED HEALTH CARE EDUCATION/TRAINING PROGRAM
Payer: COMMERCIAL

## 2024-04-15 DIAGNOSIS — Z12.31 OTHER SCREENING MAMMOGRAM: ICD-10-CM

## 2024-04-15 PROCEDURE — 77063 BREAST TOMOSYNTHESIS BI: CPT | Mod: 26,,, | Performed by: RADIOLOGY

## 2024-04-15 PROCEDURE — 77063 BREAST TOMOSYNTHESIS BI: CPT | Mod: TC,PO

## 2024-04-15 PROCEDURE — 77067 SCR MAMMO BI INCL CAD: CPT | Mod: 26,,, | Performed by: RADIOLOGY

## 2024-06-13 DIAGNOSIS — E78.5 HYPERLIPIDEMIA, UNSPECIFIED HYPERLIPIDEMIA TYPE: ICD-10-CM

## 2024-06-13 RX ORDER — SIMVASTATIN 10 MG/1
TABLET, FILM COATED ORAL
Qty: 90 TABLET | Refills: 3 | Status: SHIPPED | OUTPATIENT
Start: 2024-06-13

## 2024-06-13 NOTE — TELEPHONE ENCOUNTER
Refill Routing Note   Medication(s) are not appropriate for processing by [facility] for the following reason(s):        Required labs outdated    [facility] action(s):  Defer               Appointments  past 12m or future 3m with PCP    Date Provider   Last Visit   12/22/2023 Charlie Gu MD   Next Visit   Visit date not found Charlie Gu MD   ED visits in past 90 days: 0        Note composed:3:11 AM 06/13/2024

## 2024-06-13 NOTE — TELEPHONE ENCOUNTER
No care due was identified.  [facility] Embedded Care Due Messages. Reference number: [id number].   6/13/2024 1:03:36 AM CDT

## 2024-07-16 DIAGNOSIS — G47.00 INSOMNIA, UNSPECIFIED TYPE: ICD-10-CM

## 2024-07-17 RX ORDER — ZOLPIDEM TARTRATE 10 MG/1
TABLET ORAL
Qty: 30 TABLET | Refills: 1 | Status: SHIPPED | OUTPATIENT
Start: 2024-07-17

## 2024-07-17 NOTE — TELEPHONE ENCOUNTER
No care due was identified.  [facility] Embedded Care Due Messages. Reference number: [id number].   7/16/2024 8:35:06 PM CDT

## 2024-07-29 RX ORDER — DOXYCYCLINE HYCLATE 50 MG/1
50 CAPSULE ORAL
Qty: 90 CAPSULE | Refills: 3 | Status: SHIPPED | OUTPATIENT
Start: 2024-07-29

## 2024-10-23 DIAGNOSIS — G47.00 INSOMNIA, UNSPECIFIED TYPE: ICD-10-CM

## 2024-10-24 RX ORDER — ZOLPIDEM TARTRATE 10 MG/1
TABLET ORAL
Qty: 90 TABLET | Refills: 0 | Status: SHIPPED | OUTPATIENT
Start: 2024-10-24

## 2024-10-24 NOTE — TELEPHONE ENCOUNTER
Care Due:                  Date            Visit Type   Department     Provider  --------------------------------------------------------------------------------                                EP -                              PRIMARY      [facility]  Last Visit: 12-      CARE ([facility])   TERRIE Gu                              EP -                              PRIMARY      [facility]  Next Visit: 12-      CARE ([facility])   MEDICINE       Charlie Gu                                                            Last  Test          Frequency    Reason                     Performed    Due Date  --------------------------------------------------------------------------------    CMP.........  12 months..  amlodipine-benazepril,     12- 12-                             simvastatin,                             triamterene-hydrochloroth                             iazide...................    Lipid Panel.  12 months..  simvastatin..............  04-   04-    [facility] Embedded Care Due Messages. Reference number: [id number].   10/23/2024 9:22:12 PM CDT

## 2024-12-01 RX ORDER — AMLODIPINE AND BENAZEPRIL HYDROCHLORIDE 10; 20 MG/1; MG/1
CAPSULE ORAL
Qty: 90 CAPSULE | Refills: 0 | Status: SHIPPED | OUTPATIENT
Start: 2024-12-01

## 2024-12-01 NOTE — TELEPHONE ENCOUNTER
No care due was identified.  [facility] Embedded Care Due Messages. Reference number: [id number].   12/01/2024 9:03:48 AM CST

## 2024-12-02 RX ORDER — CYCLOBENZAPRINE HCL 10 MG
TABLET ORAL
Qty: 90 TABLET | Refills: 0 | Status: SHIPPED | OUTPATIENT
Start: 2024-12-02

## 2024-12-02 NOTE — TELEPHONE ENCOUNTER
Refill Routing Note   Medication(s) are not appropriate for processing by [facility] for the following reason(s):        Outside of protocol    [facility] action(s):  Route  Approve               Appointments  past 12m or future 3m with PCP    Date Provider   Last Visit   12/22/2023 Charlie Gu MD   Next Visit   12/18/2024 Charlie Gu MD   ED visits in past 90 days: 0        Note composed:8:02 PM 12/01/2024

## 2024-12-08 DIAGNOSIS — I10 PRIMARY HYPERTENSION: ICD-10-CM

## 2024-12-08 DIAGNOSIS — R60.9 SWELLING: ICD-10-CM

## 2024-12-08 NOTE — TELEPHONE ENCOUNTER
No care due was identified.  [facility] Embedded Care Due Messages. Reference number: [id number].   12/08/2024 7:10:51 AM CST

## 2024-12-09 RX ORDER — TRIAMTERENE AND HYDROCHLOROTHIAZIDE 37.5; 25 MG/1; MG/1
1 CAPSULE ORAL DAILY PRN
Qty: 90 CAPSULE | Refills: 3 | Status: SHIPPED | OUTPATIENT
Start: 2024-12-09 | End: 2025-12-09

## 2024-12-09 NOTE — TELEPHONE ENCOUNTER
Refill Routing Note   Medication(s) are not appropriate for processing by [facility] for the following reason(s):        Outside of protocol    [facility] action(s):  Route        Medication Therapy Plan: PRN usage outside [facility] protocl      Appointments  past 12m or future 3m with PCP    Date Provider   Last Visit   12/22/2023 Charlie Gu MD   Next Visit   12/18/2024 Charlie Gu MD   ED visits in past 90 days: 0        Note composed:2:18 AM 12/09/2024

## 2024-12-18 ENCOUNTER — LAB VISIT (OUTPATIENT)
Dept: LAB | Facility: HOSPITAL | Age: 59
End: 2024-12-18
Attending: STUDENT IN AN ORGANIZED HEALTH CARE EDUCATION/TRAINING PROGRAM
Payer: COMMERCIAL

## 2024-12-18 ENCOUNTER — OFFICE VISIT (OUTPATIENT)
Dept: FAMILY MEDICINE | Facility: CLINIC | Age: 59
End: 2024-12-18
Payer: COMMERCIAL

## 2024-12-18 ENCOUNTER — PATIENT MESSAGE (OUTPATIENT)
Dept: FAMILY MEDICINE | Facility: CLINIC | Age: 59
End: 2024-12-18

## 2024-12-18 VITALS
BODY MASS INDEX: 30.8 KG/M2 | DIASTOLIC BLOOD PRESSURE: 70 MMHG | TEMPERATURE: 98 F | OXYGEN SATURATION: 99 % | RESPIRATION RATE: 16 BRPM | SYSTOLIC BLOOD PRESSURE: 110 MMHG | WEIGHT: 163.13 LBS | HEIGHT: 61 IN | HEART RATE: 104 BPM

## 2024-12-18 DIAGNOSIS — Z00.00 ENCOUNTER FOR PREVENTIVE HEALTH EXAMINATION: Primary | ICD-10-CM

## 2024-12-18 DIAGNOSIS — Z00.00 ENCOUNTER FOR PREVENTIVE HEALTH EXAMINATION: ICD-10-CM

## 2024-12-18 DIAGNOSIS — I10 PRIMARY HYPERTENSION: ICD-10-CM

## 2024-12-18 DIAGNOSIS — M19.90 ARTHRITIS: ICD-10-CM

## 2024-12-18 DIAGNOSIS — G47.00 INSOMNIA, UNSPECIFIED TYPE: ICD-10-CM

## 2024-12-18 DIAGNOSIS — F51.01 PRIMARY INSOMNIA: ICD-10-CM

## 2024-12-18 DIAGNOSIS — G89.29 CHRONIC LEFT SHOULDER PAIN: ICD-10-CM

## 2024-12-18 DIAGNOSIS — E78.2 HYPERLIPIDEMIA, MIXED: ICD-10-CM

## 2024-12-18 DIAGNOSIS — M25.512 CHRONIC LEFT SHOULDER PAIN: ICD-10-CM

## 2024-12-18 LAB
ALBUMIN SERPL BCP-MCNC: 3.9 G/DL (ref 3.5–5.2)
ALP SERPL-CCNC: 70 U/L (ref 40–150)
ALT SERPL W/O P-5'-P-CCNC: 30 U/L (ref 10–44)
ANION GAP SERPL CALC-SCNC: 7 MMOL/L (ref 8–16)
AST SERPL-CCNC: 27 U/L (ref 10–40)
BILIRUB SERPL-MCNC: 0.3 MG/DL (ref 0.1–1)
BUN SERPL-MCNC: 13 MG/DL (ref 6–20)
CALCIUM SERPL-MCNC: 9.4 MG/DL (ref 8.7–10.5)
CHLORIDE SERPL-SCNC: 106 MMOL/L (ref 95–110)
CHOLEST SERPL-MCNC: 157 MG/DL (ref 120–199)
CHOLEST/HDLC SERPL: 4.6 {RATIO} (ref 2–5)
CO2 SERPL-SCNC: 25 MMOL/L (ref 23–29)
CREAT SERPL-MCNC: 0.8 MG/DL (ref 0.5–1.4)
ERYTHROCYTE [DISTWIDTH] IN BLOOD BY AUTOMATED COUNT: 12.9 % (ref 11.5–14.5)
EST. GFR  (NO RACE VARIABLE): >60 ML/MIN/1.73 M^2
ESTIMATED AVG GLUCOSE: 123 MG/DL (ref 68–131)
GLUCOSE SERPL-MCNC: 104 MG/DL (ref 70–110)
HBA1C MFR BLD: 5.9 % (ref 4–5.6)
HCT VFR BLD AUTO: 39.8 % (ref 37–48.5)
HDLC SERPL-MCNC: 34 MG/DL (ref 40–75)
HDLC SERPL: 21.7 % (ref 20–50)
HGB BLD-MCNC: 13.4 G/DL (ref 12–16)
LDLC SERPL CALC-MCNC: 82.4 MG/DL (ref 63–159)
MCH RBC QN AUTO: 30.8 PG (ref 27–31)
MCHC RBC AUTO-ENTMCNC: 33.7 G/DL (ref 32–36)
MCV RBC AUTO: 92 FL (ref 82–98)
NONHDLC SERPL-MCNC: 123 MG/DL
PLATELET # BLD AUTO: 390 K/UL (ref 150–450)
PMV BLD AUTO: 10.3 FL (ref 9.2–12.9)
POTASSIUM SERPL-SCNC: 4.2 MMOL/L (ref 3.5–5.1)
PROT SERPL-MCNC: 7.3 G/DL (ref 6–8.4)
RBC # BLD AUTO: 4.35 M/UL (ref 4–5.4)
SODIUM SERPL-SCNC: 138 MMOL/L (ref 136–145)
TRIGL SERPL-MCNC: 203 MG/DL (ref 30–150)
TSH SERPL DL<=0.005 MIU/L-ACNC: 1.53 UIU/ML (ref 0.4–4)
WBC # BLD AUTO: 8.15 K/UL (ref 3.9–12.7)

## 2024-12-18 PROCEDURE — 1159F MED LIST DOCD IN RCRD: CPT | Mod: CPTII,S$GLB,, | Performed by: STUDENT IN AN ORGANIZED HEALTH CARE EDUCATION/TRAINING PROGRAM

## 2024-12-18 PROCEDURE — 85027 COMPLETE CBC AUTOMATED: CPT | Performed by: STUDENT IN AN ORGANIZED HEALTH CARE EDUCATION/TRAINING PROGRAM

## 2024-12-18 PROCEDURE — 83036 HEMOGLOBIN GLYCOSYLATED A1C: CPT | Performed by: STUDENT IN AN ORGANIZED HEALTH CARE EDUCATION/TRAINING PROGRAM

## 2024-12-18 PROCEDURE — 80053 COMPREHEN METABOLIC PANEL: CPT | Performed by: STUDENT IN AN ORGANIZED HEALTH CARE EDUCATION/TRAINING PROGRAM

## 2024-12-18 PROCEDURE — 4010F ACE/ARB THERAPY RXD/TAKEN: CPT | Mod: CPTII,S$GLB,, | Performed by: STUDENT IN AN ORGANIZED HEALTH CARE EDUCATION/TRAINING PROGRAM

## 2024-12-18 PROCEDURE — 3078F DIAST BP <80 MM HG: CPT | Mod: CPTII,S$GLB,, | Performed by: STUDENT IN AN ORGANIZED HEALTH CARE EDUCATION/TRAINING PROGRAM

## 2024-12-18 PROCEDURE — 3008F BODY MASS INDEX DOCD: CPT | Mod: CPTII,S$GLB,, | Performed by: STUDENT IN AN ORGANIZED HEALTH CARE EDUCATION/TRAINING PROGRAM

## 2024-12-18 PROCEDURE — 80061 LIPID PANEL: CPT | Performed by: STUDENT IN AN ORGANIZED HEALTH CARE EDUCATION/TRAINING PROGRAM

## 2024-12-18 PROCEDURE — 99999 PR PBB SHADOW E&M-EST. PATIENT-LVL IV: CPT | Mod: PBBFAC,,, | Performed by: STUDENT IN AN ORGANIZED HEALTH CARE EDUCATION/TRAINING PROGRAM

## 2024-12-18 PROCEDURE — 3074F SYST BP LT 130 MM HG: CPT | Mod: CPTII,S$GLB,, | Performed by: STUDENT IN AN ORGANIZED HEALTH CARE EDUCATION/TRAINING PROGRAM

## 2024-12-18 PROCEDURE — 99396 PREV VISIT EST AGE 40-64: CPT | Mod: S$GLB,,, | Performed by: STUDENT IN AN ORGANIZED HEALTH CARE EDUCATION/TRAINING PROGRAM

## 2024-12-18 PROCEDURE — 36415 COLL VENOUS BLD VENIPUNCTURE: CPT | Mod: PO | Performed by: STUDENT IN AN ORGANIZED HEALTH CARE EDUCATION/TRAINING PROGRAM

## 2024-12-18 PROCEDURE — 84443 ASSAY THYROID STIM HORMONE: CPT | Performed by: STUDENT IN AN ORGANIZED HEALTH CARE EDUCATION/TRAINING PROGRAM

## 2024-12-18 RX ORDER — ZOLPIDEM TARTRATE 10 MG/1
10 TABLET ORAL DAILY
Qty: 90 TABLET | Refills: 0 | Status: SHIPPED | OUTPATIENT
Start: 2024-12-18

## 2024-12-18 NOTE — PROGRESS NOTES
[facility] Note    Subjective:    The HPI and pertinent ROS is included in the Diagnostic Impression Remarks section at the end of the note. Please see below for further details. Chief complaint is at end of note.     Karlene is a pleasant intelligent patient who is here for evaluation.     Modified Medications    Modified Medication Previous Medication    ZOLPIDEM (AMBIEN) 10 MG TAB zolpidem (AMBIEN) 10 mg Tab       Take 1 tablet (10 mg total) by mouth once daily.    TAKE 1 TABLET BY MOUTH EVERYDAY AT BEDTIME       Data reviewed 274}  Previous medical records reviewed and summarized in plan section at end of note.      If you are due for any health screening(s) below please notify me so we can arrange them to be ordered and scheduled. Most healthy patients at your age complete them, but you are free to accept or refuse. If you can't do it, I'll definitely understand. If you can, I'd certainly appreciate it!     All of your core healthy metrics are met.      The following portions of the patient's history were reviewed and updated as appropriate: allergies, current medications, past family history, past medical history, past social history, past surgical history and problem list.    She  has a past medical history of Back spasm (06/07/2018), High cholesterol, Hormone replacement therapy (HRT) (06/07/2018), HTN (hypertension) (06/07/2018), Hyperlipidemia, mixed (06/07/2018), Hypertension, Insomnia (06/07/2018), and Rosacea (06/07/2018).  She  has a past surgical history that includes Plantar fascia surgery (Left); Gallbladder surgery; Appendectomy; right knee arthroscopy; Shoulder surgery (Right); Breast surgery (Left); Total abdominal hysterectomy w/ bilateral salpingoophorectomy; nephrolithiasis; Colonoscopy (N/A, 4/12/2019); Hysterectomy; Arthroscopy of knee (Right, 12/27/2019); Chondroplasty of knee (Right, 12/27/2019); Breast biopsy (Left, 15 yrs ago); and Colonoscopy (N/A, 5/9/2022).    She   "reports that she has never smoked. She has never been exposed to tobacco smoke. She has never used smokeless tobacco. She reports current alcohol use. She reports that she does not use drugs.  She family history includes Bone cancer in her maternal aunt; Breast cancer in her maternal aunt; Diabetes type II in her father; Hypertension in her father and mother; Polycystic kidney disease in her brother; Stroke (age of onset: 51) in her father.    Review of patient's allergies indicates:   Allergen Reactions    Hydrocodone Hives    Ciprofloxacin Hives    Tetanus vaccines and toxoid Hives and Swelling     Hives and arm was very swollen--as an adult       Tobacco Use: Low Risk  (12/18/2024)    Patient History     Smoking Tobacco Use: Never     Smokeless Tobacco Use: Never     Passive Exposure: Never     Physical Examination  Physical Exam       General appearance: alert, cooperative, no distress  Neck: no thyromegaly, no neck stiffness  Lungs: clear to auscultation, no wheezes, rales or rhonchi, symmetric air entry  Heart: normal rate, regular rhythm, normal S1, S2, no murmurs, rubs, clicks or gallops  Abdomen: soft, nontender, nondistended, no rigidity, rebound, or guarding.   Back: no point tenderness over spine  Extremities: peripheral pulses normal, no unilateral leg swelling or calf tenderness   Neurological:alert, oriented, normal speech, no new focal findings or movement disorder noted from baseline      BP Readings from Last 3 Encounters:   12/18/24 110/70   12/22/23 110/68   04/21/23 108/70     Wt Readings from Last 3 Encounters:   12/18/24 74 kg (163 lb 2.3 oz)   12/22/23 74 kg (163 lb 2.3 oz)   07/03/23 76 kg (167 lb 8.8 oz)     /70 (BP Location: Right arm, Patient Position: Sitting)   Pulse 104   Temp 98.4 °F (36.9 °C) (Oral)   Resp 16   Ht 5' 1" (1.549 m)   Wt 74 kg (163 lb 2.3 oz)   SpO2 99%   BMI 30.83 kg/m²    274}  Laboratory: I have reviewed old labs below:    274}    Lab Results   Component " Value Date    WBC 6.61 12/22/2023    HGB 12.6 12/22/2023    HCT 36.7 (L) 12/22/2023    MCV 86 12/22/2023     12/22/2023     12/22/2023    K 4.1 12/22/2023     12/22/2023    CALCIUM 9.7 12/22/2023    CO2 27 12/22/2023     12/22/2023    BUN 17 12/22/2023    CREATININE 0.8 12/22/2023    EGFRNORACEVR >60.0 12/22/2023    ANIONGAP 9 12/22/2023    PROT 7.6 12/22/2023    ALBUMIN 4.1 12/22/2023    BILITOT 0.4 12/22/2023    ALKPHOS 72 12/22/2023    ALT 29 12/22/2023    AST 20 12/22/2023    CHOL 151 04/21/2023    TRIG 118 04/21/2023    HDL 41 04/21/2023    LDLCALC 86.4 04/21/2023    TSH 1.507 04/21/2023    HGBA1C 5.9 (H) 12/22/2023      Lab reviewed by me: Particular labs of significance that I will monitor, workup, or treat to improve are mentioned below in diagnostic impression remarks.    The 10-year ASCVD risk score (Jabari JOHNSON, et al., 2019) is: 3.2%    Values used to calculate the score:      Age: 59 years      Sex: Female      Is Non- : No      Diabetic: No      Tobacco smoker: No      Systolic Blood Pressure: 110 mmHg      Is BP treated: Yes      HDL Cholesterol: 41 mg/dL      Total Cholesterol: 151 mg/dL    Results         Imaging/EKG: I have reviewed the pertinent results and my findings are noted in remarks.  274}    CC:   Chief Complaint   Patient presents with    Annual Exam    Hypertension    Hyperlipidemia        274}    History of Present Illness         Assessment/Plan  Karlene Damon is a 59 y.o. female who presents to clinic with:  1. Encounter for preventive health examination    2. Primary hypertension    3. Hyperlipidemia, mixed    4. Arthritis    5. Primary insomnia    6. Insomnia, unspecified type    7. Chronic left shoulder pain       274}    Assessment & Plan       Hypertension -stable continue current meds monitor no headache or chest pain f/u blood work   HLD stable continue current meds monitor blood work rec mediterranean diet   Insomnia  stable continue current meds   Arthritis stable continue nsaids prn monitor   Left shoulder pain monitor rec PT     This note was generated with the assistance of ambient listening technology. Verbal consent was obtained by the patient and accompanying visitor(s) for the recording of patient appointment to facilitate this note. I attest to having reviewed and edited the generated note for accuracy, though some syntax or spelling errors may persist. Please contact the author of this note for any clarification.      1. Encounter for preventive health examination  - Lipid Panel; Future  - Hemoglobin A1C; Future  - TSH; Future  - CBC Without Differential; Future  - Comprehensive Metabolic Panel; Future    2. Primary hypertension  - Lipid Panel; Future  - Hemoglobin A1C; Future  - TSH; Future  - CBC Without Differential; Future  - Comprehensive Metabolic Panel; Future    3. Hyperlipidemia, mixed  - Lipid Panel; Future  - Hemoglobin A1C; Future  - TSH; Future  - CBC Without Differential; Future  - Comprehensive Metabolic Panel; Future    4. Arthritis    5. Primary insomnia    6. Insomnia, unspecified type  - zolpidem (AMBIEN) 10 mg Tab; Take 1 tablet (10 mg total) by mouth once daily.  Dispense: 90 tablet; Refill: 0    7. Chronic left shoulder pain      Charlie Gu MD   274}    If you are due for any health screening(s) below please notify me so we can arrange them to be ordered and scheduled. Most healthy patients at your age complete them, but you are free to accept or refuse.     If you can't do it, I'll definitely understand. If you can, I'd certainly appreciate it!   All of your core healthy metrics are met.

## 2025-02-26 RX ORDER — CYCLOBENZAPRINE HCL 10 MG
TABLET ORAL
Qty: 90 TABLET | Refills: 0 | Status: SHIPPED | OUTPATIENT
Start: 2025-02-26

## 2025-02-26 RX ORDER — AMLODIPINE AND BENAZEPRIL HYDROCHLORIDE 10; 20 MG/1; MG/1
1 CAPSULE ORAL
Qty: 90 CAPSULE | Refills: 3 | Status: SHIPPED | OUTPATIENT
Start: 2025-02-26

## 2025-02-26 NOTE — TELEPHONE ENCOUNTER
Refill Routing Note   Medication(s) are not appropriate for processing by [facility] for the following reason(s):        Outside of protocol    [facility] action(s):  Approve  Route             Appointments  past 12m or future 3m with PCP    Date Provider   Last Visit   12/18/2024 Charlie Gu MD   Next Visit   Visit date not found Charlie Gu MD   ED visits in past 90 days: 0        Note composed:5:16 AM 02/26/2025

## 2025-02-26 NOTE — TELEPHONE ENCOUNTER
No care due was identified.  [facility] Embedded Care Due Messages. Reference number: [id number].   2/26/2025 12:12:04 AM CST

## 2025-05-24 NOTE — TELEPHONE ENCOUNTER
No care due was identified.  [facility] Embedded Care Due Messages. Reference number: [id number].   5/24/2025 6:56:00 AM CDT

## 2025-05-25 DIAGNOSIS — G47.00 INSOMNIA, UNSPECIFIED TYPE: ICD-10-CM

## 2025-05-25 RX ORDER — CYCLOBENZAPRINE HCL 10 MG
TABLET ORAL
Qty: 90 TABLET | Refills: 0 | Status: CANCELLED | OUTPATIENT
Start: 2025-05-25

## 2025-05-25 NOTE — TELEPHONE ENCOUNTER
No care due was identified.  [facility] Embedded Care Due Messages. Reference number: [id number].   5/25/2025 6:55:52 PM CDT

## 2025-05-26 ENCOUNTER — HOSPITAL ENCOUNTER (OUTPATIENT)
Dept: RADIOLOGY | Facility: CLINIC | Age: 60
Discharge: HOME | End: 2025-05-26
Attending: STUDENT IN AN ORGANIZED HEALTH CARE EDUCATION/TRAINING PROGRAM
Payer: COMMERCIAL

## 2025-05-26 DIAGNOSIS — Z12.31 ENCOUNTER FOR SCREENING MAMMOGRAM FOR BREAST CANCER: ICD-10-CM

## 2025-05-26 PROCEDURE — 77063 BREAST TOMOSYNTHESIS BI: CPT | Mod: 26,,, | Performed by: RADIOLOGY

## 2025-05-26 PROCEDURE — 77063 BREAST TOMOSYNTHESIS BI: CPT | Mod: TC,PO

## 2025-05-26 PROCEDURE — 77067 SCR MAMMO BI INCL CAD: CPT | Mod: 26,,, | Performed by: RADIOLOGY

## 2025-05-26 RX ORDER — ZOLPIDEM TARTRATE 10 MG/1
10 TABLET ORAL DAILY
Qty: 90 TABLET | Refills: 0 | Status: SHIPPED | OUTPATIENT
Start: 2025-05-26

## 2025-05-26 RX ORDER — CYCLOBENZAPRINE HCL 10 MG
TABLET ORAL
Qty: 90 TABLET | Refills: 0 | Status: SHIPPED | OUTPATIENT
Start: 2025-05-26

## 2025-05-27 ENCOUNTER — RESULTS FOLLOW-UP (OUTPATIENT)
Dept: FAMILY MEDICINE | Facility: CLINIC | Age: 60
End: 2025-05-27

## 2025-06-06 DIAGNOSIS — E78.5 HYPERLIPIDEMIA, UNSPECIFIED HYPERLIPIDEMIA TYPE: ICD-10-CM

## 2025-06-06 RX ORDER — SIMVASTATIN 10 MG/1
10 TABLET, FILM COATED ORAL NIGHTLY
Qty: 90 TABLET | Refills: 1 | Status: SHIPPED | OUTPATIENT
Start: 2025-06-06

## 2025-07-09 ENCOUNTER — E-VISIT (OUTPATIENT)
Dept: FAMILY MEDICINE | Facility: CLINIC | Age: 60
End: 2025-07-09
Payer: COMMERCIAL

## 2025-07-09 DIAGNOSIS — E78.5 HYPERLIPIDEMIA, UNSPECIFIED HYPERLIPIDEMIA TYPE: ICD-10-CM

## 2025-07-09 DIAGNOSIS — E78.2 HYPERLIPIDEMIA, MIXED: Primary | ICD-10-CM

## 2025-07-09 DIAGNOSIS — I10 PRIMARY HYPERTENSION: ICD-10-CM

## 2025-07-09 DIAGNOSIS — R60.9 SWELLING: ICD-10-CM

## 2025-07-09 RX ORDER — DOXYCYCLINE HYCLATE 50 MG/1
50 CAPSULE, GELATIN COATED ORAL
Qty: 90 CAPSULE | Refills: 3 | OUTPATIENT
Start: 2025-07-09

## 2025-07-11 RX ORDER — CYCLOBENZAPRINE HCL 10 MG
TABLET ORAL
Qty: 90 TABLET | Refills: 0 | Status: SHIPPED | OUTPATIENT
Start: 2025-07-11

## 2025-07-11 RX ORDER — SIMVASTATIN 10 MG/1
10 TABLET, FILM COATED ORAL NIGHTLY
Qty: 90 TABLET | Refills: 1 | Status: SHIPPED | OUTPATIENT
Start: 2025-07-11

## 2025-07-11 RX ORDER — TRIAMTERENE AND HYDROCHLOROTHIAZIDE 37.5; 25 MG/1; MG/1
1 CAPSULE ORAL DAILY PRN
Qty: 90 CAPSULE | Refills: 3 | Status: SHIPPED | OUTPATIENT
Start: 2025-07-11 | End: 2026-07-11

## 2025-07-11 RX ORDER — AMLODIPINE AND BENAZEPRIL HYDROCHLORIDE 10; 20 MG/1; MG/1
1 CAPSULE ORAL DAILY
Qty: 90 CAPSULE | Refills: 1 | Status: SHIPPED | OUTPATIENT
Start: 2025-07-11

## 2025-07-11 NOTE — PROGRESS NOTES
Patient ID: Karlene Damon is a 60 y.o. female.    Chief Complaint: Medication Management (Entered automatically based on patient selection in [employer].)          274}  The patient initiated a request through [employer] on 7/9/2025 for evaluation and management with a chief complaint of Medication Management (Entered automatically based on patient selection in [employer].)     I evaluated the questionnaire submission on 07/11/2025 .    Total Time (in minutes): 11     [facility] Peq Evisit Medication    7/10/2025  9:48 PM CDT - Filed by Patient   Do you agree to participate in an E-Visit? Yes   If you have any of the following symptoms, please present to your local emergency room or call 911:  I acknowledge   Medication requests for narcotics will not be addressed via an E-Visit.  Please schedule an appointment. I acknowledge   Choose the state of your primary residence [place]   Do you want to address a new or existing medication? (Start a new medication, Address a current medication) Address a current medication   What is the main issue you would like addressed today? I am not sure what medication you need information about.   Would you like to change or continue your medication? (Change medication, Continue medication) Continue medication   What is the name of the medication you would like to continue? All of them. Only what you have on file. You are the only provider that prescribes my medications.   Are you taking your medication as prescribed? Yes   Which option below best describes the reason for your request? (Renew refills, Prior authorization is required) Renew refills    What medical condition is the  medication intended to treat? Blood pressure, cholesterol, doxycycline, cyclobenzaprin andzolpidem   Has the medication helped your condition? (Yes, No, Not sure) Yes   Have you experienced any side effects from the medication? No   Provide any information you feel is important to your history not asked above     Please attach any relevant images or files    Are you able to take your vitals? No          Active Problem List with Overview Notes    Diagnosis Date Noted    Arthritis 09/12/2022    Chondromalacia of right patella 12/27/2019    HTN (hypertension) 06/07/2018    Hormone replacement therapy (HRT) 06/07/2018    Insomnia 06/07/2018    Back spasm 06/07/2018    Hyperlipidemia, mixed 06/07/2018    Rosacea 06/07/2018    Obesity, Class I, BMI 30-34.9 06/07/2018    Bodies, loose, knee, right 12/12/2017      Recent Labs Obtained:  Lab Results   Component Value Date    WBC 8.15 12/18/2024    HGB 13.4 12/18/2024    HCT 39.8 12/18/2024    MCV 92 12/18/2024     12/18/2024     12/18/2024    K 4.2 12/18/2024     12/18/2024    CREATININE 0.8 12/18/2024    EGFRNORACEVR >60.0 12/18/2024    HGBA1C 5.9 (H) 12/18/2024    TSH 1.530 12/18/2024      Review of patient's allergies indicates:   Allergen Reactions    Hydrocodone Hives    Ciprofloxacin Hives    Tetanus vaccines and toxoid Hives and Swelling     Hives and arm was very swollen--as an adult       Encounter Diagnoses   Name Primary?    Hyperlipidemia, unspecified hyperlipidemia type     Primary hypertension     Swelling     Hyperlipidemia, mixed Yes        No orders of the defined types were placed in this encounter.     Medications Ordered This Encounter   Medications    amlodipine-benazepril 10-20mg (LOTREL) 10-20 mg per capsule     Sig: Take 1 capsule by mouth once daily.     Dispense:  90 capsule     Refill:  1    cyclobenzaprine (FLEXERIL) 10 MG tablet     Sig: TAKE 1 TABLET BY MOUTH EVERY DAY AT BEDTIME AS NEEDED     Dispense:  90 tablet     Refill:  0    simvastatin (ZOCOR) 10 MG tablet     Sig: Take 1 tablet (10 mg total) by mouth every evening.     Dispense:  90 tablet     Refill:  1    triamterene-hydrochlorothiazide 37.5-25 mg (DYAZIDE) 37.5-25 mg per capsule     Sig: Take 1 capsule by mouth daily as needed (swelling).     Dispense:  90 capsule      Refill:  3     .        E-Visit Time Tracking:    Day 1 Time (in minutes): 11    Total Time (in minutes): 11      274}

## 2025-07-18 ENCOUNTER — OFFICE VISIT (OUTPATIENT)
Dept: PODIATRY | Facility: CLINIC | Age: 60
End: 2025-07-18
Payer: COMMERCIAL

## 2025-07-18 VITALS — RESPIRATION RATE: 16 BRPM | BODY MASS INDEX: 31.39 KG/M2 | HEIGHT: 61 IN | WEIGHT: 166.25 LBS

## 2025-07-18 DIAGNOSIS — L60.0 INGROWN NAIL: Primary | ICD-10-CM

## 2025-07-18 DIAGNOSIS — L03.039 PARONYCHIA OF GREAT TOE: ICD-10-CM

## 2025-07-18 DIAGNOSIS — M79.674 PAIN OF TOE OF RIGHT FOOT: ICD-10-CM

## 2025-07-18 PROCEDURE — 99999 PR PBB SHADOW E&M-EST. PATIENT-LVL IV: CPT | Mod: PBBFAC,,, | Performed by: PODIATRIST

## 2025-07-18 NOTE — PROGRESS NOTES
"  [address] [place] [address]  Phone: [phone]   [phone]    Patient's PCP:Charlie Gu MD  Referring Provider: Aaareferral Self    Subjective:      Chief Complaint:: Ingrown Toenail (Right first lateral border)    OMKAR Damon is a 60 y.o. female who presents today with a complaint of right great toe lateral border ingrown nail. The current episode started one week.  The symptoms include throbbing pain, previous swelling and drainage on Monday. Probable cause of complaint steel toe boots.  The symptoms are aggravated by shoe gear, walking, and weightbearing . The problem has stayed the same. Treatment to date have included neosporin with lidocaine and aleve  which provided some relief.       Vitals:    07/18/25 0906   Resp: 16   Weight: 75.4 kg (166 lb 3.6 oz)   Height: 5' 1" (1.549 m)   PainSc:   4      Shoe Size:   8.5  Past Surgical History:   Procedure Laterality Date    APPENDECTOMY      ARTHROSCOPY OF KNEE Right 12/27/2019    Procedure: ARTHROSCOPY, KNEE;  Surgeon: Dontrell Rodriguez Jr., MD;  Location: [facility] OR;  Service: Orthopedics;  Laterality: Right;  patella.    BREAST BIOPSY Left 15 yrs ago    benign    BREAST SURGERY Left     benign cyst removal x2    CHONDROPLASTY OF KNEE Right 12/27/2019    Procedure: CHONDROPLASTY, KNEE;  Surgeon: Dontrell Rodriguez Jr., MD;  Location: [facility] OR;  Service: Orthopedics;  Laterality: Right;  patella.    COLONOSCOPY N/A 4/12/2019    Procedure: COLONOSCOPY;  Surgeon: Dennis Rosario MD;  Location: [facility];  Service: Endoscopy;  Laterality: N/A;    COLONOSCOPY N/A 5/9/2022    Procedure: COLONOSCOPY;  Surgeon: Dennis Rosario MD;  Location: [facility] ENDO;  Service: Endoscopy;  Laterality: N/A;    GALLBLADDER SURGERY      HYSTERECTOMY      nephrolithiasis      PLANTAR FASCIA SURGERY Left     right knee arthroscopy      SHOULDER SURGERY Right     arthroscopy    TOTAL ABDOMINAL HYSTERECTOMY W/ BILATERAL SALPINGOOPHORECTOMY      " ovarian cysts     Past Medical History:   Diagnosis Date    Back spasm 06/07/2018    High cholesterol     Hormone replacement therapy (HRT) 06/07/2018    HTN (hypertension) 06/07/2018    Hyperlipidemia, mixed 06/07/2018    Hypertension     Insomnia 06/07/2018    Rosacea 06/07/2018     Family History   Problem Relation Name Age of Onset    Hypertension Mother      Hypertension Father      Stroke Father  51    Diabetes type II Father      Polycystic kidney disease Brother      Bone cancer Maternal Aunt      Breast cancer Maternal Aunt      Melanoma Neg Hx      Psoriasis Neg Hx      Lupus Neg Hx      Eczema Neg Hx          Social History:   Marital Status:   Alcohol History:  reports current alcohol use.  Tobacco History:  reports that she has never smoked. She has never been exposed to tobacco smoke. She has never used smokeless tobacco.  Drug History:  reports no history of drug use.    Review of patient's allergies indicates:   Allergen Reactions    Hydrocodone Hives    Ciprofloxacin Hives    Tetanus vaccines and toxoid Hives and Swelling     Hives and arm was very swollen--as an adult       Current Medications[1]    Review of Systems   Constitutional:  Negative for chills, fatigue, fever and unexpected weight change.   HENT:  Negative for hearing loss and trouble swallowing.    Eyes:  Negative for photophobia and visual disturbance.   Respiratory:  Negative for cough, shortness of breath and wheezing.    Cardiovascular:  Negative for chest pain, palpitations and leg swelling.   Gastrointestinal:  Negative for abdominal pain and nausea.   Genitourinary:  Negative for dysuria and frequency.   Musculoskeletal:  Negative for arthralgias, back pain, gait problem, joint swelling and myalgias.   Skin:  Positive for color change. Negative for rash.   Neurological:  Negative for tremors, seizures, speech difficulty, weakness and headaches.   Hematological:  Does not bruise/bleed easily.         Objective:         Physical Exam:   Foot Exam    General  General Appearance: appears stated age and healthy   Orientation: alert and oriented to person, place, and time   Affect: appropriate   Gait: unimpaired       Right Foot/Ankle     Inspection and Palpation  Ecchymosis: none  Tenderness: (lateral border great toe)  Swelling: (lateral border great toenail)  Arch: normal  Skin Exam: erythema;   Neurovascular  Dorsalis pedis: 2+  Posterior tibial: 2+  Capillary Refill: 2+  Varicose veins: not present  Saphenous nerve sensation: normal  Tibial nerve sensation: normal  Superficial peroneal nerve sensation: normal  Deep peroneal nerve sensation: normal  Sural nerve sensation: normal    Edema  Type of edema: non-pitting    Muscle Strength  Ankle dorsiflexion: 5  Ankle plantar flexion: 5  Ankle inversion: 5  Ankle eversion: 5  Great toe extension: 5  Great toe flexion: 5    Comments  Ingrowing lateral border of the great toenail.  Tender to palpation.  Mild edema and erythema are present.  No purulence.        Physical Exam  Cardiovascular:      Pulses:           Dorsalis pedis pulses are 2+ on the right side.        Posterior tibial pulses are 2+ on the right side.   Feet:      Right foot:      Skin integrity: Erythema present.               Right Ankle/Foot Exam     Comments:  Ingrowing lateral border of the great toenail.  Tender to palpation.  Mild edema and erythema are present.  No purulence.        Muscle Strength   Right Lower Extremity   Ankle Dorsiflexion:  5   Plantar flexion:  5/5    Vascular Exam     Right Pulses  Dorsalis Pedis:      2+  Posterior Tibial:      2+           Imaging: none            Assessment:       1. Ingrown nail    2. Pain of toe of right foot    3. Paronychia of great toe      Plan:   Ingrown nail  -     Nail Removal    Pain of toe of right foot  -     Nail Removal    Paronychia of great toe      Follow up if symptoms worsen or fail to improve.    We discussed ingrown toenail treatment options of no  treatment, avulsion of nail border under local with regrowth of nail, chemical matrixectomy for attempted permanent correction of the problem. Patient was educated about daily dressing changes, soaks, and medications following removal of the nail.       Nail Removal    Date/Time: 7/18/2025 9:00 AM    Performed by: Ryan Blanchard DPM  Authorized by: Ryan Blanchard DPM    Consent Done?:  Yes (Written)  Time out: Immediately prior to the procedure a time out was called    Location:     Location:  Right foot    Location detail:  Right big toe  Anesthesia:     Anesthesia:  Local infiltration    Local anesthetic:  Lidocaine 2% without epinephrine  Procedure Details:     Preparation:  Skin prepped with alcohol    Amount removed:  Partial    Side:  Lateral    Wedge excision of skin of nail fold: No      Nail bed sutured?: No      Nail matrix removed:  Partial    Removal method:  Phenol and alcohol    Dressing applied:  Dressing applied    Patient tolerance:  Patient tolerated the procedure well with no immediate complications     4 applications of Phenol EZ swabs 89% was applied.               Counseling:     I provided patient education verbally regarding:   Patient diagnosis, treatment options, as well as alternatives, risks, and benefits.     This note was created using Dragon voice recognition software that occasionally misinterpreted phrases or words.                      [1]   Current Outpatient Medications   Medication Sig Dispense Refill    amlodipine-benazepril 10-20mg (LOTREL) 10-20 mg per capsule Take 1 capsule by mouth once daily. 90 capsule 1    cetirizine (ZYRTEC) 10 MG tablet Take 10 mg by mouth once daily.      cyclobenzaprine (FLEXERIL) 10 MG tablet TAKE 1 TABLET BY MOUTH EVERY DAY AT BEDTIME AS NEEDED 90 tablet 0    doxycycline 50 MG capsule TAKE 1 CAPSULE BY MOUTH ONCE DAILY. 90 capsule 3    fluticasone (FLONASE) 50 mcg/actuation nasal spray 1 spray by Each Nostril route as needed.       magnesium 250 mg  Tab Take 250 mg by mouth once daily.      metronidazole 1% (METROGEL) 1 % Gel Apply topically once daily. 60 g 11    omega 3-dha-epa-fish oil 1,000 mg (120 mg-180 mg) Cap Take 1 capsule by mouth once daily.      potassium 99 mg Tab Take 2 tablets by mouth once daily.      simvastatin (ZOCOR) 10 MG tablet Take 1 tablet (10 mg total) by mouth every evening. 90 tablet 1    triamcinolone acetonide 0.1% (KENALOG) 0.1 % cream Apply to affected areas of body BID prn rash. Do not use on face, underarms, or groin. 80 g 1    triamterene-hydrochlorothiazide 37.5-25 mg (DYAZIDE) 37.5-25 mg per capsule Take 1 capsule by mouth daily as needed (swelling). 90 capsule 3    zolpidem (AMBIEN) 10 mg Tab Take 1 tablet (10 mg total) by mouth once daily. 90 tablet 0     No current facility-administered medications for this visit.

## 2025-07-18 NOTE — PATIENT INSTRUCTIONS

## 2025-07-30 NOTE — PATIENT INSTRUCTIONS

## 2025-08-01 ENCOUNTER — OFFICE VISIT (OUTPATIENT)
Dept: PODIATRY | Facility: CLINIC | Age: 60
End: 2025-08-01
Payer: COMMERCIAL

## 2025-08-01 VITALS — HEIGHT: 61 IN | HEART RATE: 89 BPM | OXYGEN SATURATION: 98 % | WEIGHT: 164.88 LBS | BODY MASS INDEX: 31.13 KG/M2

## 2025-08-01 DIAGNOSIS — M79.675 PAIN OF TOE OF LEFT FOOT: ICD-10-CM

## 2025-08-01 DIAGNOSIS — L60.0 INGROWN NAIL: Primary | ICD-10-CM

## 2025-08-01 PROCEDURE — 99999 PR PBB SHADOW E&M-EST. PATIENT-LVL IV: CPT | Mod: PBBFAC,,, | Performed by: PODIATRIST

## 2025-08-01 NOTE — PROGRESS NOTES
"  [address]  [place] [address]  Phone: [phone]   [phone]    Patient's PCP:Charlie Gu MD  Referring Provider: No ref. provider found    Subjective:      Chief Complaint:: Ingrown Toenail (Left foot big toe )    Ingrown Toenail  Pertinent negatives include no abdominal pain, arthralgias, chest pain, chills, coughing, fatigue, fever, headaches, joint swelling, myalgias, nausea, neck pain, numbness, rash or weakness.     Karlene Damon is a 60 y.o. female who presents today with a complaint of left foot big toe ingrown bilateral border. The current episode started a couple years ago.  The symptoms include aching pain. Probable cause of complaint unknown.  The symptoms are aggravated by pressure. The problem has stayed the same.  Patient states that she had previous ingrown procedure on the toe years ago.         Vitals:    08/01/25 0815   Pulse: 89   SpO2: 98%   Weight: 74.8 kg (164 lb 14.5 oz)   Height: 5' 1" (1.549 m)   PainSc:   4      Shoe Size: 8.5    Past Surgical History:   Procedure Laterality Date    APPENDECTOMY      ARTHROSCOPY OF KNEE Right 12/27/2019    Procedure: ARTHROSCOPY, KNEE;  Surgeon: Dontrell Rodriguez Jr., MD;  Location: [facility] OR;  Service: Orthopedics;  Laterality: Right;  patella.    BREAST BIOPSY Left 15 yrs ago    benign    BREAST SURGERY Left     benign cyst removal x2    CHONDROPLASTY OF KNEE Right 12/27/2019    Procedure: CHONDROPLASTY, KNEE;  Surgeon: Dontrell Rodriguez Jr., MD;  Location: [facility] OR;  Service: Orthopedics;  Laterality: Right;  patella.    COLONOSCOPY N/A 4/12/2019    Procedure: COLONOSCOPY;  Surgeon: Dennis Rosario MD;  Location: [facility];  Service: Endoscopy;  Laterality: N/A;    COLONOSCOPY N/A 5/9/2022    Procedure: COLONOSCOPY;  Surgeon: Dennis Rosario MD;  Location: [facility];  Service: Endoscopy;  Laterality: N/A;    GALLBLADDER SURGERY      HYSTERECTOMY      nephrolithiasis      PLANTAR FASCIA SURGERY Left     right " knee arthroscopy      SHOULDER SURGERY Right     arthroscopy    TOTAL ABDOMINAL HYSTERECTOMY W/ BILATERAL SALPINGOOPHORECTOMY      ovarian cysts     Past Medical History:   Diagnosis Date    Back spasm 06/07/2018    High cholesterol     Hormone replacement therapy (HRT) 06/07/2018    HTN (hypertension) 06/07/2018    Hyperlipidemia, mixed 06/07/2018    Hypertension     Insomnia 06/07/2018    Rosacea 06/07/2018     Family History   Problem Relation Name Age of Onset    Hypertension Mother      Hypertension Father      Stroke Father  51    Diabetes type II Father      Polycystic kidney disease Brother      Bone cancer Maternal Aunt      Breast cancer Maternal Aunt      Melanoma Neg Hx      Psoriasis Neg Hx      Lupus Neg Hx      Eczema Neg Hx          Social History:   Marital Status:   Alcohol History:  reports current alcohol use.  Tobacco History:  reports that she has never smoked. She has never been exposed to tobacco smoke. She has never used smokeless tobacco.  Drug History:  reports no history of drug use.    Review of patient's allergies indicates:   Allergen Reactions    Hydrocodone Hives    Ciprofloxacin Hives    Tetanus vaccines and toxoid Hives and Swelling     Hives and arm was very swollen--as an adult       Current Medications[1]    Review of Systems   Constitutional:  Negative for chills, fatigue, fever and unexpected weight change.   HENT:  Negative for hearing loss and trouble swallowing.    Eyes:  Negative for photophobia and visual disturbance.   Respiratory:  Negative for cough, shortness of breath and wheezing.    Cardiovascular:  Negative for chest pain, palpitations and leg swelling.   Gastrointestinal:  Negative for abdominal pain and nausea.   Genitourinary:  Negative for dysuria and frequency.   Musculoskeletal:  Negative for arthralgias, back pain, gait problem, joint swelling, myalgias and neck pain.   Skin:  Negative for rash and wound.   Neurological:  Negative for tremors,  seizures, weakness, numbness and headaches.   Hematological:  Does not bruise/bleed easily.         Objective:        Physical Exam:   Foot Exam    General  General Appearance: appears stated age and healthy   Orientation: alert and oriented to person, place, and time   Affect: appropriate   Gait: unimpaired       Left Foot/Ankle      Inspection and Palpation  Ecchymosis: none  Tenderness: (Medial lateral border great toenail)  Swelling: none   Arch: normal  Hammertoes: absent  Claw toes: absent  Hallux valgus: no  Hallux limitus: no  Skin Exam: skin intact;   Neurovascular  Dorsalis pedis: 2+  Posterior tibial: 2+  Capillary refill: 2+  Varicose veins: not present  Saphenous nerve sensation: normal  Tibial nerve sensation: normal  Superficial peroneal nerve sensation: normal  Deep peroneal nerve sensation: normal  Sural nerve sensation: normal    Muscle Strength  Ankle dorsiflexion: 5  Ankle plantar flexion: 5  Ankle inversion: 5  Ankle eversion: 5  Great toe extension: 5  Great toe flexion: 5    Comments  Ingrown ingrown medial and lateral border of the great toenail.  Tender to palpation.  Mild edema is present.  No purulence    Physical Exam  Cardiovascular:      Pulses:           Dorsalis pedis pulses are 2+ on the left side.        Posterior tibial pulses are 2+ on the left side.   Musculoskeletal:      Left foot: No bunion.               Left Ankle/Foot Exam     Comments:  Ingrown ingrown medial and lateral border of the great toenail.  Tender to palpation.  Mild edema is present.  No purulence      Muscle Strength   Left Lower Extremity   Ankle Dorsiflexion:  5   Plantar flexion:  5/5     Vascular Exam       Left Pulses  Dorsalis Pedis:      2+  Posterior Tibial:      2+           Imaging: none            Assessment:       1. Ingrown nail    2. Pain of toe of left foot      Plan:   Ingrown nail  -     Nail Removal    Pain of toe of left foot  -     Nail Removal      Follow up if symptoms worsen or fail to  improve.    We discussed ingrown toenail treatment options of no treatment, avulsion of nail border under local with regrowth of nail, chemical matrixectomy for attempted permanent correction of the problem. Patient was educated about daily dressing changes, soaks, and medications following removal of the nail.       Nail Removal    Date/Time: 8/1/2025 8:20 AM    Performed by: Ryan Blanchard DPM  Authorized by: Ryan Blanchard DPM    Consent Done?:  Yes (Written)  Time out: Immediately prior to the procedure a time out was called    Location:     Location:  Left foot    Location detail:  Left big toe  Anesthesia:     Anesthesia:  Local infiltration    Local anesthetic:  Lidocaine 2% without epinephrine  Procedure Details:     Preparation:  Skin prepped with alcohol    Amount removed:  Partial    Side:  Bilateral    Wedge excision of skin of nail fold: No      Nail bed sutured?: No      Nail matrix removed:  Partial    Removal method:  Phenol and alcohol    Dressing applied:  Dressing applied    Patient tolerance:  Patient tolerated the procedure well with no immediate complications     4 applications of Phenol EZ swabs 89% was applied.               Counseling:     I provided patient education verbally regarding:   Patient diagnosis, treatment options, as well as alternatives, risks, and benefits.     This note was created using Dragon voice recognition software that occasionally misinterpreted phrases or words.                    [1]   Current Outpatient Medications   Medication Sig Dispense Refill    amlodipine-benazepril 10-20mg (LOTREL) 10-20 mg per capsule Take 1 capsule by mouth once daily. 90 capsule 1    cetirizine (ZYRTEC) 10 MG tablet Take 10 mg by mouth once daily.      cyclobenzaprine (FLEXERIL) 10 MG tablet TAKE 1 TABLET BY MOUTH EVERY DAY AT BEDTIME AS NEEDED 90 tablet 0    doxycycline 50 MG capsule TAKE 1 CAPSULE BY MOUTH ONCE DAILY. 90 capsule 3    fluticasone (FLONASE) 50 mcg/actuation nasal spray 1  spray by Each Nostril route as needed.       magnesium 250 mg Tab Take 250 mg by mouth once daily.      metronidazole 1% (METROGEL) 1 % Gel Apply topically once daily. 60 g 11    omega 3-dha-epa-fish oil 1,000 mg (120 mg-180 mg) Cap Take 1 capsule by mouth once daily.      potassium 99 mg Tab Take 2 tablets by mouth once daily.      simvastatin (ZOCOR) 10 MG tablet Take 1 tablet (10 mg total) by mouth every evening. 90 tablet 1    triamcinolone acetonide 0.1% (KENALOG) 0.1 % cream Apply to affected areas of body BID prn rash. Do not use on face, underarms, or groin. 80 g 1    triamterene-hydrochlorothiazide 37.5-25 mg (DYAZIDE) 37.5-25 mg per capsule Take 1 capsule by mouth daily as needed (swelling). 90 capsule 3    zolpidem (AMBIEN) 10 mg Tab Take 1 tablet (10 mg total) by mouth once daily. 90 tablet 0     No current facility-administered medications for this visit.

## 2025-08-18 RX ORDER — DOXYCYCLINE HYCLATE 50 MG/1
50 CAPSULE, GELATIN COATED ORAL
Qty: 90 CAPSULE | Refills: 3 | Status: SHIPPED | OUTPATIENT
Start: 2025-08-18

## (undated) DEVICE — SPONGE GAUZE 16PLY 4X4

## (undated) DEVICE — ELECTRODE REM PLYHSV RETURN 9

## (undated) DEVICE — SEE MEDLINE ITEM 152530

## (undated) DEVICE — TUBING CYSTO DOUBLE 654301

## (undated) DEVICE — SYR 30CC LUER LOCK

## (undated) DEVICE — COVER SURG LIGHT HANDLE

## (undated) DEVICE — SCRUB HIBICLENS 4% CHG 4OZ

## (undated) DEVICE — SOLUTION IRRI NS BOTTLE 1000ML R5200-01

## (undated) DEVICE — BLADE AGRESSIVE PLUS 4.0 375-544-000

## (undated) DEVICE — DRESSING N ADH OIL EMUL 3X3

## (undated) DEVICE — SPONGE GAUZE 10S 4X4  442214

## (undated) DEVICE — SET CYSTO IRRIGATION UNIV SPIK

## (undated) DEVICE — SEE MEDLINE ITEM 152487

## (undated) DEVICE — GLOVE BIOGEL PI   GOLD SIZE 8

## (undated) DEVICE — GLOVE SURG ULTRA TOUCH 8

## (undated) DEVICE — PADDING CAST SPECIALIST 6X4YD

## (undated) DEVICE — TRAY DRY SPONGE SCRUB W FOAM

## (undated) DEVICE — BLADE 90-S SERFAS 3.5 279-351-100

## (undated) DEVICE — SET CYSTO IRRIGATING

## (undated) DEVICE — SYS LABEL CORRECT MED

## (undated) DEVICE — SPONGE LAP 18X18 PREWASHED

## (undated) DEVICE — CONTAINER SPECIMEN STRL 4OZ

## (undated) DEVICE — TUBING CYSTO SINGLE V4608-20

## (undated) DEVICE — ALCOHOL 70% ISOP RUBBING 4OZ

## (undated) DEVICE — ALCOHOL 16OZ

## (undated) DEVICE — DRESSING GAUZE 6PLY 4X4

## (undated) DEVICE — SUT ETHILON 4-0 PS2 18 BLK

## (undated) DEVICE — NDL BOX COUNTER

## (undated) DEVICE — UNDERGLOVES BIOGEL PI SIZE 8

## (undated) DEVICE — SOLUTION NACL 0.9% 3000ML

## (undated) DEVICE — COVER LIGHT HANDLE LB53

## (undated) DEVICE — SEE MEDLINE ITEM 146313

## (undated) DEVICE — GLOVE SURG ULTRA TOUCH 7.5

## (undated) DEVICE — SET IRR URLGY 2LINE UNIV SPIKE

## (undated) DEVICE — PAD BOVIE ADULT

## (undated) DEVICE — SYR 50CC LL

## (undated) DEVICE — PACK ARTHROSCOPY W/ISO BAC

## (undated) DEVICE — DRAPE 3/4

## (undated) DEVICE — PACK ARTHROSCOPY SMHS009-07

## (undated) DEVICE — BRUSH SCRUB HIBICLENS 4%

## (undated) DEVICE — SEE MEDLINE ITEM 157131

## (undated) DEVICE — SEE MEDLINE ITEM 146271

## (undated) DEVICE — CUFF TOURNIQUET DISP 34IN

## (undated) DEVICE — BANDAGE ESMARK 6X12

## (undated) DEVICE — SKINMARKER W/RULER DEVON

## (undated) DEVICE — BLADE SURG CARBON STEEL SZ11

## (undated) DEVICE — BLADE SURG #15 CARBON STEEL

## (undated) DEVICE — SEE MEDLINE ITEM 152622

## (undated) DEVICE — TRAY SKIN PREP DRY

## (undated) DEVICE — GAUZE SPONGE BULKEE 6X6.75IN

## (undated) DEVICE — DRESSING N ADH OIL EMUL 3X8 3S

## (undated) DEVICE — NDL SPINAL 18GX3.5 SPINOCAN

## (undated) DEVICE — CUFF TOURNIQUET 24DUAL PRT 5921-024-235